# Patient Record
Sex: MALE | Race: BLACK OR AFRICAN AMERICAN | Employment: OTHER | URBAN - METROPOLITAN AREA
[De-identification: names, ages, dates, MRNs, and addresses within clinical notes are randomized per-mention and may not be internally consistent; named-entity substitution may affect disease eponyms.]

---

## 2017-01-13 ENCOUNTER — OFFICE VISIT (OUTPATIENT)
Dept: PULMONOLOGY | Age: 52
End: 2017-01-13

## 2017-01-13 VITALS
BODY MASS INDEX: 34.96 KG/M2 | HEART RATE: 65 BPM | HEIGHT: 69 IN | WEIGHT: 236 LBS | OXYGEN SATURATION: 97 % | TEMPERATURE: 98.3 F | SYSTOLIC BLOOD PRESSURE: 132 MMHG | RESPIRATION RATE: 16 BRPM | DIASTOLIC BLOOD PRESSURE: 78 MMHG

## 2017-01-13 DIAGNOSIS — Z99.89 OSA ON CPAP: Primary | ICD-10-CM

## 2017-01-13 DIAGNOSIS — E66.9 OBESITY (BMI 30.0-34.9): ICD-10-CM

## 2017-01-13 DIAGNOSIS — G47.33 OSA ON CPAP: Primary | ICD-10-CM

## 2017-01-13 RX ORDER — BISMUTH SUBSALICYLATE 262 MG
1 TABLET,CHEWABLE ORAL DAILY
COMMUNITY

## 2017-01-13 RX ORDER — ASPIRIN 81 MG/1
81 TABLET ORAL DAILY
COMMUNITY
End: 2020-08-14

## 2017-01-13 NOTE — MR AVS SNAPSHOT
Visit Information Date & Time Provider Department Dept. Phone Encounter #  
 1/13/2017  8:30 AM John Smallwood MD [facility] [id number] Your Appointments 1/30/2017  8:45 AM  
Follow Up with Heena Pavon MD  
[facility] [place] [facility] ([address] [facility]) Appt Note: 6 month follow up  
 [address] [facility] [address]  
  
   
 [address] [address] [address] [address]  
  
    
 2/15/2017  9:15 AM  
ROUTINE CARE with Willis Velasco MD  
[facility] ([address]) Appt Note: 2 month f/u; missed f/u  
 [address] [facility] [address] [facility] [address]  
[phone]  
  
   
 14 [facility] [address] [facility]  
  
    
 5/8/2017 10:30 AM  
Office Visit with Emmanuel Chester MD  
[facility] [address] [facility]) Appt Note: PSA  
 [address] [facility] [address] [address]  
[phone] [address] [address] Upcoming Health Maintenance Date Due DTaP/Tdap/Td series (1 - Tdap) 11/14/2012 INFLUENZA AGE 9 TO ADULT 8/1/2016 COLONOSCOPY 9/30/2021 Allergies as of 1/13/2017  Review Complete On: 1/13/2017 By: John Smallwood MD  
 No Known Allergies Current Immunizations  Reviewed on 6/13/2016 Name Date Td 11/13/2012 Not reviewed this visit You Were Diagnosed With   
  
 Codes Comments DB on CPAP    -  Primary ICD-10-CM: G47.33 
ICD-9-CM: 327.23 Obesity (BMI 30.0-34.9)     ICD-10-CM: M65.6 ICD-9-CM: 278.00 Vitals BP Pulse Temp Resp Height(growth percentile) Weight(growth percentile) 132/78 (BP 1 Location: Left arm, BP Patient Position: Sitting) 65 98.3 °F (36.8 °C) (Oral) 16 5' 9\" (1.753 m) 236 lb (107 kg) SpO2 BMI Smoking Status 97% 34.85 kg/m2 Never Smoker BMI and BSA Data Body Mass Index Body Surface Area  34.85 kg/m 2 2.28 m 2  
 Preferred Pharmacy Pharmacy Name Phone [id number], [facility],4Th Floor [facility] [phone] Your Updated Medication List  
  
   
This list is accurate as of: 1/13/17  9:31 AM.  Always use your most recent med list.  
  
  
  
  
 aspirin delayed-release 81 mg tablet Take  by mouth daily. cpap machine kit  
by Does Not Apply route. CPAP at 11 cm with humidifier. Mask: Wisp nasal, large. Length of need 99 months. Replace mask and accessories as needed times 12 months. Download data at 30 days and fax at [phone]. Dx- DB on CPAP, RLS, Obesity CRESTOR 10 mg tablet Generic drug:  rosuvastatin Take 1 Tab by mouth daily. Indications: HYPERLIPIDEMIA  
  
 esomeprazole 40 mg capsule Commonly known as:  Montine Likes Take 1 Cap by mouth daily. Indications: GASTROESOPHAGEAL REFLUX  
  
 isosorbide mononitrate ER 60 mg CR tablet Commonly known as:  IMDUR  
30 mg daily. LUMIGAN 0.01 % ophthalmic drops Generic drug:  bimatoprost  
Administer 1 Drop to both eyes. multivitamin tablet Commonly known as:  ONE A DAY Take 1 Tab by mouth daily. nitroglycerin 400 mcg/spray spray Commonly known as:  NITROLINGUAL  
1 [place] by SubLINGual route every five (5) minutes as needed for Chest Pain. sertraline 50 mg tablet Commonly known as:  ZOLOFT Take 1 Tab by mouth daily. Introducing [facility] & HEALTH SERVICES! Dear Miguelito Mendoza: Thank you for requesting a [employer] account. Our records indicate that you already have an active [employer] account. You can access your account anytime at [email or web address] Did you know that you can access your hospital and ER discharge instructions at any time in Impraise? You can also review all of your test results from your hospital stay or ER visit. Additional Information If you have questions, please visit the Frequently Asked Questions section of the [employer] website at [email or web address] Remember, [employer] is NOT to be used for urgent needs. For medical emergencies, dial 911. Now available from your iPhone and Android! Please provide this summary of care documentation to your next provider. Your primary care clinician is listed as Cierra Gregory. If you have any questions after today's visit, please call [phone].

## 2017-01-13 NOTE — PROGRESS NOTES
Mr. Lucia Aguilar has a reminder for a \"due or due soon\" health maintenance. I have asked that he contact his primary care provider for follow-up on this health maintenance.

## 2017-01-13 NOTE — PROGRESS NOTES
[facility]   Pulmonary, Critical Care, and Sleep Medicine     Office Progress Note    Primary Care Physician: Miri Dean MD     Reason for Visit:  Follow up for DB on CPAP    Assessment/Plan:  Mr. Foreign Darby is a 46 y.o. male who has  1. DB on CPAP    2. Obesity (BMI 30.0-34. 9)    He has DB and improvedon current new CPAP settings and hence needs CPAP  He is adherent and clinically benefiting from CPAP  His leg movement symptoms have almost resolved on CPAP new settings in past 10 days  · Discussed diagnosis, its evaluation, treatment and usual course. · All questions answered. · Discussed PSG with C PAP titration study results again with the patient  · Continue CPAP at present settings  · CPAP maintenance and humidification discussed and encouraged  · Pathophysiology, severity, risk factors, association to cardiovascular morbidities and excessive daytime sleepiness, consequences of untreated sleep apnea were discussed with the patient  · Adjunctive treatment options including weight reduction measures, positional therapy, surgeries etc were discussed  · Reducing weight with a more healthy, balanced diet and starting an exercise program encouraged   · Emphasized sleep hygiene. · Safe driving and operating machineries practices were advised  · Follow-up in 3 months, sooner should new symptoms or problems arise      Sleep Wake History: Mr. Foreign Darby is a 46 y.o. male patient who presents today for evaluation for DB on CPAP. The history was provided by the patient, spouse. DB on CPAP  This is a chronic problem- had sleep study 2001 with AHI 8.6 and had been using the CPAP for past many yrs, had another study 7 yrs ago and was titrated up on CPAP- on a 7 cm CPAP machine.  He underwent new CPAP titration study 10/16 and now has new CPAP for 1.5 week, with course - better overall, exacerbating factors - weight changes, alleviating factors - CPAP, associated factors - snoring, periods of not breathing and felt tired or unrefreshed after waking up, obesity. He has been using the new CPAP for 1.5 week. Mr. Brenna West is on a 11 cm CPAP machine 7 nights a week for >8 hours per night. He is using a humidifier. He feels that sleep quality, daytime sleepiness are better after sleeping with CPAP and snoring is absent while sleeping with CPAP. He is currently experiencing the following symptoms: none. Tullos's Score 5/24. He denies feel sleepy during the day and denies naps during daytime. He denied falling asleep while driving or motor vehicle accident because of it. Devise: CPAP  Measured Pressure: 11 cm  Mask: Wisp, large  CPAP download performed: Yes - last 10 days, use 100%, avg use 9:05 hrs, AHI 3.9, leaks - 95th percentile 0 Lpm  DME: [facility]    Leg movements  This is a chronic but significantly improved problem. Symptoms began many years ago, severity very mild, intermittent, with course -significantly better in past 10 days, exacerbating factors - DB, inactivity, alleviating factors - CPAP, move legs around, associated factors - as above. Sleep-Wake History: His usual sleeping hours workday and non-workdays are from 10:00 pm to 5:30 am and 11:00 pm to 6:00 am. It usually takes up to < 30 minutes to fall asleep after going to bed. He has less trouble staying asleep, 1 awakening in the middle of the night because of urination need. It takes < 5 minutes to fall asleep again. He had reported toss and turn a lot during sleep, vivid dreams, hypnopompic hallucinations, but denies GERD, bruxism, sleep talking/ walking. Usually ordinarily awaken spontaneously. He denied any narcolepsy symptoms -partial or complete cataplexy, or sleep paralysis or hypnagogic hallucinations. He reports no depression and anxiety.     Past Medical History:  Past Medical History   Diagnosis Date    Back pain     CAD (coronary artery disease)     Chest pain     Coronary artery spasm (HCC)  GERD (gastroesophageal reflux disease)     Headache      migraines    Hypertension     Kidney disease     Kidney stone     Nausea     Sleep apnea     Sleep apnea      uses cpap    Sleep disorder     Vomiting        Past Surgical History:  Past Surgical History   Procedure Laterality Date    Hx heent       sinus sx    Hx endoscopy      Hx urological       Lithotripsy    Hx colonoscopy      Hx back surgery       x3    Hx orthopaedic       left shoulder, right knee, left thumb, left great toe sx    Pr cardiac surg procedure unlist       cardiac catheterization    Pr colsc flx w/rmvl of tumor polyp lesion snare tq N/A 09/30/2016     Dr. Adalberto Syed Colonoscopy N/A 9/30/2016     COLONOSCOPY with biopsy performed by Glynis Homans, MD at [facility] ENDOSCOPY       Family History:  Family History   Problem Relation Age of Onset    Hypertension Mother     Diabetes Mother     Hypertension Father     Cancer Father      Prostate    Heart Disease Father      Heart Attack - 2013    Heart Attack Father     Stroke Brother     Migraines Brother     Cancer Sister      Breast    Migraines Sister     Stroke Brother        Social History:  Social History   Substance Use Topics    Smoking status: Never Smoker    Smokeless tobacco: Never Used    Alcohol use Yes      Comment: rare use        Medications:  Current Outpatient Prescriptions on File Prior to Visit   Medication Sig Dispense Refill    sertraline (ZOLOFT) 50 mg tablet Take 1 Tab by mouth daily. 30 Tab 2    cpap machine kit by Does Not Apply route. CPAP at 11 cm with humidifier. Mask: Wisp nasal, large. Length of need 99 months. Replace mask and accessories as needed times 12 months. Download data at 30 days and fax at [phone]. Dx- DB on CPAP, RLS, Obesity 1 Kit 0    CRESTOR 10 mg tablet Take 1 Tab by mouth daily. Indications: HYPERLIPIDEMIA 90 Tab 3    esomeprazole (NEXIUM) 40 mg capsule Take 1 Cap by mouth daily.  Indications: GASTROESOPHAGEAL REFLUX 90 Cap 3    isosorbide mononitrate ER (IMDUR) 60 mg CR tablet 30 mg daily.  nitroglycerin (NITROLINGUAL) 400 mcg/spray spray 1 Spray by SubLINGual route every five (5) minutes as needed for Chest Pain.  LUMIGAN 0.01 % ophthalmic drops Administer 1 Drop to both eyes. No current facility-administered medications on file prior to visit. Allergy:  No Known Allergies    Review of Systems  General ROS: positive for  - weight gain - 20 lbs, negative for - chills, fever, malaise or night sweats  Respiratory ROS: no cough, shortness of breath, or wheezing  Cardiovascular ROS: negative for - chest pain, edema, murmur, orthopnea or palpitations  Musculoskeletal ROS: positive for - joint pain and joint stiffness  Otherwise per above. Physical Exam:  Blood pressure 132/78, pulse 65, temperature 98.3 °F (36.8 °C), temperature source Oral, resp. rate 16, height 5' 9\" (1.753 m), weight 107 kg (236 lb), SpO2 97 %. on RA, Body mass index is 34.85 kg/(m^2). General: in no respiratory distress and acyanotic, appears stated age, alert, cooperative, no distress, appears stated age, moderately obese   HEENT: PERRLA, EOMI, fundi benign, throat normal without erythema or exudate, throat normal without erythema or exudate, dental indention on tongue, Mallampati's score 3-4+  Neck: No abnormally enlarged lymph nodes or thyroid, supple, neck size 19\"  Chest: normal  Lungs: moderate air entry, clear to auscultation bilaterally, normal percussion bilaterally, no tenderness/ rash  Heart: Regular rate and rhythm, S1S2 present, without murmur or extra heart sounds  Abdomen: obese, bowel sounds normoactive, tympanic, abdomen is soft without significant tenderness, masses, organomegaly or guarding  Extremity: negative for edema, cyanosis, clubbing  Skin: Skin color, texture, turgor normal. No rashes or lesions    Data Reviewed:  CPAP PSG 10/25/16  CONCLUSION:     1.  Successful CPAP titration with elimination of obstructive sleep apnea and associated hypoxemia. CPAP was applied at 5 cm and titrated up to 11 cm. At the setting of 11 cm, sleep efficiency was 97%, AHI was 1.3 with 2 obstructive apnea and 1 hypopnea, REM was seen, REM supine was seen and the lowest oxygen saturation was 91%. Mask leak was tolerable, and arousal and snoring were low. 2. Lowest oxygen saturation 81% with 0.1 minutes of total sleep time below 88% of oxygen saturation.    3. No positional or REM association to respiratory events. REM supine was seen at the CPAP titration pressure setting. 4. No significant Periodic Limb Movements of Sleep (PLMS) arousals. 5. Patient remained in sinus rhythm with PVCs and the heart rate ranging from 52 to 87 bpm.       RECOMMENDATIONS:    1. Overnight CPAP at 11 cm with humidifier.    2. Mask: Wisp nasal, large  3. Positional therapy. Weight reduction measures. 4. Please correlate findings clinically, follow-up symptoms and repeat study as needed.         (outside records)  PSG 12/05/01  AHI 8.6  RDI 22.7    CPAP PSG 11/19/04  CPAP - 8 cm    Labs  CBC:   Lab Results   Component Value Date/Time    WBC 8.4 04/28/2016 05:25 PM    HGB 13.8 04/28/2016 05:25 PM    HCT 40.9 04/28/2016 05:25 PM    PLATELET 572 62/59/5104 05:25 PM    MCV 86.8 04/28/2016 05:25 PM       BMP:   Lab Results   Component Value Date/Time    Sodium 140 06/13/2016 12:35 PM    Potassium 4.2 06/13/2016 12:35 PM    Chloride 107 06/13/2016 12:35 PM    CO2 25 06/13/2016 12:35 PM    Anion gap 8 06/13/2016 12:35 PM    Glucose 83 06/13/2016 12:35 PM    BUN 10 06/13/2016 12:35 PM    Creatinine 0.80 06/13/2016 12:35 PM    BUN/Creatinine ratio 13 06/13/2016 12:35 PM    GFR est AA >60 06/13/2016 12:35 PM    GFR est non-AA >60 06/13/2016 12:35 PM    Calcium 9.0 06/13/2016 12:35 PM        Imaging:  [x]I have personally reviewed the patients radiographs section  9/4/13   XR CHEST PA LAT  No radiographic change of acute cardiopulmonary disease. Armen Henry

## 2017-01-13 NOTE — Clinical Note
Re: Tyra Lopez  1965   Dear Susan Escobedo MD   I recently had the pleasure of seeing Tyra Lopez for follow up. Enclosed is my note from this visit. Please do not hesitate to contact me if you have any questions regarding the care plan.   Thank you very much,  Robb Marquez MD

## 2017-01-16 DIAGNOSIS — K21.9 GASTROESOPHAGEAL REFLUX DISEASE WITHOUT ESOPHAGITIS: ICD-10-CM

## 2017-01-16 RX ORDER — ESOMEPRAZOLE MAGNESIUM 40 MG/1
40 CAPSULE, DELAYED RELEASE ORAL DAILY
Qty: 90 CAP | Refills: 3 | Status: CANCELLED | OUTPATIENT
Start: 2017-01-16

## 2017-01-16 NOTE — TELEPHONE ENCOUNTER
Patient's last office visit on 09-  Medication(s) last filled on 08-15-16  Next Appointment: 02-15-17  Rx Class Normal

## 2017-01-16 NOTE — TELEPHONE ENCOUNTER
From: Rohan Escobar  To: Hector Pierce MD  Sent: 1/16/2017 10:32 AM EST  Subject: Medication Renewal Request    Original authorizing provider:  MD Rohan Pinto would like a refill of the following medications:  esomeprazole (NEXIUM) 40 mg capsule Hector Pierce MD]    Preferred pharmacy: [facility] #[id number] [address]    Comment:

## 2017-01-30 ENCOUNTER — OFFICE VISIT (OUTPATIENT)
Dept: CARDIOLOGY CLINIC | Age: 52
End: 2017-01-30

## 2017-01-30 VITALS
HEIGHT: 69 IN | WEIGHT: 237 LBS | HEART RATE: 67 BPM | SYSTOLIC BLOOD PRESSURE: 139 MMHG | BODY MASS INDEX: 35.1 KG/M2 | DIASTOLIC BLOOD PRESSURE: 85 MMHG

## 2017-01-30 DIAGNOSIS — E78.5 HYPERLIPIDEMIA, UNSPECIFIED HYPERLIPIDEMIA TYPE: ICD-10-CM

## 2017-01-30 DIAGNOSIS — I25.111 CORONARY ARTERY DISEASE INVOLVING NATIVE CORONARY ARTERY OF NATIVE HEART WITH ANGINA PECTORIS WITH DOCUMENTED SPASM (HCC): Primary | ICD-10-CM

## 2017-01-30 DIAGNOSIS — I10 ESSENTIAL HYPERTENSION WITH GOAL BLOOD PRESSURE LESS THAN 140/90: ICD-10-CM

## 2017-01-30 DIAGNOSIS — Z99.89 OSA ON CPAP: ICD-10-CM

## 2017-01-30 DIAGNOSIS — G47.33 OSA ON CPAP: ICD-10-CM

## 2017-01-30 RX ORDER — AMLODIPINE BESYLATE 2.5 MG/1
2.5 TABLET ORAL DAILY
Qty: 30 TAB | Refills: 6 | Status: SHIPPED | OUTPATIENT
Start: 2017-01-30 | End: 2017-04-10 | Stop reason: SDUPTHER

## 2017-01-30 RX ORDER — NITROGLYCERIN 400 UG/1
1 SPRAY ORAL
Qty: 1 BOTTLE | Refills: 1 | Status: SHIPPED | OUTPATIENT
Start: 2017-01-30 | End: 2017-04-10 | Stop reason: SDUPTHER

## 2017-01-30 NOTE — MR AVS SNAPSHOT
Visit Information Date & Time Provider Department Dept. Phone Encounter #  
 1/30/2017  8:45 AM Evelyn Reich MD [facility] [id number] Follow-up Instructions Return in about 6 months (around 7/30/2017). Your Appointments 2/15/2017  9:15 AM  
ROUTINE CARE with Magalys Cruz MD  
[facility] ([facility]) Appt Note: 2 month f/u; missed f/u  
 14 [facility] [address] [facility] [address]  
[phone]  
  
   
 [address]  
  
    
 5/8/2017 10:30 AM  
Office Visit with Bebe Garcia MD  
 [address]) Appt Note: [address] [address]  
[phone] [address] [address]  
  
    
 7/7/2017  9:00 AM  
Follow Up with Evelyn Reich MD  
[facility] ([facility]) Appt Note: 6 month follow up  
 [address] [facility] [address]  
  
   
 [address] [address] [address] [address] Upcoming Health Maintenance Date Due DTaP/Tdap/Td series (1 - Tdap) 11/14/2012 INFLUENZA AGE 9 TO ADULT 8/1/2016 COLONOSCOPY 9/30/2021 Allergies as of 1/30/2017  Review Complete On: 1/30/2017 By: Evelyn Reich MD  
 No Known Allergies Current Immunizations  Reviewed on 6/13/2016 Name Date Td 11/13/2012 Not reviewed this visit You Were Diagnosed With   
  
 Codes Comments Coronary artery disease involving native coronary artery of native heart with angina pectoris with documented spasm ([facility] [address]    -  Primary ICD-10-CM: I25.111 ICD-9-CM: 414.01, 413.9 occasional angina 
prn ntg 
bp stable 
add low dose norvasc Essential hypertension with goal blood pressure less than 140/90     ICD-10-CM: I10 
ICD-9-CM: 401.9 controlled DB on CPAP     ICD-10-CM: G47.33 
ICD-9-CM: 327.23 stable Hyperlipidemia, unspecified hyperlipidemia type     ICD-10-CM: E78.5 ICD-9-CM: 272.4 ldl 73 
6/2016 Vitals BP Pulse Height(growth percentile) Weight(growth percentile) BMI Smoking Status 139/85 67 5' 9\" (1.753 m) 237 lb (107.5 kg) 35 kg/m2 Never Smoker BMI and BSA Data Body Mass Index Body Surface Area 35 kg/m 2 2.29 m 2 Preferred Pharmacy Pharmacy Name Phone [facility] #[id number] Jeanette Mclaughlin, [facility]0 [facility],4Th Floor [facility] [phone] Your Updated Medication List  
  
   
This list is accurate as of: 1/30/17  9:18 AM.  Always use your most recent med list. amLODIPine 2.5 mg tablet Commonly known as:  Juli Colon Take 1 Tab by mouth daily. aspirin delayed-release 81 mg tablet Take  by mouth daily. cpap machine kit  
by Does Not Apply route. CPAP at 11 cm with humidifier. Mask: Wisp nasal, large. Length of need 99 months. Replace mask and accessories as needed times 12 months. Download data at 30 days and fax at [phone]. Dx- DB on CPAP, RLS, Obesity CRESTOR 10 mg tablet Generic drug:  rosuvastatin Take 1 Tab by mouth daily. Indications: HYPERLIPIDEMIA  
  
 esomeprazole 40 mg capsule Commonly known as:  Rexie Chon Take 1 Cap by mouth daily. Indications: GASTROESOPHAGEAL REFLUX  
  
 isosorbide mononitrate ER 60 mg CR tablet Commonly known as:  IMDUR  
30 mg daily. LUMIGAN 0.01 % ophthalmic drops Generic drug:  bimatoprost  
Administer 1 Drop to both eyes. multivitamin tablet Commonly known as:  ONE A DAY Take 1 Tab by mouth daily. nitroglycerin 400 mcg/spray spray Commonly known as:  NITROLINGUAL  
1 [employer] by SubLINGual route every five (5) minutes as needed for Chest Pain. sertraline 50 mg tablet Commonly known as:  ZOLOFT Take 1 Tab by mouth daily. Prescriptions Sent to Pharmacy  Refills  
 nitroglycerin (NITROLINGUAL) 400 mcg/spray spray 1  
 Si Spray by SubLINGual route every five (5) minutes as needed for Chest Pain. Class: Normal  
 Pharmacy: [facility]/pharmacy [address] [place] [address], [address] [facility],4[address] [facility] [address] Ph #: [phone] Route: SubLINGual  
 amLODIPine (NORVASC) 2.5 mg tablet 6 Sig: Take 1 Tab by mouth daily. Class: Normal  
 Pharmacy: [facility]/pharmacy [address] [place] [address], [facility],4Th Floor [facility] [address] Ph #: [phone] Route: Oral  
  
Follow-up Instructions Return in about 6 months (around 2017). Introducing [facility] & [facility] SERVICES! Dear Gely Gonzalez: Thank you for requesting a [employer] account. Our records indicate that you already have an active [employer] account. You can access your account anytime at [email or web address] Did you know that you can access your hospital and ER discharge instructions at any time in [employer]? You can also review all of your test results from your hospital stay or ER visit. Additional Information If you have questions, please visit the Frequently Asked Questions section of the [employer] website at [email or web address] Remember, [employer] is NOT to be used for urgent needs. For medical emergencies, dial 911. Now available from your iPhone and Android! Please provide this summary of care documentation to your next provider. Your primary care clinician is listed as Avtar Swan. If you have any questions after today's visit, please call [phone].

## 2017-01-30 NOTE — PROGRESS NOTES
1. Have you been to the ER, urgent care clinic since your last visit? Hospitalized since your last visit? No    2. Have you seen or consulted any other health care providers outside of the [facility] since your last visit? Include any pap smears or colon screening. Yes Where: pcp     3. Since your last visit, have you had any of the following symptoms? chest pains. 4.  Have you had any blood work, X-rays or cardiac testing?       No

## 2017-01-30 NOTE — PROGRESS NOTES
HISTORY OF PRESENT ILLNESS  Rita Jimenez is a 46 y.o. male. HPI Comments: Patient with cad,coronary spasm,htn  On follow up patient denies any sob, palpitation or other significant symptoms. Hypertension   The history is provided by the patient. This is a chronic problem. The problem occurs constantly. The problem has not changed since onset. Associated symptoms include chest pain. Chest Pain (Angina)    The history is provided by the patient. This is a recurrent problem. The problem has not changed since onset. The problem occurs every several days. The pain is associated with exertion and rest. The pain is present in the substernal region. The pain is mild. The quality of the pain is described as pressure-like. The pain does not radiate. Pertinent negatives include no claudication, no cough, no dizziness, no fever, no hemoptysis, no nausea, no orthopnea, no palpitations, no PND, no sputum production, no vomiting and no weakness. He has tried nitroglycerin for the symptoms. Review of Systems   Constitutional: Negative for chills and fever. HENT: Negative for nosebleeds. Eyes: Negative for blurred vision and double vision. Respiratory: Negative for cough, hemoptysis, sputum production and wheezing. Cardiovascular: Positive for chest pain. Negative for palpitations, orthopnea, claudication, leg swelling and PND. Gastrointestinal: Negative for heartburn, nausea and vomiting. Musculoskeletal: Negative for myalgias. Skin: Negative for rash. Neurological: Negative for dizziness and weakness. Endo/Heme/Allergies: Does not bruise/bleed easily.      Family History   Problem Relation Age of Onset    Hypertension Mother     Diabetes Mother     Hypertension Father     Cancer Father      Prostate    Heart Disease Father      Heart Attack - 2013    Heart Attack Father     Stroke Brother     Migraines Brother     Cancer Sister      Breast    Migraines Sister     Stroke Brother Past Medical History   Diagnosis Date    Back pain     CAD (coronary artery disease)     Chest pain     Coronary artery spasm (HCC)     GERD (gastroesophageal reflux disease)     Headache      migraines    Hypertension     Kidney disease     Kidney stone     Nausea     Sleep apnea     Sleep apnea      uses cpap    Sleep disorder     Vomiting        Past Surgical History   Procedure Laterality Date    Hx heent       sinus sx    Hx endoscopy      Hx urological       Lithotripsy    Hx colonoscopy      Hx back surgery       x3    Hx orthopaedic       left shoulder, right knee, left thumb, left great toe sx    Pr cardiac surg procedure unlist       cardiac catheterization    Pr colsc flx w/rmvl of tumor polyp lesion snare tq N/A 09/30/2016     Dr. Ayan Cabezas Colonoscopy N/A 9/30/2016     COLONOSCOPY with biopsy performed by Giovanna Arechiga MD at [facility] ENDOSCOPY       Social History   Substance Use Topics    Smoking status: Never Smoker    Smokeless tobacco: Never Used    Alcohol use Yes      Comment: rare use       No Known Allergies    Outpatient Prescriptions Marked as Taking for the 1/30/17 encounter (Office Visit) with Svetlana Contreras MD   Medication Sig Dispense Refill    nitroglycerin (NITROLINGUAL) 400 mcg/spray spray 1 Spray by SubLINGual route every five (5) minutes as needed for Chest Pain. 1 Bottle 1    amLODIPine (NORVASC) 2.5 mg tablet Take 1 Tab by mouth daily. 30 Tab 6    aspirin delayed-release 81 mg tablet Take  by mouth daily.  multivitamin (ONE A DAY) tablet Take 1 Tab by mouth daily.  sertraline (ZOLOFT) 50 mg tablet Take 1 Tab by mouth daily. 30 Tab 2    cpap machine kit by Does Not Apply route. CPAP at 11 cm with humidifier. Mask: Wisp nasal, large. Length of need 99 months. Replace mask and accessories as needed times 12 months. Download data at 30 days and fax at [phone].  Dx- DB on CPAP, RLS, Obesity 1 Kit 0    CRESTOR 10 mg tablet Take 1 Tab by mouth daily. Indications: HYPERLIPIDEMIA 90 Tab 3    esomeprazole (NEXIUM) 40 mg capsule Take 1 Cap by mouth daily. Indications: GASTROESOPHAGEAL REFLUX 90 Cap 3    isosorbide mononitrate ER (IMDUR) 60 mg CR tablet 30 mg daily.  LUMIGAN 0.01 % ophthalmic drops Administer 1 Drop to both eyes. Visit Vitals    /85    Pulse 67    Ht 5' 9\" (1.753 m)    Wt 107.5 kg (237 lb)    BMI 35 kg/m2         Physical Exam   Constitutional: He is oriented to person, place, and time. He appears well-developed and well-nourished. HENT:   Head: Normocephalic and atraumatic. Eyes: Conjunctivae are normal.   Neck: Neck supple. No JVD present. No tracheal deviation present. No thyromegaly present. Cardiovascular: Normal rate, regular rhythm and normal heart sounds. Exam reveals no gallop and no friction rub. No murmur heard. Pulmonary/Chest: Breath sounds normal. No respiratory distress. He has no wheezes. He has no rales. He exhibits no tenderness. Abdominal: Soft. There is no tenderness. Musculoskeletal: He exhibits no edema. Neurological: He is alert and oriented to person, place, and time. Skin: Skin is warm and dry. Psychiatric: He has a normal mood and affect. Mr. Camila Pinedo has a reminder for a \"due or due soon\" health maintenance. I have asked that he contact his primary care provider for follow-up on this health maintenance. Cath-2004  rca spasm-  lcx 30-40%  Stress test-2015  Fixed ant defect  Echo-2015  Normal lvef  ekg-7/2016-  wnl    Assessment         ICD-10-CM ICD-9-CM    1. Coronary artery disease involving native coronary artery of native heart with angina pectoris with documented spasm ([facility]) I25.111 414.01      413.9     occasional angina  prn ntg  bp stable  add low dose norvasc   2. Essential hypertension with goal blood pressure less than 140/90 I10 401.9     controlled   3. DB on CPAP G47.33 327.23     stable   4.  Hyperlipidemia, unspecified hyperlipidemia type E78.5 272.4     ldl 73  2016   Had tried cardizem in past -had drop in bp  No acei/normal lvef    Medications Discontinued During This Encounter   Medication Reason    nitroglycerin (NITROLINGUAL) 400 mcg/spray spray Reorder       Orders Placed This Encounter    nitroglycerin (NITROLINGUAL) 400 mcg/spray spray     Si Spray by SubLINGual route every five (5) minutes as needed for Chest Pain. Dispense:  1 Bottle     Refill:  1    amLODIPine (NORVASC) 2.5 mg tablet     Sig: Take 1 Tab by mouth daily. Dispense:  30 Tab     Refill:  6       Follow-up Disposition:  Return in about 6 months (around 2017).

## 2017-01-30 NOTE — LETTER
Antwon Cain 1965 1/30/2017 Dear MD Jaswant Galindo MD 
 
I had the pleasure of evaluating  Mr. Gissel Lezama in office today. Below are the relevant portions of my assessment and plan of care. ICD-10-CM ICD-9-CM 1. Coronary artery disease involving native coronary artery of native heart with angina pectoris with documented spasm ([facility]) I25.111 414.01   
  413.9   
 occasional angina 
prn ntg 
bp stable 
add low dose norvasc 2. Essential hypertension with goal blood pressure less than 140/90 I10 401.9   
 controlled 3. DB on CPAP G47.33 327.23   
 stable 4. Hyperlipidemia, unspecified hyperlipidemia type E78.5 272.4   
 ldl 73 
6/2016 Current Outpatient Prescriptions Medication Sig Dispense Refill  nitroglycerin (NITROLINGUAL) 400 mcg/spray spray 1 Spray by SubLINGual route every five (5) minutes as needed for Chest Pain. 1 Bottle 1  
 amLODIPine (NORVASC) 2.5 mg tablet Take 1 Tab by mouth daily. 30 Tab 6  
 aspirin delayed-release 81 mg tablet Take  by mouth daily.  multivitamin (ONE A DAY) tablet Take 1 Tab by mouth daily.  sertraline (ZOLOFT) 50 mg tablet Take 1 Tab by mouth daily. 30 Tab 2  
 cpap machine kit by Does Not Apply route. CPAP at 11 cm with humidifier. Mask: Wisp nasal, large. Length of need 99 months. Replace mask and accessories as needed times 12 months. Download data at 30 days and fax at [phone]. Dx- DB on CPAP, RLS, Obesity 1 Kit 0  
 CRESTOR 10 mg tablet Take 1 Tab by mouth daily. Indications: HYPERLIPIDEMIA 90 Tab 3  
 esomeprazole (NEXIUM) 40 mg capsule Take 1 Cap by mouth daily. Indications: GASTROESOPHAGEAL REFLUX 90 Cap 3  
 isosorbide mononitrate ER (IMDUR) 60 mg CR tablet 30 mg daily.  LUMIGAN 0.01 % ophthalmic drops Administer 1 Drop to both eyes. Orders Placed This Encounter  nitroglycerin (NITROLINGUAL) 400 mcg/spray spray Si Spray by SubLINGual route every five (5) minutes as needed for Chest Pain. Dispense:  1 Bottle Refill:  1  
 amLODIPine (NORVASC) 2.5 mg tablet Sig: Take 1 Tab by mouth daily. Dispense:  30 Tab Refill:  6 If you have questions, please do not hesitate to call me. I look forward to following Mr. Ingris Sutton along with you. Sincerely, Norris Terry MD

## 2017-03-07 ENCOUNTER — HOSPITAL ENCOUNTER (OUTPATIENT)
Dept: LAB | Age: 52
Discharge: HOME | End: 2017-03-07

## 2017-03-07 ENCOUNTER — OFFICE VISIT (OUTPATIENT)
Dept: FAMILY MEDICINE CLINIC | Facility: CLINIC | Age: 52
End: 2017-03-07

## 2017-03-07 VITALS
SYSTOLIC BLOOD PRESSURE: 128 MMHG | RESPIRATION RATE: 12 BRPM | OXYGEN SATURATION: 96 % | TEMPERATURE: 98 F | DIASTOLIC BLOOD PRESSURE: 82 MMHG | HEART RATE: 73 BPM | WEIGHT: 238 LBS | BODY MASS INDEX: 35.25 KG/M2 | HEIGHT: 69 IN

## 2017-03-07 DIAGNOSIS — F32.9 MAJOR DEPRESSIVE DISORDER WITH SINGLE EPISODE, REMISSION STATUS UNSPECIFIED: ICD-10-CM

## 2017-03-07 DIAGNOSIS — R73.03 PREDIABETES: ICD-10-CM

## 2017-03-07 DIAGNOSIS — R35.89 POLYURIA: ICD-10-CM

## 2017-03-07 DIAGNOSIS — I25.111 CORONARY ARTERY DISEASE INVOLVING NATIVE CORONARY ARTERY OF NATIVE HEART WITH ANGINA PECTORIS WITH DOCUMENTED SPASM (HCC): ICD-10-CM

## 2017-03-07 DIAGNOSIS — R51.9 ACUTE NONINTRACTABLE HEADACHE, UNSPECIFIED HEADACHE TYPE: ICD-10-CM

## 2017-03-07 DIAGNOSIS — K21.9 GASTROESOPHAGEAL REFLUX DISEASE WITHOUT ESOPHAGITIS: ICD-10-CM

## 2017-03-07 DIAGNOSIS — I10 ESSENTIAL HYPERTENSION WITH GOAL BLOOD PRESSURE LESS THAN 140/90: Primary | ICD-10-CM

## 2017-03-07 PROCEDURE — 99001 SPECIMEN HANDLING PT-LAB: CPT | Performed by: FAMILY MEDICINE

## 2017-03-07 NOTE — PATIENT INSTRUCTIONS

## 2017-03-07 NOTE — PROGRESS NOTES
HISTORY OF PRESENT ILLNESS  Tyra Lopez is a 46 y.o. male. HPI Comments: Seen in follow-up for HTN, coronary artery spasm, autonomic neuropathy, GERD, sleep apnea (on CPAP), depression/anxiety, kidney stones (followed by Urology here), glaucoma. He is concerned about frequent urination, not associated with dysuria/polydipsia/polyphagia/weight loss, and wonders if he now has diabetes. He has nocturia as well. He also complains of dull, generalized intermittent headache over the past few days - not associated with N/V, NC, lacrimation. These last up to 3 hours, are 3/10 in intensity, and resolve without medication. He has had what sound like migraines in the past, with negative work-ups, but these are not similar to those headaches or to his previous sinus headaches. Physical   Associated symptoms include headaches. Pertinent negatives include no chest pain and no shortness of breath. Headache   Associated symptoms include headaches. Pertinent negatives include no chest pain and no shortness of breath.        Past Medical History:   Diagnosis Date    Back pain     CAD (coronary artery disease)     Chest pain     Coronary artery spasm (HCC)     GERD (gastroesophageal reflux disease)     Headache     migraines    Hypertension     Kidney disease     Kidney stone     Nausea     Sleep apnea     Sleep apnea     uses cpap    Sleep disorder     Vomiting        Past Surgical History:   Procedure Laterality Date    CARDIAC SURG PROCEDURE UNLIST      cardiac catheterization    COLONOSCOPY N/A 9/30/2016    COLONOSCOPY with biopsy performed by Yamilka Laguna MD at [facility] ENDOSCOPY    HX BACK SURGERY      x3    HX COLONOSCOPY      HX ENDOSCOPY      HX HEENT      sinus sx    HX ORTHOPAEDIC      left shoulder, right knee, left thumb, left great toe sx    HX UROLOGICAL      Lithotripsy    WI COLSC FLX W/RMVL OF TUMOR POLYP LESION SNARE TQ N/A 09/30/2016    Dr. Jyotsna Quezada       History   Smoking Status  Never Smoker   Smokeless Tobacco    Never Used     Current Outpatient Prescriptions   Medication Sig    nitroglycerin (NITROLINGUAL) 400 mcg/spray spray 1 Spray by SubLINGual route every five (5) minutes as needed for Chest Pain.  amLODIPine (NORVASC) 2.5 mg tablet Take 1 Tab by mouth daily.  aspirin delayed-release 81 mg tablet Take  by mouth daily.  multivitamin (ONE A DAY) tablet Take 1 Tab by mouth daily.  sertraline (ZOLOFT) 50 mg tablet Take 1 Tab by mouth daily.  cpap machine kit by Does Not Apply route. CPAP at 11 cm with humidifier. Mask: Wisp nasal, large. Length of need 99 months. Replace mask and accessories as needed times 12 months. Download data at 30 days and fax at [phone]. Dx- DB on CPAP, RLS, Obesity    CRESTOR 10 mg tablet Take 1 Tab by mouth daily. Indications: HYPERLIPIDEMIA    esomeprazole (NEXIUM) 40 mg capsule Take 1 Cap by mouth daily. Indications: GASTROESOPHAGEAL REFLUX    isosorbide mononitrate ER (IMDUR) 60 mg CR tablet 30 mg daily.  LUMIGAN 0.01 % ophthalmic drops Administer 1 Drop to both eyes. No current facility-administered medications for this visit. Review of Systems   Constitutional: Negative for chills and fever. HENT: Negative for congestion, ear pain, sore throat and tinnitus. Eyes: Negative for blurred vision and double vision. Respiratory: Negative for shortness of breath. Cardiovascular: Negative for chest pain, palpitations and leg swelling. Gastrointestinal: Negative for nausea and vomiting. Genitourinary: Positive for frequency and urgency. Negative for dysuria. Neurological: Positive for dizziness (single episode last week) and headaches. Negative for tingling and focal weakness.        Visit Vitals    /82 (BP 1 Location: Right arm, BP Patient Position: Sitting)    Pulse 73    Temp 98 °F (36.7 °C) (Oral)    Resp 12    Ht 5' 9\" (1.753 m)    Wt 238 lb (108 kg)    SpO2 96%    BMI 35.15 kg/m2 Physical Exam   Constitutional: He is oriented to person, place, and time. He appears well-developed and well-nourished. No distress. HENT:   Right Ear: Tympanic membrane, external ear and ear canal normal.   Left Ear: Tympanic membrane, external ear and ear canal normal.   Nose: Nose normal.   Mouth/Throat: Oropharynx is clear and moist.   Eyes: Conjunctivae and EOM are normal. Pupils are equal, round, and reactive to light. Neck: Neck supple. Carotid bruit is not present. No thyromegaly present. Cardiovascular: Normal rate and regular rhythm. Exam reveals no gallop and no friction rub. No murmur heard. Pulmonary/Chest: Effort normal and breath sounds normal. No respiratory distress. Musculoskeletal: He exhibits no edema. Lymphadenopathy:     He has no cervical adenopathy. Neurological: He is alert and oriented to person, place, and time. Skin: Skin is warm and dry. Psychiatric: He has a normal mood and affect. His behavior is normal. Judgment and thought content normal.       ASSESSMENT and PLAN    ICD-10-CM ICD-9-CM    1. Essential hypertension with goal blood pressure less than 140/90 I10 401.9    2. Prediabetes R73.03 790.29    3. Polyuria P06.0 468.21 METABOLIC PANEL, COMPREHENSIVE      HEMOGLOBIN A1C WITH EAG      URINALYSIS W/ RFLX MICROSCOPIC   4. Coronary artery disease involving native coronary artery of native heart with angina pectoris with documented spasm ([facility] [address] I25.111 414.01      413.9    5. Gastroesophageal reflux disease without esophagitis K21.9 530.81    6. Major depressive disorder with single episode, remission status unspecified F32.9 296.20    7. Acute nonintractable headache, unspecified headache type R51 784.0      Follow-up Disposition:  Return in about 6 months (around 9/7/2017).   current treatment plan is effective, no change in therapy  lab results and schedule of future lab studies reviewed with patient - frequency  could reflect UTI, or diabetes  reviewed diet, exercise and weight control  reviewed medications and side effects in detail  His headaches do not seem to be related to medications, or sinus infection, or migraine, or BP - can take Tylenol prn for them  Plan of care reviewed - patient verbalize(s) understanding and agreement.

## 2017-03-08 LAB
ALBUMIN SERPL-MCNC: 4.4 G/DL (ref 3.5–5.5)
ALBUMIN/GLOB SERPL: 1.8 {RATIO} (ref 1.1–2.5)
ALP SERPL-CCNC: 75 IU/L (ref 39–117)
ALT SERPL-CCNC: 31 IU/L (ref 0–44)
AST SERPL-CCNC: 28 IU/L (ref 0–40)
BILIRUB SERPL-MCNC: <0.2 MG/DL (ref 0–1.2)
BUN SERPL-MCNC: 11 MG/DL (ref 6–24)
BUN/CREAT SERPL: 13 (ref 9–20)
CALCIUM SERPL-MCNC: 9.3 MG/DL (ref 8.7–10.2)
CHLORIDE SERPL-SCNC: 105 MMOL/L (ref 96–106)
CO2 SERPL-SCNC: 20 MMOL/L (ref 18–29)
CREAT SERPL-MCNC: 0.82 MG/DL (ref 0.76–1.27)
EST. AVERAGE GLUCOSE BLD GHB EST-MCNC: 126 MG/DL
GLOBULIN SER CALC-MCNC: 2.5 G/DL (ref 1.5–4.5)
GLUCOSE SERPL-MCNC: 89 MG/DL (ref 65–99)
HBA1C MFR BLD: 6 % (ref 4.8–5.6)
POTASSIUM SERPL-SCNC: 4.4 MMOL/L (ref 3.5–5.2)
PROT SERPL-MCNC: 6.9 G/DL (ref 6–8.5)
SODIUM SERPL-SCNC: 143 MMOL/L (ref 134–144)

## 2017-03-13 NOTE — PROGRESS NOTES
Patient made aware of normal lab results. Patient stated he gave urine sample in the office the day of his appointment  17 . Verified name and . Patient verbalized an understanding of results and did not voice any concerns at this time.

## 2017-04-10 ENCOUNTER — HOSPITAL ENCOUNTER (EMERGENCY)
Age: 52
Discharge: HOME | End: 2017-04-10
Attending: EMERGENCY MEDICINE | Admitting: EMERGENCY MEDICINE
Payer: COMMERCIAL

## 2017-04-10 VITALS
TEMPERATURE: 100.5 F | SYSTOLIC BLOOD PRESSURE: 133 MMHG | HEIGHT: 69 IN | WEIGHT: 238 LBS | DIASTOLIC BLOOD PRESSURE: 79 MMHG | BODY MASS INDEX: 35.25 KG/M2 | OXYGEN SATURATION: 95 % | HEART RATE: 118 BPM | RESPIRATION RATE: 18 BRPM

## 2017-04-10 DIAGNOSIS — K52.9 GASTROENTERITIS, ACUTE: Primary | ICD-10-CM

## 2017-04-10 DIAGNOSIS — E78.5 HYPERLIPIDEMIA, UNSPECIFIED HYPERLIPIDEMIA TYPE: ICD-10-CM

## 2017-04-10 DIAGNOSIS — F32.4 MAJOR DEPRESSIVE DISORDER WITH SINGLE EPISODE, IN PARTIAL REMISSION (HCC): ICD-10-CM

## 2017-04-10 DIAGNOSIS — K21.9 GASTROESOPHAGEAL REFLUX DISEASE WITHOUT ESOPHAGITIS: ICD-10-CM

## 2017-04-10 LAB
ANION GAP BLD CALC-SCNC: 11 MMOL/L (ref 3–18)
BASOPHILS # BLD AUTO: 0 K/UL (ref 0–0.06)
BASOPHILS # BLD: 0 % (ref 0–2)
BUN SERPL-MCNC: 15 MG/DL (ref 7–18)
BUN/CREAT SERPL: 14 (ref 12–20)
CALCIUM SERPL-MCNC: 9.2 MG/DL (ref 8.5–10.1)
CHLORIDE SERPL-SCNC: 104 MMOL/L (ref 100–108)
CO2 SERPL-SCNC: 28 MMOL/L (ref 21–32)
CREAT SERPL-MCNC: 1.08 MG/DL (ref 0.6–1.3)
DIFFERENTIAL METHOD BLD: ABNORMAL
EOSINOPHIL # BLD: 0 K/UL (ref 0–0.4)
EOSINOPHIL NFR BLD: 0 % (ref 0–5)
ERYTHROCYTE [DISTWIDTH] IN BLOOD BY AUTOMATED COUNT: 14.3 % (ref 11.6–14.5)
GLUCOSE SERPL-MCNC: 120 MG/DL (ref 74–99)
HCT VFR BLD AUTO: 45.8 % (ref 36–48)
HGB BLD-MCNC: 15.5 G/DL (ref 13–16)
LYMPHOCYTES # BLD AUTO: 6 % (ref 21–52)
LYMPHOCYTES # BLD: 0.5 K/UL (ref 0.9–3.6)
MCH RBC QN AUTO: 29.2 PG (ref 24–34)
MCHC RBC AUTO-ENTMCNC: 33.8 G/DL (ref 31–37)
MCV RBC AUTO: 86.4 FL (ref 74–97)
MONOCYTES # BLD: 0.5 K/UL (ref 0.05–1.2)
MONOCYTES NFR BLD AUTO: 6 % (ref 3–10)
NEUTS SEG # BLD: 7.6 K/UL (ref 1.8–8)
NEUTS SEG NFR BLD AUTO: 88 % (ref 40–73)
PLATELET # BLD AUTO: 252 K/UL (ref 135–420)
PMV BLD AUTO: 10.4 FL (ref 9.2–11.8)
POTASSIUM SERPL-SCNC: 4.1 MMOL/L (ref 3.5–5.5)
RBC # BLD AUTO: 5.3 M/UL (ref 4.7–5.5)
SODIUM SERPL-SCNC: 143 MMOL/L (ref 136–145)
WBC # BLD AUTO: 8.6 K/UL (ref 4.6–13.2)

## 2017-04-10 PROCEDURE — 99282 EMERGENCY DEPT VISIT SF MDM: CPT

## 2017-04-10 PROCEDURE — 74011250636 HC RX REV CODE- 250/636: Performed by: EMERGENCY MEDICINE

## 2017-04-10 PROCEDURE — 96361 HYDRATE IV INFUSION ADD-ON: CPT

## 2017-04-10 PROCEDURE — 96374 THER/PROPH/DIAG INJ IV PUSH: CPT

## 2017-04-10 PROCEDURE — 85025 COMPLETE CBC W/AUTO DIFF WBC: CPT | Performed by: EMERGENCY MEDICINE

## 2017-04-10 PROCEDURE — 80048 BASIC METABOLIC PNL TOTAL CA: CPT | Performed by: EMERGENCY MEDICINE

## 2017-04-10 RX ORDER — AMLODIPINE BESYLATE 2.5 MG/1
2.5 TABLET ORAL DAILY
Qty: 90 TAB | Refills: 3 | Status: SHIPPED | COMMUNITY
Start: 2017-04-10 | End: 2018-03-24 | Stop reason: SDUPTHER

## 2017-04-10 RX ORDER — ONDANSETRON 8 MG/1
8 TABLET, ORALLY DISINTEGRATING ORAL
Qty: 10 TAB | Refills: 0 | Status: SHIPPED | OUTPATIENT
Start: 2017-04-10 | End: 2017-04-17

## 2017-04-10 RX ORDER — ROSUVASTATIN CALCIUM 10 MG/1
10 TABLET, FILM COATED ORAL DAILY
Qty: 90 TAB | Refills: 4 | Status: SHIPPED | OUTPATIENT
Start: 2017-04-10 | End: 2017-06-30 | Stop reason: SDUPTHER

## 2017-04-10 RX ORDER — ESOMEPRAZOLE MAGNESIUM 40 MG/1
40 CAPSULE, DELAYED RELEASE ORAL DAILY
Qty: 90 CAP | Refills: 4 | Status: SHIPPED | OUTPATIENT
Start: 2017-04-10 | End: 2018-04-27 | Stop reason: SDUPTHER

## 2017-04-10 RX ORDER — SERTRALINE HYDROCHLORIDE 50 MG/1
50 TABLET, FILM COATED ORAL DAILY
Qty: 90 TAB | Refills: 4 | Status: SHIPPED | OUTPATIENT
Start: 2017-04-10 | End: 2018-04-27 | Stop reason: SDUPTHER

## 2017-04-10 RX ORDER — TRAMADOL HYDROCHLORIDE 50 MG/1
50 TABLET ORAL
Qty: 20 TAB | Refills: 0 | Status: SHIPPED | OUTPATIENT
Start: 2017-04-10 | End: 2017-04-10

## 2017-04-10 RX ORDER — ONDANSETRON 2 MG/ML
8 INJECTION INTRAMUSCULAR; INTRAVENOUS
Status: COMPLETED | OUTPATIENT
Start: 2017-04-10 | End: 2017-04-10

## 2017-04-10 RX ORDER — NITROGLYCERIN 400 UG/1
1 SPRAY ORAL
Qty: 1 BOTTLE | Refills: 1 | Status: SHIPPED | COMMUNITY
Start: 2017-04-10 | End: 2018-07-24 | Stop reason: SDUPTHER

## 2017-04-10 RX ADMIN — ONDANSETRON 8 MG: 2 INJECTION INTRAMUSCULAR; INTRAVENOUS at 08:52

## 2017-04-10 RX ADMIN — SODIUM CHLORIDE 1000 ML: 900 INJECTION, SOLUTION INTRAVENOUS at 08:52

## 2017-04-10 NOTE — ED TRIAGE NOTES
Patient c/o eating at [facility] yesterday and think he has food poisonings, vomited x 4 or 5 times this morning and diarrhea x 6 this morning

## 2017-04-10 NOTE — ED NOTES
I have reviewed discharge instructions with the patient. The patient verbalized understanding. Current Discharge Medication List      START taking these medications    Details   ondansetron (ZOFRAN ODT) 8 mg disintegrating tablet Take 1 Tab by mouth every eight (8) hours as needed for Nausea.   Qty: 10 Tab, Refills: 0         Patient armband removed and shredded

## 2017-04-10 NOTE — PROGRESS NOTES
Received request for mail order pharmacy for Sertraline, Esomeprazole, and Crestor. Will do this for him.

## 2017-04-10 NOTE — DISCHARGE INSTRUCTIONS
Food Poisoning: Care Instructions  Your Care Instructions  Food poisoning occurs when you eat foods that contain harmful germs. Food can be contaminated while it is growing, during processing, or when it is prepared. Fresh fruits and vegetables also can be contaminated if they are washed in contaminated water. You may have become ill after eating undercooked meat or eggs or other unsafe foods. Cooking foods thoroughly and storing them properly can help prevent food poisoning. There are many types of food poisoning. Your symptoms depend on the type of food poisoning you have. You will probably begin to feel better in 1 or 2 days. In the meantime, get plenty of rest and make sure that you do not become dehydrated. Follow-up care is a key part of your treatment and safety. Be sure to make and go to all appointments, and call your doctor if you are having problems. Its also a good idea to know your test results and keep a list of the medicines you take. How can you care for yourself at home? · To prevent dehydration, drink plenty of fluids, enough so that your urine is light yellow or clear like water. Choose water and other caffeine-free clear liquids until you feel better. If you have kidney, heart, or liver disease and have to limit fluids, talk with your doctor before you increase the amount of fluids you drink. · Begin eating small amounts of mild, low-fat foods, depending on how you feel. Try foods like rice, dry crackers, bananas, and applesauce. ¨ Avoid spicy foods, alcohol, and coffee until 48 hours after all symptoms have disappeared. ¨ Avoid chewing gum that contains sorbitol. ¨ Avoid dairy products for 3 days after symptoms disappear. · Take your medicines as prescribed. Call your doctor if you think you are having a problem with your medicine. You will get more details on the specific medicines your doctor prescribes. To prevent food poisoning  · Keep hot foods hot and cold foods cold.   · Do not eat meats, dressings, salads, or other foods that have been kept at room temperature for more than 2 hours. · Use a thermometer to check your refrigerator. It should be between 34°F and 40°F.  · Defrost meats in the refrigerator or microwave, not on the kitchen counter. · Keep your hands and your kitchen clean. Wash your hands, cutting boards, and countertops with hot, soapy water. If you use the same cutting board for chopping vegetables and preparing raw meat, be sure to wash the cutting board with hot, soapy water between each use. · Cook meat until it is well done. · Do not eat raw eggs or uncooked dough or sauces made with raw eggs. · Do not take chances. If you think food looks or tastes spoiled, throw it out. When should you call for help? Call 911 anytime you think you may need emergency care. For example, call if:  · You have sudden, severe belly pain. · You passed out (lost consciousness). · You vomit blood or what looks like coffee grounds. · You pass maroon or very bloody stools. Call your doctor now or seek immediate medical care if:  · You have signs of needing more fluids. You have sunken eyes and a dry mouth, and you pass only a little dark urine. · Weakness in your legs makes it hard to stand or walk. · You have swelling in your hands, face, or feet. · You have trouble breathing. · You are dizzy or lightheaded, or you feel like you may faint. · Your stools are black and tarlike or have streaks of blood. · You have numbness and tingling in your hands or feet. · You have new nausea or vomiting. · You have new or increased belly pain. · Your joints ache. Watch closely for changes in your health, and be sure to contact your doctor if:  · Your vomiting is not getting better after 1 to 2 days. · Your diarrhea is not getting better after 3 days. Where can you learn more? Go to http://sandra-ramana.info/.   Enter T116 in the search box to learn more about \"Food Poisoning: Care Instructions. \"  Current as of: May 24, 2016  Content Version: 11.2  © 9888-8434 [employer]. Care instructions adapted under license by [employer] (which disclaims liability or warranty for this information). If you have questions about a medical condition or this instruction, always ask your healthcare professional. [place] [address] any warranty or liability for your use of this information.

## 2017-04-10 NOTE — ED PROVIDER NOTES
HPI Comments: 8:00 AM Homero Arce is a 46 y.o. male with a history of CAD, HTN, Kidney Disease and GERD who presents to the emergency department c/o N/V/D onset 10:00 PM last night with associated diffuse abdominal pain. The patient explains that he ate at [facility] yesterday afternoon and feels that this may be the source of his symptoms secondary to feeling well and normal earlier in the day. Pt also c/o headache. No other complaints or concerns at this time. PCP: Veena Arzola MD      The history is provided by the patient.         Past Medical History:   Diagnosis Date    Back pain     CAD (coronary artery disease)     Chest pain     Coronary artery spasm (HCC)     GERD (gastroesophageal reflux disease)     Headache     migraines    Hypertension     Kidney disease     Kidney stone     Nausea     Sleep apnea     Sleep apnea     uses cpap    Sleep disorder     Vomiting        Past Surgical History:   Procedure Laterality Date    CARDIAC SURG PROCEDURE UNLIST      cardiac catheterization    COLONOSCOPY N/A 9/30/2016    COLONOSCOPY with biopsy performed by Michael Mayo MD at [facility] ENDOSCOPY    HX BACK SURGERY      x3    HX COLONOSCOPY      HX ENDOSCOPY      HX HEENT      sinus sx    HX ORTHOPAEDIC      left shoulder, right knee, left thumb, left great toe sx    HX UROLOGICAL      Lithotripsy    RI COLSC FLX W/RMVL OF TUMOR POLYP LESION SNARE TQ N/A 09/30/2016    Dr. Flaivo Suh         Family History:   Problem Relation Age of Onset    Hypertension Mother     Diabetes Mother     Hypertension Father     Cancer Father      Prostate    Heart Disease Father      Heart Attack - 2013    Heart Attack Father     Stroke Brother     Migraines Brother     Cancer Sister      Breast    Migraines Sister     Stroke Brother        Social History     Social History    Marital status:      Spouse name: N/A    Number of children: N/A    Years of education: N/A     Occupational History    Not on file. Social History Main Topics    Smoking status: Never Smoker    Smokeless tobacco: Never Used    Alcohol use Yes      Comment: rare use    Drug use: No    Sexual activity: Yes     Partners: Female     Other Topics Concern    Not on file     Social History Narrative    ** Merged History Encounter **              ALLERGIES: Review of patient's allergies indicates no known allergies. Review of Systems   Constitutional: Negative for chills. HENT: Negative for congestion and rhinorrhea. Respiratory: Negative for cough and shortness of breath. Cardiovascular: Negative for chest pain and leg swelling. Gastrointestinal: Positive for abdominal pain (diffuse), diarrhea, nausea and vomiting. Genitourinary: Negative for dysuria and hematuria. Musculoskeletal: Negative for arthralgias and myalgias. Skin: Negative for rash and wound. Neurological: Positive for headaches. Negative for light-headedness. Psychiatric/Behavioral: Negative for confusion and hallucinations. All other systems reviewed and are negative. Vitals:    04/10/17 0756   BP: 133/79   Pulse: (!) 118   Resp: 18   Temp: (!) 100.5 °F (38.1 °C)   SpO2: 95%   Weight: 108 kg (238 lb)   Height: 5' 9\" (1.753 m)            Physical Exam   Constitutional: He is oriented to person, place, and time. He appears well-developed and well-nourished. HENT:   Head: Normocephalic and atraumatic. Eyes: Pupils are equal, round, and reactive to light. Neck: Neck supple. Cardiovascular: Normal rate. No murmur heard. Pulmonary/Chest: Effort normal. He has no wheezes. Abdominal: Soft. There is no tenderness. Musculoskeletal: He exhibits no tenderness. Neurological: He is alert and oriented to person, place, and time. Skin: No pallor. Nursing note and vitals reviewed.        MDM  Number of Diagnoses or Management Options  Gastroenteritis, acute:   Diagnosis management comments: Feels better after meds and IVF, wants to go home, agrees with dispo and F/U plan. Smiley Bean MD  9:37 AM         Amount and/or Complexity of Data Reviewed  Clinical lab tests: ordered and reviewed      ED Course       Procedures  Vitals:  Patient Vitals for the past 12 hrs:   Temp Pulse Resp BP SpO2   04/10/17 0756 (!) 100.5 °F (38.1 °C) (!) 118 18 133/79 95 %   95 %. Percentage is within normal limits. Medications ordered:   Medications - No data to display      Lab findings:  No results found for this or any previous visit (from the past 12 hour(s)). EKG interpretation by ED Physician:        X-Ray, CT or other radiology findings or impressions:  No orders to display         Progress notes, Consult notes or additional Procedure notes:        Disposition:  Diagnosis: No diagnosis found. Disposition:       Scribe Attestation:     Toby Azevedo for and in the presence of Smiley Bean MD April 10, 2017 at 8:11 AM     Physician Attestation:   I personally performed the services described in this documentation, reviewed and edited the documentation which was dictated to the scribe in my presence, and it accurately records my words and actions.  Smiley Bean MD  April 10, 2017 at 8:11 AM    Signed by: Felipe Spencer April 10, 2017, 8:11 AM

## 2017-04-17 ENCOUNTER — HOSPITAL ENCOUNTER (OUTPATIENT)
Dept: LAB | Age: 52
Discharge: HOME | End: 2017-04-17

## 2017-04-17 ENCOUNTER — OFFICE VISIT (OUTPATIENT)
Dept: FAMILY MEDICINE CLINIC | Facility: CLINIC | Age: 52
End: 2017-04-17

## 2017-04-17 VITALS
HEART RATE: 68 BPM | BODY MASS INDEX: 34.96 KG/M2 | OXYGEN SATURATION: 97 % | SYSTOLIC BLOOD PRESSURE: 126 MMHG | WEIGHT: 236 LBS | TEMPERATURE: 98.6 F | RESPIRATION RATE: 16 BRPM | DIASTOLIC BLOOD PRESSURE: 78 MMHG | HEIGHT: 69 IN

## 2017-04-17 DIAGNOSIS — K52.9 GASTROENTERITIS: Primary | ICD-10-CM

## 2017-04-17 DIAGNOSIS — R42 DIZZINESS: ICD-10-CM

## 2017-04-17 PROCEDURE — 99001 SPECIMEN HANDLING PT-LAB: CPT | Performed by: FAMILY MEDICINE

## 2017-04-17 NOTE — PROGRESS NOTES
HISTORY OF PRESENT ILLNESS  Araceli Hung is a 46 y.o. male. HPI Comments: Seen in follow-up after his ER visit a week ago for food poisoning. He is no longer having diarrhea, and hasn't needed Zofran for 6 days, though he is nauseated today (no vomiting). He is still having some lightheadedness (not orthostatic). His fluid intake is good, though his appetite is decreased. No ill contacts. Follow-up   Associated symptoms include headaches. Pertinent negatives include no chest pain and no shortness of breath. Past Medical History:   Diagnosis Date    Back pain     CAD (coronary artery disease)     Chest pain     Coronary artery spasm (HCC)     GERD (gastroesophageal reflux disease)     Headache     migraines    Hypertension     Kidney disease     Kidney stone     Nausea     Sleep apnea     Sleep apnea     uses cpap    Sleep disorder     Vomiting        Past Surgical History:   Procedure Laterality Date    CARDIAC SURG PROCEDURE UNLIST      cardiac catheterization    COLONOSCOPY N/A 9/30/2016    COLONOSCOPY with biopsy performed by Jm Minaya MD at [facility] ENDOSCOPY    HX BACK SURGERY      x3    HX COLONOSCOPY      HX ENDOSCOPY      HX HEENT      sinus sx    HX ORTHOPAEDIC      left shoulder, right knee, left thumb, left great toe sx    HX UROLOGICAL      Lithotripsy    ND COLSC FLX W/RMVL OF TUMOR POLYP LESION SNARE TQ N/A 09/30/2016    Dr. Cisco Macedo       History   Smoking Status    Never Smoker   Smokeless Tobacco    Never Used     Current Outpatient Prescriptions   Medication Sig    nitroglycerin (NITROLINGUAL) 400 mcg/spray spray 1 Spray by SubLINGual route every five (5) minutes as needed for Chest Pain for up to 3 doses.  amLODIPine (NORVASC) 2.5 mg tablet Take 1 Tab by mouth daily.  sertraline (ZOLOFT) 50 mg tablet Take 1 Tab by mouth daily.  CRESTOR 10 mg tablet Take 1 Tab by mouth daily.  Indications: hyperlipidemia    esomeprazole (NEXIUM) 40 mg capsule Take 1 Cap by mouth daily. Indications: gastroesophageal reflux disease    multivitamin (ONE A DAY) tablet Take 1 Tab by mouth daily.  cpap machine kit by Does Not Apply route. CPAP at 11 cm with humidifier. Mask: Wisp nasal, large. Length of need 99 months. Replace mask and accessories as needed times 12 months. Download data at 30 days and fax at [phone]. Dx- DB on CPAP, RLS, Obesity    isosorbide mononitrate ER (IMDUR) 60 mg CR tablet 30 mg daily.  LUMIGAN 0.01 % ophthalmic drops Administer 1 Drop to both eyes.  aspirin delayed-release 81 mg tablet Take  by mouth daily. No current facility-administered medications for this visit. Review of Systems   Constitutional: Positive for malaise/fatigue. Negative for chills and fever. HENT: Negative for congestion and sore throat. Respiratory: Negative for shortness of breath. Cardiovascular: Negative for chest pain, palpitations and leg swelling. Gastrointestinal: Positive for nausea. Negative for diarrhea and vomiting. Musculoskeletal: Negative for myalgias. Neurological: Positive for dizziness and headaches. Visit Vitals    /78    Pulse 68    Temp 98.6 °F (37 °C)    Resp 16    Ht 5' 9\" (1.753 m)    Wt 236 lb (107 kg)    SpO2 97%    BMI 34.85 kg/m2       Physical Exam   Constitutional: He is oriented to person, place, and time. He appears well-developed and well-nourished. No distress. HENT:   Right Ear: Tympanic membrane, external ear and ear canal normal.   Left Ear: Tympanic membrane, external ear and ear canal normal.   Nose: Nose normal.   Mouth/Throat: Oropharynx is clear and moist.   Neck: Neck supple. No thyromegaly present. Cardiovascular: Normal rate and regular rhythm. Exam reveals no gallop and no friction rub. No murmur heard. Pulmonary/Chest: Breath sounds normal. No respiratory distress. Abdominal: Soft. He exhibits no mass. There is no tenderness. Musculoskeletal: He exhibits no edema. Lymphadenopathy:     He has no cervical adenopathy. Neurological: He is alert and oriented to person, place, and time. Skin: Skin is warm and dry. Psychiatric: He has a normal mood and affect. His behavior is normal. Judgment and thought content normal.       ASSESSMENT and PLAN    ICD-10-CM ICD-9-CM    1. Gastroenteritis K52.9 558.9    2. Dizziness R42 780.4 CBC W/O DIFF      METABOLIC PANEL, COMPREHENSIVE     Follow-up Disposition:  Return if symptoms worsen or fail to improve. current treatment plan is effective, no change in therapy  lab results and schedule of future lab studies reviewed with patient  Plan of care reviewed - patient verbalize(s) understanding and agreement.

## 2017-04-18 LAB
ALBUMIN SERPL-MCNC: 4.2 G/DL (ref 3.5–5.5)
ALBUMIN/GLOB SERPL: 1.4 {RATIO} (ref 1.2–2.2)
ALP SERPL-CCNC: 63 IU/L (ref 39–117)
ALT SERPL-CCNC: 43 IU/L (ref 0–44)
AST SERPL-CCNC: 27 IU/L (ref 0–40)
BILIRUB SERPL-MCNC: 0.2 MG/DL (ref 0–1.2)
BUN SERPL-MCNC: 9 MG/DL (ref 6–24)
BUN/CREAT SERPL: 11 (ref 9–20)
CALCIUM SERPL-MCNC: 9.1 MG/DL (ref 8.7–10.2)
CHLORIDE SERPL-SCNC: 102 MMOL/L (ref 96–106)
CO2 SERPL-SCNC: 25 MMOL/L (ref 18–29)
CREAT SERPL-MCNC: 0.82 MG/DL (ref 0.76–1.27)
ERYTHROCYTE [DISTWIDTH] IN BLOOD BY AUTOMATED COUNT: 15 % (ref 12.3–15.4)
GLOBULIN SER CALC-MCNC: 2.9 G/DL (ref 1.5–4.5)
GLUCOSE SERPL-MCNC: 99 MG/DL (ref 65–99)
HCT VFR BLD AUTO: 41.3 % (ref 37.5–51)
HGB BLD-MCNC: 13.7 G/DL (ref 12.6–17.7)
MCH RBC QN AUTO: 28.8 PG (ref 26.6–33)
MCHC RBC AUTO-ENTMCNC: 33.2 G/DL (ref 31.5–35.7)
MCV RBC AUTO: 87 FL (ref 79–97)
PLATELET # BLD AUTO: 280 X10E3/UL (ref 150–379)
POTASSIUM SERPL-SCNC: 4.2 MMOL/L (ref 3.5–5.2)
PROT SERPL-MCNC: 7.1 G/DL (ref 6–8.5)
RBC # BLD AUTO: 4.76 X10E6/UL (ref 4.14–5.8)
SODIUM SERPL-SCNC: 143 MMOL/L (ref 134–144)
WBC # BLD AUTO: 6.9 X10E3/UL (ref 3.4–10.8)

## 2017-05-12 ENCOUNTER — PATIENT MESSAGE (OUTPATIENT)
Dept: UROLOGY | Age: 52
End: 2017-05-12

## 2017-06-30 DIAGNOSIS — E78.5 HYPERLIPIDEMIA, UNSPECIFIED HYPERLIPIDEMIA TYPE: ICD-10-CM

## 2017-06-30 RX ORDER — ROSUVASTATIN CALCIUM 10 MG/1
10 TABLET, FILM COATED ORAL DAILY
Qty: 90 TAB | Refills: 4 | Status: SHIPPED | OUTPATIENT
Start: 2017-06-30 | End: 2017-07-26

## 2017-07-07 ENCOUNTER — OFFICE VISIT (OUTPATIENT)
Dept: CARDIOLOGY CLINIC | Age: 52
End: 2017-07-07

## 2017-07-07 VITALS
HEART RATE: 70 BPM | HEIGHT: 69 IN | DIASTOLIC BLOOD PRESSURE: 73 MMHG | WEIGHT: 241 LBS | SYSTOLIC BLOOD PRESSURE: 109 MMHG | BODY MASS INDEX: 35.7 KG/M2

## 2017-07-07 DIAGNOSIS — G47.33 OSA ON CPAP: ICD-10-CM

## 2017-07-07 DIAGNOSIS — E78.5 HYPERLIPIDEMIA, UNSPECIFIED HYPERLIPIDEMIA TYPE: ICD-10-CM

## 2017-07-07 DIAGNOSIS — Z99.89 OSA ON CPAP: ICD-10-CM

## 2017-07-07 DIAGNOSIS — I10 ESSENTIAL HYPERTENSION WITH GOAL BLOOD PRESSURE LESS THAN 140/90: ICD-10-CM

## 2017-07-07 DIAGNOSIS — I25.111 CORONARY ARTERY DISEASE INVOLVING NATIVE CORONARY ARTERY OF NATIVE HEART WITH ANGINA PECTORIS WITH DOCUMENTED SPASM (HCC): Primary | ICD-10-CM

## 2017-07-07 NOTE — MR AVS SNAPSHOT
Visit Information Date & Time Provider Department Dept. Phone Encounter #  
 7/7/2017 11:30 AM Jesus Glover MD [facility] [phone] [id number] Follow-up Instructions Return in about 6 months (around 1/7/2018). Your Appointments 9/8/2017  9:30 AM  
ROUTINE CARE with Ryan Cortez MD  
[facility] ([address]) Appt Note: 6 mo f/u  
 [address] [place] [address] [facility] [address]  
[phone]  
  
   
 [address] Upcoming Health Maintenance Date Due DTaP/Tdap/Td series (1 - Tdap) 11/14/2012 INFLUENZA AGE 9 TO ADULT 8/1/2017 COLONOSCOPY 9/30/2021 Allergies as of 7/7/2017  Review Complete On: 7/7/2017 By: Jesus Glover MD  
 No Known Allergies Current Immunizations  Reviewed on 6/13/2016 Name Date Td 11/13/2012 Not reviewed this visit You Were Diagnosed With   
  
 Codes Comments Coronary artery disease involving native coronary artery of native heart with angina pectoris with documented spasm ([facility] [address]    -  Primary ICD-10-CM: I25.111 ICD-9-CM: 414.01, 413.9 stable 
angina 
occasional ntg use Essential hypertension with goal blood pressure less than 140/90     ICD-10-CM: I10 
ICD-9-CM: 401.9 controlled Hyperlipidemia, unspecified hyperlipidemia type     ICD-10-CM: E78.5 ICD-9-CM: 272.4 stable 
on statin DB on CPAP     ICD-10-CM: G47.33, Z99.89 ICD-9-CM: 327.23, V46.8 stable Vitals BP Pulse Height(growth percentile) Weight(growth percentile) BMI Smoking Status 109/73 70 5' 9\" (1.753 m) 241 lb (109.3 kg) 35.59 kg/m2 Never Smoker Vitals History BMI and BSA Data Body Mass Index Body Surface Area 35.59 kg/m 2 2.31 m 2 Preferred Pharmacy Pharmacy Name Phone [address] [facility] [phone] Your Updated Medication List  
  
   
 This list is accurate as of: 7/7/17 12:02 PM.  Always use your most recent med list. amLODIPine 2.5 mg tablet Commonly known as:  Mone Kern Take 1 Tab by mouth daily. aspirin delayed-release 81 mg tablet Take  by mouth daily. cpap machine kit  
by Does Not Apply route. CPAP at 11 cm with humidifier. Mask: Wisp nasal, large. Length of need 99 months. Replace mask and accessories as needed times 12 months. Download data at 30 days and fax at [phone]. Dx- DB on CPAP, RLS, Obesity CRESTOR 10 mg tablet Generic drug:  rosuvastatin Take 1 Tab by mouth daily. Indications: hyperlipidemia  
  
 esomeprazole 40 mg capsule Commonly known as:  Alphonza Fare Take 1 Cap by mouth daily. Indications: gastroesophageal reflux disease  
  
 isosorbide mononitrate ER 60 mg CR tablet Commonly known as:  IMDUR  
30 mg daily. LUMIGAN 0.01 % ophthalmic drops Generic drug:  bimatoprost  
Administer 1 Drop to both eyes. multivitamin tablet Commonly known as:  ONE A DAY Take 1 Tab by mouth daily. nitroglycerin 400 mcg/spray spray Commonly known as:  NITROLINGUAL  
1 [place] by SubLINGual route every five (5) minutes as needed for Chest Pain for up to 3 doses. sertraline 50 mg tablet Commonly known as:  ZOLOFT Take 1 Tab by mouth daily. Follow-up Instructions Return in about 6 months (around 1/7/2018). Introducing [facility]! Dear Mechelle Vu: Thank you for requesting a Linea account. Our records indicate that you already have an active Linea account. You can access your account anytime at [email or web address] Did you know that you can access your hospital and ER discharge instructions at any time in Linea? You can also review all of your test results from your hospital stay or ER visit. Additional Information If you have questions, please visit the Frequently Asked Questions section of the [email or web address] website at [email or web address] Remember, In[employer] Connect is NOT to be used for urgent needs. For medical emergencies, dial 911. Now available from your iPhone and Android! Please provide this summary of care documentation to your next provider. Your primary care clinician is listed as Ronal Atkins. If you have any questions after today's visit, please call [phone].

## 2017-07-07 NOTE — PROGRESS NOTES
1. Have you been to the ER, urgent care clinic since your last visit? Hospitalized since your last visit? Yes Where: [facility]/Stomach Pain    2. Have you seen or consulted any other health care providers outside of the [address] [place] [address] since your last visit? Include any pap smears or colon screening. Yes Where: PCP     3. Since your last visit, have you had any of the following symptoms? .           4. Have you had any blood work, X-rays or cardiac testing? Yes Where: [facility]             5.  Where do you normally have your labs drawn? [facility]    6. Do you need any refills today?    No

## 2017-07-07 NOTE — LETTER
Avery Laureanos 1965 7/7/2017 Dear MD Anne Srinivasan MD 
 
I had the pleasure of evaluating  Mr. Ashly Young in office today. Below are the relevant portions of my assessment and plan of care. ICD-10-CM ICD-9-CM 1. Coronary artery disease involving native coronary artery of native heart with angina pectoris with documented spasm ([facility]) I25.111 414.01   
  413.9   
 stable 
angina 
occasional ntg use 2. Essential hypertension with goal blood pressure less than 140/90 I10 401.9   
 controlled 3. Hyperlipidemia, unspecified hyperlipidemia type E78.5 272.4   
 stable 
on statin 4. DB on CPAP G47.33 327.23   
 Z99.89 V46.8   
 stable Current Outpatient Prescriptions Medication Sig Dispense Refill  CRESTOR 10 mg tablet Take 1 Tab by mouth daily. Indications: hyperlipidemia 90 Tab 4  
 nitroglycerin (NITROLINGUAL) 400 mcg/spray spray 1 Spray by SubLINGual route every five (5) minutes as needed for Chest Pain for up to 3 doses. 1 Bottle 1  
 amLODIPine (NORVASC) 2.5 mg tablet Take 1 Tab by mouth daily. 90 Tab 3  
 sertraline (ZOLOFT) 50 mg tablet Take 1 Tab by mouth daily. 90 Tab 4  
 esomeprazole (NEXIUM) 40 mg capsule Take 1 Cap by mouth daily. Indications: gastroesophageal reflux disease 90 Cap 4  
 aspirin delayed-release 81 mg tablet Take  by mouth daily.  multivitamin (ONE A DAY) tablet Take 1 Tab by mouth daily.  cpap machine kit by Does Not Apply route. CPAP at 11 cm with humidifier. Mask: Wisp nasal, large. Length of need 99 months. Replace mask and accessories as needed times 12 months. Download data at 30 days and fax at [phone]. Dx- DB on CPAP, RLS, Obesity 1 Kit 0  
 isosorbide mononitrate ER (IMDUR) 60 mg CR tablet 30 mg daily.  LUMIGAN 0.01 % ophthalmic drops Administer 1 Drop to both eyes. No orders of the defined types were placed in this encounter. If you have questions, please do not hesitate to call me. I look forward to following Mr. Diego Devlin along with you. Sincerely, Jb Tucker MD

## 2017-07-07 NOTE — PROGRESS NOTES
HISTORY OF PRESENT ILLNESS  Ashish Yap is a 46 y.o. male. HPI Comments: Patient with cad,coronary spasm,htn  On follow up patient denies any sob, palpitation or other significant symptoms. Cholesterol Problem   The history is provided by the patient. This is a chronic problem. The problem occurs constantly. The problem has not changed since onset. Associated symptoms include chest pain. Hypertension   The history is provided by the patient. This is a chronic problem. The problem occurs constantly. The problem has not changed since onset. Associated symptoms include chest pain. Chest Pain (Angina)    The history is provided by the patient. This is a recurrent problem. The problem has not changed since onset. The problem occurs every several days. The pain is associated with exertion and rest. The pain is present in the substernal region. The pain is mild. The quality of the pain is described as pressure-like. The pain does not radiate. Pertinent negatives include no claudication, no cough, no dizziness, no fever, no hemoptysis, no nausea, no orthopnea, no palpitations, no PND, no sputum production, no vomiting and no weakness. He has tried nitroglycerin for the symptoms. Review of Systems   Constitutional: Negative for chills and fever. HENT: Negative for nosebleeds. Eyes: Negative for blurred vision and double vision. Respiratory: Negative for cough, hemoptysis, sputum production and wheezing. Cardiovascular: Positive for chest pain. Negative for palpitations, orthopnea, claudication, leg swelling and PND. Gastrointestinal: Negative for heartburn, nausea and vomiting. Musculoskeletal: Negative for myalgias. Skin: Negative for rash. Neurological: Negative for dizziness and weakness. Endo/Heme/Allergies: Does not bruise/bleed easily.      Family History   Problem Relation Age of Onset    Hypertension Mother     Diabetes Mother     Hypertension Father     Cancer Father Prostate    Heart Disease Father      Heart Attack - 2013    Heart Attack Father     Stroke Brother     Migraines Brother     Cancer Sister      Breast    Migraines Sister     Stroke Brother        Past Medical History:   Diagnosis Date    Back pain     CAD (coronary artery disease)     Chest pain     Coronary artery spasm (HCC)     GERD (gastroesophageal reflux disease)     Headache     migraines    Hypertension     Kidney disease     Kidney stone     Nausea     Sleep apnea     Sleep apnea     uses cpap    Sleep disorder     Vomiting        Past Surgical History:   Procedure Laterality Date    CARDIAC SURG PROCEDURE UNLIST      cardiac catheterization    COLONOSCOPY N/A 9/30/2016    COLONOSCOPY with biopsy performed by Norma Toth MD at [facility] ENDOSCOPY    HX BACK SURGERY      x3    HX COLONOSCOPY      HX ENDOSCOPY      HX HEENT      sinus sx    HX ORTHOPAEDIC      left shoulder, right knee, left thumb, left great toe sx    HX UROLOGICAL      Lithotripsy    TX COLSC FLX W/RMVL OF TUMOR POLYP LESION SNARE TQ N/A 09/30/2016    Dr. Paola Almazan       Social History   Substance Use Topics    Smoking status: Never Smoker    Smokeless tobacco: Never Used    Alcohol use Yes      Comment: rare use       No Known Allergies    Outpatient Prescriptions Marked as Taking for the 7/7/17 encounter (Office Visit) with Roberta Roberto MD   Medication Sig Dispense Refill    CRESTOR 10 mg tablet Take 1 Tab by mouth daily. Indications: hyperlipidemia 90 Tab 4    nitroglycerin (NITROLINGUAL) 400 mcg/spray spray 1 Spray by SubLINGual route every five (5) minutes as needed for Chest Pain for up to 3 doses. 1 Bottle 1    amLODIPine (NORVASC) 2.5 mg tablet Take 1 Tab by mouth daily. 90 Tab 3    sertraline (ZOLOFT) 50 mg tablet Take 1 Tab by mouth daily. 90 Tab 4    esomeprazole (NEXIUM) 40 mg capsule Take 1 Cap by mouth daily.  Indications: gastroesophageal reflux disease 90 Cap 4    aspirin delayed-release 81 mg tablet Take  by mouth daily.  multivitamin (ONE A DAY) tablet Take 1 Tab by mouth daily.  cpap machine kit by Does Not Apply route. CPAP at 11 cm with humidifier. Mask: Wisp nasal, large. Length of need 99 months. Replace mask and accessories as needed times 12 months. Download data at 30 days and fax at [phone]. Dx- DB on CPAP, RLS, Obesity 1 Kit 0    isosorbide mononitrate ER (IMDUR) 60 mg CR tablet 30 mg daily.  LUMIGAN 0.01 % ophthalmic drops Administer 1 Drop to both eyes. Visit Vitals    /73    Pulse 70    Ht 5' 9\" (1.753 m)    Wt 109.3 kg (241 lb)    BMI 35.59 kg/m2         Physical Exam   Constitutional: He is oriented to person, place, and time. He appears well-developed and well-nourished. HENT:   Head: Normocephalic and atraumatic. Eyes: Conjunctivae are normal.   Neck: Neck supple. No JVD present. No tracheal deviation present. No thyromegaly present. Cardiovascular: Normal rate, regular rhythm and normal heart sounds. Exam reveals no gallop and no friction rub. No murmur heard. Pulmonary/Chest: Breath sounds normal. No respiratory distress. He has no wheezes. He has no rales. He exhibits no tenderness. Abdominal: Soft. There is no tenderness. Musculoskeletal: He exhibits no edema. Neurological: He is alert and oriented to person, place, and time. Skin: Skin is warm and dry. Psychiatric: He has a normal mood and affect. Mr. Venkat Thibodeaux has a reminder for a \"due or due soon\" health maintenance. I have asked that he contact his primary care provider for follow-up on this health maintenance. Cath-2004  rca spasm-  lcx 30-40%  Stress test-2015  Fixed ant defect  Echo-2015  Normal lvef  ekg-7/2016-  wnl    Assessment         ICD-10-CM ICD-9-CM    1.  Coronary artery disease involving native coronary artery of native heart with angina pectoris with documented spasm ([facility]) I25.111 414.01      413.9 stable  angina  occasional ntg use   2. Essential hypertension with goal blood pressure less than 140/90 I10 401.9     controlled   3. Hyperlipidemia, unspecified hyperlipidemia type E78.5 272.4     stable  on statin   4. DB on CPAP G47.33 327.23     Z99.89 V46.8     stable   Had tried cardizem in past -had drop in bp  No acei/normal lvef    There are no discontinued medications. No orders of the defined types were placed in this encounter. Follow-up Disposition:  Return in about 6 months (around 1/7/2018).

## 2017-07-26 RX ORDER — ROSUVASTATIN CALCIUM 10 MG/1
10 TABLET, COATED ORAL
Qty: 90 TAB | Refills: 4 | Status: SHIPPED | OUTPATIENT
Start: 2017-07-26 | End: 2018-09-17 | Stop reason: SDUPTHER

## 2017-07-26 NOTE — TELEPHONE ENCOUNTER
Received faxed request for generic for Crestor. Although brand product was NEVER specified, I will do this for him.

## 2017-10-20 ENCOUNTER — OFFICE VISIT (OUTPATIENT)
Dept: FAMILY MEDICINE CLINIC | Facility: CLINIC | Age: 52
End: 2017-10-20

## 2017-10-20 VITALS
RESPIRATION RATE: 20 BRPM | HEIGHT: 69 IN | DIASTOLIC BLOOD PRESSURE: 86 MMHG | WEIGHT: 242 LBS | SYSTOLIC BLOOD PRESSURE: 137 MMHG | HEART RATE: 69 BPM | BODY MASS INDEX: 35.84 KG/M2 | TEMPERATURE: 97.3 F | OXYGEN SATURATION: 97 %

## 2017-10-20 DIAGNOSIS — I25.111 CORONARY ARTERY DISEASE INVOLVING NATIVE CORONARY ARTERY OF NATIVE HEART WITH ANGINA PECTORIS WITH DOCUMENTED SPASM (HCC): Primary | ICD-10-CM

## 2017-10-20 DIAGNOSIS — K21.9 GASTROESOPHAGEAL REFLUX DISEASE WITHOUT ESOPHAGITIS: ICD-10-CM

## 2017-10-20 DIAGNOSIS — I10 ESSENTIAL HYPERTENSION WITH GOAL BLOOD PRESSURE LESS THAN 140/90: ICD-10-CM

## 2017-10-20 DIAGNOSIS — R51.9 CHRONIC DAILY HEADACHE: ICD-10-CM

## 2017-10-20 DIAGNOSIS — E78.5 HYPERLIPIDEMIA, UNSPECIFIED HYPERLIPIDEMIA TYPE: ICD-10-CM

## 2017-10-20 DIAGNOSIS — F32.9 MAJOR DEPRESSIVE DISORDER WITH SINGLE EPISODE, REMISSION STATUS UNSPECIFIED: ICD-10-CM

## 2017-10-20 DIAGNOSIS — G90.9 AUTONOMIC NEUROPATHY: ICD-10-CM

## 2017-10-20 RX ORDER — ISOSORBIDE MONONITRATE 60 MG/1
30 TABLET, EXTENDED RELEASE ORAL DAILY
Qty: 30 TAB | Refills: 0 | Status: SHIPPED | OUTPATIENT
Start: 2017-10-20 | End: 2017-10-20 | Stop reason: SDUPTHER

## 2017-10-20 RX ORDER — ISOSORBIDE MONONITRATE 30 MG/1
30 TABLET, EXTENDED RELEASE ORAL DAILY
Qty: 90 TAB | Refills: 3 | Status: SHIPPED | OUTPATIENT
Start: 2017-10-20 | End: 2018-10-28 | Stop reason: SDUPTHER

## 2017-10-20 RX ORDER — AMITRIPTYLINE HYDROCHLORIDE 25 MG/1
25 TABLET, FILM COATED ORAL
Qty: 30 TAB | Refills: 2 | Status: SHIPPED | OUTPATIENT
Start: 2017-10-20 | End: 2018-01-09 | Stop reason: SDUPTHER

## 2017-10-20 NOTE — PROGRESS NOTES
Chief Complaint   Patient presents with    Medication Refill     need script for local pharmacy and mail order.  Follow-up       1. Have you been to the ER, urgent care clinic since your last visit? Hospitalized since your last visit? No    2. Have you seen or consulted any other health care providers outside of the [facility] [place] since your last visit? Include any pap smears or colon screening.  No

## 2017-10-20 NOTE — PROGRESS NOTES
HISTORY OF PRESENT ILLNESS  Chevis Gottron is a 45 y.o. male. HPI Comments: Seen in follow-up for HTN, coronary artery spasm, autonomic neuropathy, GERD, sleep apnea (on CPAP), depression/anxiety, kidney stones (followed by Urology here), glaucoma. No problems with medications. GERD, depression well-controlled. Still having daily headaches (see previous notes). These date back many years, and he was diagnosed with migraines and treated with Amitriptyline (probably helpful). He needs a refill of Isosorbide. Medication Refill   Associated symptoms include headaches. Pertinent negatives include no chest pain and no shortness of breath. Follow-up   Associated symptoms include headaches. Pertinent negatives include no chest pain and no shortness of breath. Past Medical History:   Diagnosis Date    Back pain     CAD (coronary artery disease)     Chest pain     Coronary artery spasm (HCC)     GERD (gastroesophageal reflux disease)     Headache     migraines    Hypertension     Kidney disease     Kidney stone     Nausea     Sleep apnea     Sleep apnea     uses cpap    Sleep disorder     Vomiting        Past Surgical History:   Procedure Laterality Date    CARDIAC SURG PROCEDURE UNLIST      cardiac catheterization    COLONOSCOPY N/A 9/30/2016    COLONOSCOPY with biopsy performed by Aidan Ortiz MD at [facility] ENDOSCOPY    HX BACK SURGERY      x3    HX COLONOSCOPY      HX ENDOSCOPY      HX HEENT      sinus sx    HX ORTHOPAEDIC      left shoulder, right knee, left thumb, left great toe sx    HX UROLOGICAL      Lithotripsy    [place] COLSC FLX W/RMVL OF TUMOR POLYP LESION SNARE TQ N/A 09/30/2016    Dr. Yamilex Hannah       History   Smoking Status    Never Smoker   Smokeless Tobacco    Never Used     Current Outpatient Prescriptions   Medication Sig    rosuvastatin (CRESTOR) 10 mg tablet Take 1 Tab by mouth nightly.     nitroglycerin (NITROLINGUAL) 400 mcg/spray spray 1 Spray by SubLINGual route every five (5) minutes as needed for Chest Pain for up to 3 doses.  amLODIPine (NORVASC) 2.5 mg tablet Take 1 Tab by mouth daily.  sertraline (ZOLOFT) 50 mg tablet Take 1 Tab by mouth daily.  esomeprazole (NEXIUM) 40 mg capsule Take 1 Cap by mouth daily. Indications: gastroesophageal reflux disease    aspirin delayed-release 81 mg tablet Take  by mouth daily.  multivitamin (ONE A DAY) tablet Take 1 Tab by mouth daily.  cpap machine kit by Does Not Apply route. CPAP at 11 cm with humidifier. Mask: Wisp nasal, large. Length of need 99 months. Replace mask and accessories as needed times 12 months. Download data at 30 days and fax at [phone]. Dx- DB on CPAP, RLS, Obesity    isosorbide mononitrate ER (IMDUR) 60 mg CR tablet 30 mg daily.  LUMIGAN 0.01 % ophthalmic drops Administer 1 Drop to both eyes. No current facility-administered medications for this visit. Review of Systems   Constitutional: Negative for chills and fever. Respiratory: Negative for shortness of breath. Cardiovascular: Negative for chest pain, palpitations and leg swelling. Gastrointestinal: Negative for nausea and vomiting. Neurological: Positive for headaches. Negative for tingling and focal weakness. Psychiatric/Behavioral: The patient does not have insomnia. Visit Vitals    /86 (BP 1 Location: Right arm, BP Patient Position: Sitting)  Comment: xl cuff automated    Pulse 69    Temp 97.3 °F (36.3 °C) (Oral)    Resp 20    Ht 5' 9\" (1.753 m)    Wt 242 lb (109.8 kg)    SpO2 97%    BMI 35.74 kg/m2       Physical Exam   Constitutional: He is oriented to person, place, and time. He appears well-developed and well-nourished. No distress. Neck: Neck supple. No thyromegaly present. Carotid bruit absent   Cardiovascular: Normal rate, regular rhythm and intact distal pulses. Exam reveals no gallop and no friction rub. No murmur heard.   Pulmonary/Chest: Effort normal and breath sounds normal. No respiratory distress. Musculoskeletal: He exhibits no edema. Lymphadenopathy:     He has no cervical adenopathy. Neurological: He is alert and oriented to person, place, and time. Skin: Skin is warm and dry. Psychiatric: He has a normal mood and affect. His behavior is normal. Judgment and thought content normal.   Nursing note and vitals reviewed. ASSESSMENT and PLAN    ICD-10-CM ICD-9-CM    1. Coronary artery disease involving native coronary artery of native heart with angina pectoris with documented spasm ([facility]) I25.111 414.01 isosorbide mononitrate ER (IMDUR) 30 mg tablet     413.9 DISCONTINUED: isosorbide mononitrate ER (IMDUR) 60 mg CR tablet   2. Essential hypertension with goal blood pressure less than 140/90 I10 401.9    3. Gastroesophageal reflux disease without esophagitis K21.9 530.81    4. Major depressive disorder with single episode, remission status unspecified F32.9 296.20    5. Autonomic neuropathy G90.9 337.9    6. Hyperlipidemia, unspecified hyperlipidemia type E78.5 272.4    7. Chronic daily headache R51 784.0 amitriptyline (ELAVIL) 25 mg tablet     Follow-up Disposition:  Return in about 6 months (around 4/20/2018). the following changes in treatment are made: Add Amitriptyline for headache prophylaxis. He can call for mail-order prescription if this dosage works. Refills as noted. lab results and schedule of future lab studies reviewed with patient  reviewed medications and side effects in detail  Plan of care reviewed - patient verbalize(s) understanding and agreement.

## 2018-01-05 ENCOUNTER — OFFICE VISIT (OUTPATIENT)
Dept: CARDIOLOGY CLINIC | Age: 53
End: 2018-01-05

## 2018-01-05 VITALS
WEIGHT: 248 LBS | DIASTOLIC BLOOD PRESSURE: 92 MMHG | HEART RATE: 108 BPM | SYSTOLIC BLOOD PRESSURE: 147 MMHG | HEIGHT: 69 IN | BODY MASS INDEX: 36.73 KG/M2

## 2018-01-05 DIAGNOSIS — I10 ESSENTIAL HYPERTENSION WITH GOAL BLOOD PRESSURE LESS THAN 140/90: ICD-10-CM

## 2018-01-05 DIAGNOSIS — G47.33 OSA ON CPAP: ICD-10-CM

## 2018-01-05 DIAGNOSIS — Z99.89 OSA ON CPAP: ICD-10-CM

## 2018-01-05 DIAGNOSIS — I25.111 CORONARY ARTERY DISEASE INVOLVING NATIVE CORONARY ARTERY OF NATIVE HEART WITH ANGINA PECTORIS WITH DOCUMENTED SPASM (HCC): Primary | ICD-10-CM

## 2018-01-05 DIAGNOSIS — E78.5 HYPERLIPIDEMIA, UNSPECIFIED HYPERLIPIDEMIA TYPE: ICD-10-CM

## 2018-01-05 PROBLEM — F33.9 RECURRENT DEPRESSION (HCC): Status: ACTIVE | Noted: 2018-01-05

## 2018-01-05 NOTE — MR AVS SNAPSHOT
Visit Information Date & Time Provider Department Dept. Phone Encounter #  
 1/5/2018 12:00 PM Carvin Ganser, MD [facility] [phone] Follow-up Instructions Return in about 6 months (around 7/5/2018). Upcoming Health Maintenance Date Due DTaP/Tdap/Td series (1 - Tdap) 11/14/2012 COLONOSCOPY 9/30/2021 Allergies as of 1/5/2018  Review Complete On: 1/5/2018 By: Carvin Ganser, MD  
 No Known Allergies Current Immunizations  Reviewed on 6/13/2016 Name Date Influenza Vaccine (Quad) PF 9/1/2017 12:00 AM  
 Td 11/13/2012 Not reviewed this visit You Were Diagnosed With   
  
 Codes Comments Coronary artery disease involving native coronary artery of native heart with angina pectoris with documented spasm ([facility] [address]    -  Primary ICD-10-CM: I25.111 ICD-9-CM: 414.01, 413.9 stable 
occ ntg use Essential hypertension with goal blood pressure less than 140/90     ICD-10-CM: I10 
ICD-9-CM: 401.9 mildly elvated 
monitor Hyperlipidemia, unspecified hyperlipidemia type     ICD-10-CM: E78.5 ICD-9-CM: 272.4 stable 
on statin 
check lab DB on CPAP     ICD-10-CM: G47.33, Z99.89 ICD-9-CM: 327.23, V46.8 stable Vitals BP Pulse Height(growth percentile) Weight(growth percentile) BMI Smoking Status (!) 147/92 (!) 108 5' 9\" (1.753 m) 248 lb (112.5 kg) 36.62 kg/m2 Never Smoker Vitals History BMI and BSA Data Body Mass Index Body Surface Area  
 36.62 kg/m 2 2.34 m 2 Preferred Pharmacy Pharmacy Name Phone [id number] [place], [address] [facility] [phone] Your Updated Medication List  
  
   
This list is accurate as of: 1/5/18 12:42 PM.  Always use your most recent med list.  
  
  
  
  
 amitriptyline 25 mg tablet Commonly known as:  ELAVIL Take 1 Tab by mouth nightly. Indications: MIGRAINE PREVENTION  
  
 amLODIPine 2.5 mg tablet Commonly known as:  Susana Dey Take 1 Tab by mouth daily. aspirin delayed-release 81 mg tablet Take  by mouth daily. cpap machine kit  
by Does Not Apply route. CPAP at 11 cm with humidifier. Mask: Wisp nasal, large. Length of need 99 months. Replace mask and accessories as needed times 12 months. Download data at 30 days and fax at [phone]. Dx- DB on CPAP, RLS, Obesity  
  
 esomeprazole 40 mg capsule Commonly known as:  Klaudia Holter Take 1 Cap by mouth daily. Indications: gastroesophageal reflux disease  
  
 isosorbide mononitrate ER 30 mg tablet Commonly known as:  IMDUR Take 1 Tab by mouth daily. Indications: Chronic Stable Angina Pectoris LUMIGAN 0.01 % ophthalmic drops Generic drug:  bimatoprost  
Administer 1 Drop to both eyes. multivitamin tablet Commonly known as:  ONE A DAY Take 1 Tab by mouth daily. nitroglycerin 400 mcg/spray spray Commonly known as:  NITROLINGUAL  
1 Harmans by SubLINGual route every five (5) minutes as needed for Chest Pain for up to 3 doses. rosuvastatin 10 mg tablet Commonly known as:  CRESTOR Take 1 Tab by mouth nightly. sertraline 50 mg tablet Commonly known as:  ZOLOFT Take 1 Tab by mouth daily. Follow-up Instructions Return in about 6 months (around 7/5/2018). To-Do List   
 01/05/2018 Lab:  HEPATIC FUNCTION PANEL   
  
 01/05/2018 Lab:  LIPID PANEL Introducing [facility]! Dear Juan M Grewal: Thank you for requesting a SoFi account. Our records indicate that you already have an active SoFi account. You can access your account anytime at [email or web address] Did you know that you can access your hospital and ER discharge instructions at any time in SoFi? You can also review all of your test results from your hospital stay or ER visit. Additional Information If you have questions, please visit the Frequently Asked Questions section of the [email or web address] website at [email or web address] Remember, [facility] is NOT to be used for urgent needs. For medical emergencies, dial 911. Now available from your iPhone and Android! Please provide this summary of care documentation to your next provider. Your primary care clinician is listed as Ilya Basurto. If you have any questions after today's visit, please call [phone].

## 2018-01-05 NOTE — PROGRESS NOTES
HISTORY OF PRESENT ILLNESS  Gil Rodriguez is a 45 y.o. male. HPI Comments: Patient with cad,coronary spasm,htn  On follow up patient denies any sob, palpitation or other significant symptoms. Hypertension   The history is provided by the patient. This is a chronic problem. The problem occurs constantly. The problem has not changed since onset. Associated symptoms include chest pain. Cholesterol Problem   The history is provided by the patient. This is a chronic problem. The problem occurs constantly. The problem has not changed since onset. Associated symptoms include chest pain. Chest Pain (Angina)    The history is provided by the patient. This is a recurrent problem. The problem has not changed since onset. The problem occurs every several days. The pain is associated with exertion and rest. The pain is present in the substernal region. The pain is mild. The quality of the pain is described as pressure-like. The pain does not radiate. Pertinent negatives include no claudication, no cough, no dizziness, no fever, no hemoptysis, no nausea, no orthopnea, no palpitations, no PND, no sputum production, no vomiting and no weakness. He has tried nitroglycerin for the symptoms. Review of Systems   Constitutional: Negative for chills and fever. HENT: Negative for nosebleeds. Eyes: Negative for blurred vision and double vision. Respiratory: Negative for cough, hemoptysis, sputum production and wheezing. Cardiovascular: Positive for chest pain. Negative for palpitations, orthopnea, claudication, leg swelling and PND. Gastrointestinal: Negative for heartburn, nausea and vomiting. Musculoskeletal: Negative for myalgias. Skin: Negative for rash. Neurological: Negative for dizziness and weakness. Endo/Heme/Allergies: Does not bruise/bleed easily.      Family History   Problem Relation Age of Onset    Hypertension Mother     Diabetes Mother     Hypertension Father     Cancer Father Prostate    Heart Disease Father      Heart Attack - 2013    Heart Attack Father     Stroke Brother     Migraines Brother     Cancer Sister      Breast    Migraines Sister     Stroke Brother        Past Medical History:   Diagnosis Date    Back pain     CAD (coronary artery disease)     Chest pain     Coronary artery spasm (HCC)     GERD (gastroesophageal reflux disease)     Headache     migraines    Hypertension     Kidney disease     Kidney stone     Nausea     Sleep apnea     Sleep apnea     uses cpap    Sleep disorder     Vomiting        Past Surgical History:   Procedure Laterality Date    CARDIAC SURG PROCEDURE UNLIST      cardiac catheterization    COLONOSCOPY N/A 9/30/2016    COLONOSCOPY with biopsy performed by Buddy Schwartz MD at [facility]OSCOPY    HX BACK SURGERY      x3    HX COLONOSCOPY      HX ENDOSCOPY      HX HEENT      sinus sx    HX ORTHOPAEDIC      left shoulder, right knee, left thumb, left great toe sx    HX UROLOGICAL      Lithotripsy    [place] COLSC FLX W/RMVL OF TUMOR POLYP LESION SNARE TQ N/A 09/30/2016    Dr. Rigoberto Mcdonald       Social History   Substance Use Topics    Smoking status: Never Smoker    Smokeless tobacco: Never Used    Alcohol use Yes      Comment: rare use       No Known Allergies    Outpatient Prescriptions Marked as Taking for the 1/5/18 encounter (Office Visit) with Jaxon Esquivel MD   Medication Sig Dispense Refill    isosorbide mononitrate ER (IMDUR) 30 mg tablet Take 1 Tab by mouth daily. Indications: Chronic Stable Angina Pectoris 90 Tab 3    amitriptyline (ELAVIL) 25 mg tablet Take 1 Tab by mouth nightly. Indications: MIGRAINE PREVENTION 30 Tab 2    rosuvastatin (CRESTOR) 10 mg tablet Take 1 Tab by mouth nightly. 90 Tab 4    nitroglycerin (NITROLINGUAL) 400 mcg/spray spray 1 Spray by SubLINGual route every five (5) minutes as needed for Chest Pain for up to 3 doses.  1 Bottle 1    amLODIPine (NORVASC) 2.5 mg tablet Take 1 Tab by mouth daily. 90 Tab 3    sertraline (ZOLOFT) 50 mg tablet Take 1 Tab by mouth daily. 90 Tab 4    esomeprazole (NEXIUM) 40 mg capsule Take 1 Cap by mouth daily. Indications: gastroesophageal reflux disease 90 Cap 4    aspirin delayed-release 81 mg tablet Take  by mouth daily.  multivitamin (ONE A DAY) tablet Take 1 Tab by mouth daily.  cpap machine kit by Does Not Apply route. CPAP at 11 cm with humidifier. Mask: Wisp nasal, large. Length of need 99 months. Replace mask and accessories as needed times 12 months. Download data at 30 days and fax at [phone]. Dx- DB on CPAP, RLS, Obesity 1 Kit 0    LUMIGAN 0.01 % ophthalmic drops Administer 1 Drop to both eyes. Visit Vitals    BP (!) 147/92    Pulse (!) 108    Ht 5' 9\" (1.753 m)    Wt 112.5 kg (248 lb)    BMI 36.62 kg/m2         Physical Exam   Constitutional: He is oriented to person, place, and time. He appears well-developed and well-nourished. HENT:   Head: Normocephalic and atraumatic. Eyes: Conjunctivae are normal.   Neck: Neck supple. No JVD present. No tracheal deviation present. No thyromegaly present. Cardiovascular: Normal rate, regular rhythm and normal heart sounds. Exam reveals no gallop and no friction rub. No murmur heard. Pulmonary/Chest: Breath sounds normal. No respiratory distress. He has no wheezes. He has no rales. He exhibits no tenderness. Abdominal: Soft. There is no tenderness. Musculoskeletal: He exhibits no edema. Neurological: He is alert and oriented to person, place, and time. Skin: Skin is warm and dry. Psychiatric: He has a normal mood and affect. Mr. José Luis Gonzalez has a reminder for a \"due or due soon\" health maintenance. I have asked that he contact his primary care provider for follow-up on this health maintenance. Cath-2004  rca spasm-  lcx 30-40%  Stress test-2015  Fixed ant defect  Echo-2015  Normal lvef  ekg-7/2016-  wnl    Assessment         ICD-10-CM ICD-9-CM    1.  Coronary artery disease involving native coronary artery of native heart with angina pectoris with documented spasm (HCC) I25.111 414.01      413.9     stable  occ ntg use   2. Essential hypertension with goal blood pressure less than 140/90 I10 401.9     mildly elvated  monitor   3. Hyperlipidemia, unspecified hyperlipidemia type E78.5 272.4 HEPATIC FUNCTION PANEL      LIPID PANEL    stable  on statin  check lab   4. DB on CPAP G47.33 327.23     Z99.89 V46.8     stable   Had tried cardizem in past -had drop in bp  No acei/normal lvef    There are no discontinued medications. Orders Placed This Encounter    HEPATIC FUNCTION PANEL     Standing Status:   Future     Standing Expiration Date:   7/6/2018    LIPID PANEL     Standing Status:   Future     Standing Expiration Date:   7/6/2018       Follow-up Disposition:  Return in about 6 months (around 7/5/2018).

## 2018-01-05 NOTE — PROGRESS NOTES
1. Have you been to the ER, urgent care clinic since your last visit? Hospitalized since your last visit?     no    2. Have you seen or consulted any other health care providers outside of the [address] [place] [address] since your last visit? Include any pap smears or colon screening. Yes, pcp    3. Since your last visit, have you had any of the following symptoms? Chest pressure        4. Have you had any blood work, X-rays or cardiac testing?    no            5.  Where do you normally have your labs drawn? [facility]    6. Do you need any refills today?    no

## 2018-03-26 RX ORDER — AMLODIPINE BESYLATE 2.5 MG/1
TABLET ORAL
Qty: 90 TAB | Refills: 3 | Status: SHIPPED | OUTPATIENT
Start: 2018-03-26 | End: 2019-01-31

## 2018-04-27 ENCOUNTER — OFFICE VISIT (OUTPATIENT)
Dept: FAMILY MEDICINE CLINIC | Facility: CLINIC | Age: 53
End: 2018-04-27

## 2018-04-27 VITALS
BODY MASS INDEX: 37.03 KG/M2 | DIASTOLIC BLOOD PRESSURE: 79 MMHG | RESPIRATION RATE: 16 BRPM | HEIGHT: 69 IN | WEIGHT: 250 LBS | HEART RATE: 89 BPM | SYSTOLIC BLOOD PRESSURE: 125 MMHG | OXYGEN SATURATION: 97 % | TEMPERATURE: 97.7 F

## 2018-04-27 DIAGNOSIS — Z99.89 OSA ON CPAP: ICD-10-CM

## 2018-04-27 DIAGNOSIS — I10 ESSENTIAL HYPERTENSION WITH GOAL BLOOD PRESSURE LESS THAN 140/90: Primary | ICD-10-CM

## 2018-04-27 DIAGNOSIS — E78.5 HYPERLIPIDEMIA, UNSPECIFIED HYPERLIPIDEMIA TYPE: ICD-10-CM

## 2018-04-27 DIAGNOSIS — F33.9 RECURRENT DEPRESSION (HCC): ICD-10-CM

## 2018-04-27 DIAGNOSIS — I25.111 CORONARY ARTERY DISEASE INVOLVING NATIVE CORONARY ARTERY OF NATIVE HEART WITH ANGINA PECTORIS WITH DOCUMENTED SPASM (HCC): ICD-10-CM

## 2018-04-27 DIAGNOSIS — G90.9 AUTONOMIC NEUROPATHY: ICD-10-CM

## 2018-04-27 DIAGNOSIS — G47.33 OSA ON CPAP: ICD-10-CM

## 2018-04-27 DIAGNOSIS — F32.4 MAJOR DEPRESSIVE DISORDER WITH SINGLE EPISODE, IN PARTIAL REMISSION (HCC): ICD-10-CM

## 2018-04-27 DIAGNOSIS — R51.9 CHRONIC DAILY HEADACHE: ICD-10-CM

## 2018-04-27 DIAGNOSIS — K21.9 GASTROESOPHAGEAL REFLUX DISEASE WITHOUT ESOPHAGITIS: ICD-10-CM

## 2018-04-27 PROBLEM — E66.01 SEVERE OBESITY (BMI 35.0-39.9) WITH COMORBIDITY (HCC): Status: ACTIVE | Noted: 2018-04-27

## 2018-04-27 RX ORDER — SERTRALINE HYDROCHLORIDE 50 MG/1
50 TABLET, FILM COATED ORAL DAILY
Qty: 90 TAB | Refills: 4 | Status: SHIPPED | OUTPATIENT
Start: 2018-04-27 | End: 2019-06-13 | Stop reason: SDUPTHER

## 2018-04-27 RX ORDER — NORTRIPTYLINE HYDROCHLORIDE 25 MG/1
25 CAPSULE ORAL
Qty: 90 CAP | Refills: 3 | Status: SHIPPED | OUTPATIENT
Start: 2018-04-27 | End: 2019-06-13 | Stop reason: SDUPTHER

## 2018-04-27 RX ORDER — ESOMEPRAZOLE MAGNESIUM 40 MG/1
40 CAPSULE, DELAYED RELEASE ORAL DAILY
Qty: 90 CAP | Refills: 4 | Status: SHIPPED | OUTPATIENT
Start: 2018-04-27 | End: 2019-05-23 | Stop reason: SDUPTHER

## 2018-04-27 NOTE — PROGRESS NOTES
HISTORY OF PRESENT ILLNESS  Christelle Farley is a 45 y.o. male. HPI Comments:  Seen in follow-up for HTN, coronary artery spasm, autonomic neuropathy, GERD, sleep apnea (on CPAP), depression/anxiety, kidney stones (followed by Urology here), glaucoma. No problems with medication, except that he is drowsy with Amitriptyline (even though taken at bedtime). He is having about 50% as many headaches on this medication. No problems with reflux or depression. He has labs ordered by Cardiology. Cholesterol Problem   Associated symptoms include headaches. Pertinent negatives include no chest pain and no shortness of breath. Hypertension    Associated symptoms include headaches. Pertinent negatives include no chest pain, no palpitations, no shortness of breath, no nausea and no vomiting.        Past Medical History:   Diagnosis Date    Back pain     CAD (coronary artery disease)     Chest pain     Coronary artery spasm (HCC)     GERD (gastroesophageal reflux disease)     Headache     migraines    Hypertension     Kidney disease     Kidney stone     Nausea     Sleep apnea     Sleep apnea     uses cpap    Sleep disorder     Vomiting        Past Surgical History:   Procedure Laterality Date    CARDIAC SURG PROCEDURE UNLIST      cardiac catheterization    COLONOSCOPY N/A 9/30/2016    COLONOSCOPY with biopsy performed by Ronnie Singleton MD at [facility] ENDOSCOPY    HX BACK SURGERY      x3    HX COLONOSCOPY      HX ENDOSCOPY      HX HEENT      sinus sx    HX ORTHOPAEDIC      left shoulder, right knee, left thumb, left great toe sx    HX UROLOGICAL      Lithotripsy    [place] COLSC FLX W/RMVL OF TUMOR POLYP LESION SNARE TQ N/A 09/30/2016    Dr. Estevan Chiang       History   Smoking Status    Never Smoker   Smokeless Tobacco    Never Used     Current Outpatient Prescriptions   Medication Sig    amLODIPine (NORVASC) 2.5 mg tablet TAKE 1 TABLET DAILY    amitriptyline (ELAVIL) 25 mg tablet TAKE 1 TAB BY MOUTH NIGHTLY FOR MIGRAINE PREVENTION    isosorbide mononitrate ER (IMDUR) 30 mg tablet Take 1 Tab by mouth daily. Indications: Chronic Stable Angina Pectoris    rosuvastatin (CRESTOR) 10 mg tablet Take 1 Tab by mouth nightly.  nitroglycerin (NITROLINGUAL) 400 mcg/spray spray 1 Spray by SubLINGual route every five (5) minutes as needed for Chest Pain for up to 3 doses.  sertraline (ZOLOFT) 50 mg tablet Take 1 Tab by mouth daily.  esomeprazole (NEXIUM) 40 mg capsule Take 1 Cap by mouth daily. Indications: gastroesophageal reflux disease    aspirin delayed-release 81 mg tablet Take  by mouth daily.  multivitamin (ONE A DAY) tablet Take 1 Tab by mouth daily.  cpap machine kit by Does Not Apply route. CPAP at 11 cm with humidifier. Mask: Wisp nasal, large. Length of need 99 months. Replace mask and accessories as needed times 12 months. Download data at 30 days and fax at [phone]. Dx- DB on CPAP, RLS, Obesity    LUMIGAN 0.01 % ophthalmic drops Administer 1 Drop to both eyes. No current facility-administered medications for this visit. Review of Systems   Constitutional: Negative for chills and fever. Respiratory: Negative for shortness of breath. Cardiovascular: Negative for chest pain, palpitations and leg swelling. Gastrointestinal: Negative for nausea and vomiting. Neurological: Positive for headaches. Negative for tingling and focal weakness. Psychiatric/Behavioral: Negative for depression. The patient does not have insomnia. Visit Vitals    /79    Pulse 89    Temp 97.7 °F (36.5 °C)    Resp 16    Ht 5' 9\" (1.753 m)    Wt 250 lb (113.4 kg)    SpO2 97%    BMI 36.92 kg/m2       Physical Exam   Constitutional: He is oriented to person, place, and time. He appears well-developed and well-nourished. No distress. Neck: Neck supple. No thyromegaly present. Carotid bruit absent   Cardiovascular: Normal rate, regular rhythm and intact distal pulses.   Exam reveals no gallop and no friction rub. No murmur heard. Pulmonary/Chest: Effort normal and breath sounds normal. No respiratory distress. Musculoskeletal: He exhibits no edema. Lymphadenopathy:     He has no cervical adenopathy. Neurological: He is alert and oriented to person, place, and time. Skin: Skin is warm and dry. Psychiatric: He has a normal mood and affect. His behavior is normal. Judgment and thought content normal.   Nursing note and vitals reviewed. ASSESSMENT and PLAN    ICD-10-CM ICD-9-CM    1. Essential hypertension with goal blood pressure less than 140/90 I10 401.9    2. Chronic daily headache R51 784.0 nortriptyline (PAMELOR) 25 mg capsule   3. Recurrent depression (HCC) F33.9 296.30    4. Coronary artery disease involving native coronary artery of native heart with angina pectoris with documented spasm ([facility] [address] I25.111 414.01      413.9    5. Hyperlipidemia, unspecified hyperlipidemia type E78.5 272.4    6. Gastroesophageal reflux disease without esophagitis K21.9 530.81 esomeprazole (NEXIUM) 40 mg capsule   7. Autonomic neuropathy G90.9 337.9    8. DB on CPAP G47.33 327.23     Z99.89 V46.8    9. Major depressive disorder with single episode, [facility] F32.4 296.25 sertraline (ZOLOFT) 50 mg tablet     Follow-up Disposition:  Return in about 6 months (around 10/27/2018). the following changes in treatment are made: Change from Amitriptyline to Pamelor (can call to increase dose if needed and tolerated). lab results and schedule of future lab studies reviewed with patient - ordered by Cardiology  reviewed diet, exercise and weight control  reviewed medications and side effects in detail    Discussed the patient's BMI with him. The BMI follow up plan is as follows:     dietary management education, guidance, and counseling  encourage exercise  monitor weight  prescribed dietary intake    Plan of care reviewed - patient verbalize(s) understanding and agreement.

## 2018-04-27 NOTE — PROGRESS NOTES
1. Have you been to the ER, urgent care clinic since your last visit? Hospitalized since your last visit? Yes When: 04/10/18 HBV vomiting    2. Have you seen or consulted any other health care providers outside of the [address] [place] [address] since your last visit? Include any pap smears or colon screening.  No

## 2018-04-27 NOTE — PATIENT INSTRUCTIONS
Body Mass Index: Care Instructions  Your Care Instructions    Body mass index (BMI) can help you see if your weight is raising your risk for health problems. It uses a formula to compare how much you weigh with how tall you are. · A BMI lower than 18.5 is considered underweight. · A BMI between 18.5 and 24.9 is considered healthy. · A BMI between 25 and 29.9 is considered overweight. A BMI of 30 or higher is considered obese. If your BMI is in the normal range, it means that you have a lower risk for weight-related health problems. If your BMI is in the overweight or obese range, you may be at increased risk for weight-related health problems, such as high blood pressure, heart disease, stroke, arthritis or joint pain, and diabetes. If your BMI is in the underweight range, you may be at increased risk for health problems such as fatigue, lower protection (immunity) against illness, muscle loss, bone loss, hair loss, and hormone problems. BMI is just one measure of your risk for weight-related health problems. You may be at higher risk for health problems if you are not active, you eat an unhealthy diet, or you drink too much alcohol or use tobacco products. Follow-up care is a key part of your treatment and safety. Be sure to make and go to all appointments, and call your doctor if you are having problems. It's also a good idea to know your test results and keep a list of the medicines you take. How can you care for yourself at home? · Practice healthy eating habits. This includes eating plenty of fruits, vegetables, whole grains, lean protein, and low-fat dairy. · If your doctor recommends it, get more exercise. Walking is a good choice. Bit by bit, increase the amount you walk every day. Try for at least 30 minutes on most days of the week. · Do not smoke. Smoking can increase your risk for health problems. If you need help quitting, talk to your doctor about stop-smoking programs and medicines. These can increase your chances of quitting for good. · Limit alcohol to 2 drinks a day for men and 1 drink a day for women. Too much alcohol can cause health problems. If you have a BMI higher than 25  · Your doctor may do other tests to check your risk for weight-related health problems. This may include measuring the distance around your waist. A waist measurement of more than 40 inches in men or 35 inches in women can increase the risk of weight-related health problems. · Talk with your doctor about steps you can take to stay healthy or improve your health. You may need to make lifestyle changes to lose weight and stay healthy, such as changing your diet and getting regular exercise. If you have a BMI lower than 18.5  · Your doctor may do other tests to check your risk for health problems. · Talk with your doctor about steps you can take to stay healthy or improve your health. You may need to make lifestyle changes to gain or maintain weight and stay healthy, such as getting more healthy foods in your diet and doing exercises to build muscle. Where can you learn more? Go to http://sandra-ramana.info/. Enter S176 in the search box to learn more about \"Body Mass Index: Care Instructions. \"  Current as of: October 13, 2016  Content Version: 11.4  © 1983-6680 [employer]wise, [employer]. Care instructions adapted under license by [employer] (which disclaims liability or warranty for this information). If you have questions about a medical condition or this instruction, always ask your healthcare professional. [address] any warranty or liability for your use of this information.

## 2018-06-08 ENCOUNTER — HOSPITAL ENCOUNTER (OUTPATIENT)
Dept: LAB | Age: 53
Discharge: HOME | End: 2018-06-08

## 2018-06-08 PROCEDURE — 99001 SPECIMEN HANDLING PT-LAB: CPT | Performed by: INTERNAL MEDICINE

## 2018-06-13 RX ORDER — AMLODIPINE BESYLATE 2.5 MG/1
TABLET ORAL
Qty: 30 TAB | Refills: 2 | Status: SHIPPED | OUTPATIENT
Start: 2018-06-13 | End: 2018-07-20 | Stop reason: SDUPTHER

## 2018-07-20 ENCOUNTER — OFFICE VISIT (OUTPATIENT)
Dept: CARDIOLOGY CLINIC | Age: 53
End: 2018-07-20

## 2018-07-20 VITALS
HEART RATE: 93 BPM | HEIGHT: 69 IN | SYSTOLIC BLOOD PRESSURE: 143 MMHG | WEIGHT: 250 LBS | BODY MASS INDEX: 37.03 KG/M2 | DIASTOLIC BLOOD PRESSURE: 88 MMHG

## 2018-07-20 DIAGNOSIS — I25.111 CORONARY ARTERY DISEASE INVOLVING NATIVE CORONARY ARTERY OF NATIVE HEART WITH ANGINA PECTORIS WITH DOCUMENTED SPASM (HCC): Primary | ICD-10-CM

## 2018-07-20 DIAGNOSIS — G47.33 OSA ON CPAP: ICD-10-CM

## 2018-07-20 DIAGNOSIS — Z99.89 OSA ON CPAP: ICD-10-CM

## 2018-07-20 DIAGNOSIS — I10 ESSENTIAL HYPERTENSION WITH GOAL BLOOD PRESSURE LESS THAN 140/90: ICD-10-CM

## 2018-07-20 DIAGNOSIS — E78.5 HYPERLIPIDEMIA, UNSPECIFIED HYPERLIPIDEMIA TYPE: ICD-10-CM

## 2018-07-20 NOTE — PROGRESS NOTES
HISTORY OF PRESENT ILLNESS  Carlos Luciano is a 52 y.o. male. HPI Comments: Patient with cad,coronary spasm,htn  On follow up patient denies any sob, palpitation or other significant symptoms. Hypertension   The history is provided by the patient. This is a chronic problem. The problem occurs constantly. The problem has not changed since onset. Associated symptoms include chest pain. Cholesterol Problem   The history is provided by the patient. This is a chronic problem. The problem occurs constantly. The problem has not changed since onset. Associated symptoms include chest pain. Chest Pain (Angina)    The history is provided by the patient. This is a recurrent problem. The problem has not changed since onset. The problem occurs every several days. The pain is associated with exertion and rest. The pain is present in the substernal region. The pain is mild. The quality of the pain is described as pressure-like. The pain does not radiate. Pertinent negatives include no claudication, no cough, no dizziness, no fever, no hemoptysis, no nausea, no orthopnea, no palpitations, no PND, no sputum production, no vomiting and no weakness. He has tried nitroglycerin for the symptoms. Review of Systems   Constitutional: Negative for chills and fever. HENT: Negative for nosebleeds. Eyes: Negative for blurred vision and double vision. Respiratory: Negative for cough, hemoptysis, sputum production and wheezing. Cardiovascular: Positive for chest pain. Negative for palpitations, orthopnea, claudication, leg swelling and PND. Gastrointestinal: Negative for heartburn, nausea and vomiting. Musculoskeletal: Negative for myalgias. Skin: Negative for rash. Neurological: Negative for dizziness and weakness. Endo/Heme/Allergies: Does not bruise/bleed easily.      Family History   Problem Relation Age of Onset    Hypertension Mother     Diabetes Mother     Hypertension Father     Cancer Father Prostate    Heart Disease Father      Heart Attack - 2013    Heart Attack Father     Stroke Brother     Migraines Brother     Cancer Sister      Breast    Migraines Sister     Stroke Brother        Past Medical History:   Diagnosis Date    Back pain     CAD (coronary artery disease)     Chest pain     Coronary artery spasm (HCC)     GERD (gastroesophageal reflux disease)     Headache     migraines    Hypertension     Kidney disease     Kidney stone     Nausea     Sleep apnea     Sleep apnea     uses cpap    Sleep disorder     Vomiting        Past Surgical History:   Procedure Laterality Date    CARDIAC SURG PROCEDURE UNLIST      cardiac catheterization    COLONOSCOPY N/A 9/30/2016    COL[facility] with [facility]OSCOPY    HX BACK SURGERY      x3    HX COLONOSCOPY      HX ENDOSCOPY      HX HEENT      sinus sx    HX ORTHOPAEDIC      left shoulder, right knee, left thumb, left great toe sx    HX UROLOGICAL      Lithotripsy    DC COLSC FLX W/RMVL OF TUMOR POLYP LESION SNARE TQ N/A 09/30/2016    Dr. Wil Ferrer       Social History   Substance Use Topics    Smoking status: Never Smoker    Smokeless tobacco: Never Used    Alcohol use Yes      Comment: rare use       No Known Allergies    Outpatient Prescriptions Marked as Taking for the 7/20/18 encounter (Office Visit) with Svetlana Contreras MD   Medication Sig Dispense Refill    nortriptyline (PAMELOR) 25 mg capsule Take 1 Cap by mouth nightly. Indications: headaches 90 Cap 3    esomeprazole (NEXIUM) 40 mg capsule Take 1 Cap by mouth daily. Indications: gastroesophageal reflux disease 90 Cap 4    sertraline (ZOLOFT) 50 mg tablet Take 1 Tab by mouth daily. 90 Tab 4    amLODIPine (NORVASC) 2.5 mg tablet TAKE 1 TABLET DAILY 90 Tab 3    isosorbide mononitrate ER (IMDUR) 30 mg tablet Take 1 Tab by mouth daily.  Indications: Chronic Stable Angina Pectoris 90 Tab 3    rosuvastatin (CRESTOR) 10 mg tablet Take 1 Tab by mouth nightly. 90 Tab 4    nitroglycerin (NITROLINGUAL) 400 mcg/spray spray 1 Spray by SubLINGual route every five (5) minutes as needed for Chest Pain for up to 3 doses. 1 Bottle 1    aspirin delayed-release 81 mg tablet Take  by mouth daily.  multivitamin (ONE A DAY) tablet Take 1 Tab by mouth daily.  cpap machine kit by Does Not Apply route. CPAP at 11 cm with humidifier. Mask: Wisp nasal, large. Length of need 99 months. Replace mask and accessories as needed times 12 months. Download data at 30 days and fax at [phone]. Dx- DB on CPAP, RLS, Obesity 1 Kit 0    LUMIGAN 0.01 % ophthalmic drops Administer 1 Drop to both eyes. Visit Vitals    /88    Pulse 93    Ht 5' 9\" (1.753 m)    Wt 113.4 kg (250 lb)    BMI 36.92 kg/m2         Physical Exam   Constitutional: He is oriented to person, place, and time. He appears well-developed and well-nourished. HENT:   Head: Normocephalic and atraumatic. Eyes: Conjunctivae are normal.   Neck: Neck supple. No JVD present. No tracheal deviation present. No thyromegaly present. Cardiovascular: Normal rate, regular rhythm and normal heart sounds. Exam reveals no gallop and no friction rub. No murmur heard. Pulmonary/Chest: Breath sounds normal. No respiratory distress. He has no wheezes. He has no rales. He exhibits no tenderness. Abdominal: Soft. There is no tenderness. Musculoskeletal: He exhibits no edema. Neurological: He is alert and oriented to person, place, and time. Skin: Skin is warm and dry. Psychiatric: He has a normal mood and affect. Mr. Ramesh Cook has a reminder for a \"due or due soon\" health maintenance. I have asked that he contact his primary care provider for follow-up on this health maintenance. Cath-2004  rca spasm-  lcx 30-40%  Stress test-2015  Fixed ant defect  Echo-2015  Normal lvef  ekg-7/2016-  wnl    Assessment         ICD-10-CM ICD-9-CM    1.  Coronary artery disease involving native coronary artery of native heart with angina pectoris with documented spasm (HCC) Y99.702 427.64 METABOLIC PANEL, BASIC     413.9 CBC WITH AUTOMATED DIFF      HEPATIC FUNCTION PANEL      LIPID PANEL    Clinically stable. Occasional episode of chest pain. Stable pattern continue medical management   2. Essential hypertension with goal blood pressure less than 140/90 I10 401.9     Stable. Monitor   3. Hyperlipidemia, unspecified hyperlipidemia type T21.9 147.5 METABOLIC PANEL, BASIC      CBC WITH AUTOMATED DIFF      HEPATIC FUNCTION PANEL      LIPID PANEL    On statin. Check lab   4. DB on CPAP G47.33 327.23     Z99.89 V46.8     Stable   Had tried cardizem in past -had drop in bp  No acei/normal lvef    Medications Discontinued During This Encounter   Medication Reason    amLODIPine (NORVASC) 2.5 mg tablet Duplicate Order       Orders Placed This Encounter    METABOLIC PANEL, BASIC     Standing Status:   Future     Standing Expiration Date:   8/19/2018    CBC WITH AUTOMATED DIFF     Standing Status:   Future     Standing Expiration Date:   8/19/2018    HEPATIC FUNCTION PANEL     Standing Status:   Future     Standing Expiration Date:   1/18/2019    LIPID PANEL     Standing Status:   Future     Standing Expiration Date:   1/18/2019       Follow-up Disposition:  Return in about 6 months (around 1/20/2019).

## 2018-07-20 NOTE — PROGRESS NOTES
1. Have you been to the ER, urgent care clinic since your last visit? Hospitalized since your last visit?     no    2. Have you seen or consulted any other health care providers outside of the [facility] since your last visit? Include any pap smears or colon screening. No     3. Since your last visit, have you had any of the following symptoms?      palpitations.

## 2018-07-20 NOTE — LETTER
Be Michael 1965 7/20/2018 Dear MD Monty Power MD 
 
I had the pleasure of evaluating  Mr. Ya Chandler in office today. Below are the relevant portions of my assessment and plan of care. ICD-10-CM ICD-9-CM 1. Coronary artery disease involving native coronary artery of native heart with angina pectoris with documented spasm (HCC) Q65.984 953.27 METABOLIC PANEL, BASIC  
  413.9 CBC WITH AUTOMATED DIFF  
   HEPATIC FUNCTION PANEL  
   LIPID PANEL Clinically stable. Occasional episode of chest pain. Stable pattern continue medical management 2. Essential hypertension with goal blood pressure less than 140/90 I10 401.9 Stable. Monitor 3. Hyperlipidemia, unspecified hyperlipidemia type V73.7 446.8 METABOLIC PANEL, BASIC  
   CBC WITH AUTOMATED DIFF  
   HEPATIC FUNCTION PANEL  
   LIPID PANEL On statin. Check lab 4. DB on CPAP G47.33 327.23   
 Z99.89 V46.8 Stable Current Outpatient Prescriptions Medication Sig Dispense Refill  nortriptyline (PAMELOR) 25 mg capsule Take 1 Cap by mouth nightly. Indications: headaches 90 Cap 3  
 esomeprazole (NEXIUM) 40 mg capsule Take 1 Cap by mouth daily. Indications: gastroesophageal reflux disease 90 Cap 4  
 sertraline (ZOLOFT) 50 mg tablet Take 1 Tab by mouth daily. 90 Tab 4  
 amLODIPine (NORVASC) 2.5 mg tablet TAKE 1 TABLET DAILY 90 Tab 3  
 isosorbide mononitrate ER (IMDUR) 30 mg tablet Take 1 Tab by mouth daily. Indications: Chronic Stable Angina Pectoris 90 Tab 3  
 rosuvastatin (CRESTOR) 10 mg tablet Take 1 Tab by mouth nightly. 90 Tab 4  
 nitroglycerin (NITROLINGUAL) 400 mcg/spray spray 1 Spray by SubLINGual route every five (5) minutes as needed for Chest Pain for up to 3 doses. 1 Bottle 1  
 aspirin delayed-release 81 mg tablet Take  by mouth daily.  multivitamin (ONE A DAY) tablet Take 1 Tab by mouth daily.  cpap machine kit by Does Not Apply route. CPAP at 11 cm with humidifier. Mask: Wisp nasal, large. Length of need 99 months. Replace mask and accessories as needed times 12 months. Download data at 30 days and fax at [phone]. Dx- DB on CPAP, RLS, Obesity 1 Kit 0  
 LUMIGAN 0.01 % ophthalmic drops Administer 1 Drop to both eyes. Orders Placed This Encounter  METABOLIC PANEL, BASIC Standing Status:   Future Standing Expiration Date:   8/19/2018  CBC WITH AUTOMATED DIFF Standing Status:   Future Standing Expiration Date:   8/19/2018  
 HEPATIC FUNCTION PANEL Standing Status:   Future Standing Expiration Date:   1/18/2019  LIPID PANEL Standing Status:   Future Standing Expiration Date:   1/18/2019 If you have questions, please do not hesitate to call me. I look forward to following Mr. Stephania Marquez along with you. Sincerely, Stoney Hamman, MD

## 2018-07-20 NOTE — MR AVS SNAPSHOT
[address] [address] [address] [facility] 
[phone] Patient: Antwon Cain MRN: [id number] :[id number] Visit Information Date & Time Provider Department Dept. Phone Encounter #  
 2018 11:30 AM Azul Phillips MD [facility] [id number] [phone] Follow-up Instructions Return in about 6 months (around 2019). Upcoming Health Maintenance Date Due DTaP/Tdap/Td series (1 - Tdap) 2012 Influenza Age 5 to Adult 2018 COLONOSCOPY 2021 Allergies as of 2018  Review Complete On: 2018 By: Azul Phillips MD  
 No Known Allergies Current Immunizations  Reviewed on 2016 Name Date Influenza Vaccine (Quad) PF 2017 12:00 AM  
 Td 2012 Not reviewed this visit You Were Diagnosed With   
  
 Codes Comments Coronary artery disease involving native coronary artery of native heart with angina pectoris with documented spasm ([facility] [address]    -  Primary ICD-10-CM: I25.111 ICD-9-CM: 414.01, 413.9 Clinically stable. Occasional episode of chest pain. Stable pattern continue medical management Essential hypertension with goal blood pressure less than 140/90     ICD-10-CM: I10 
ICD-9-CM: 401.9 Stable. Monitor Hyperlipidemia, unspecified hyperlipidemia type     ICD-10-CM: E78.5 ICD-9-CM: 272.4 On statin. Check lab DB on CPAP     ICD-10-CM: G47.33, Z99.89 ICD-9-CM: 327.23, V46.8 Stable Vitals BP Pulse Height(growth percentile) Weight(growth percentile) BMI Smoking Status 143/88 93 5' 9\" (1.753 m) 250 lb (113.4 kg) 36.92 kg/m2 Never Smoker Vitals History BMI and BSA Data Body Mass Index Body Surface Area  
 36.92 kg/m 2 2.35 m 2 Preferred Pharmacy Pharmacy Name Phone [id number] Yeager, [address] [facility] [facility] [phone] Your Updated Medication List  
  
   
 This list is accurate as of 7/20/18 11:55 AM.  Always use your most recent med list. amLODIPine 2.5 mg tablet Commonly known as:  Rigoberto Wanda TAKE 1 TABLET DAILY  
  
 aspirin delayed-release 81 mg tablet Take  by mouth daily. cpap machine kit  
by Does Not Apply route. CPAP at 11 cm with humidifier. Mask: Wisp nasal, large. Length of need 99 months. Replace mask and accessories as needed times 12 months. Download data at 30 days and fax at [phone]. Dx- DB on CPAP, RLS, Obesity  
  
 esomeprazole 40 mg capsule Commonly known as:  Loletta William Take 1 Cap by mouth daily. Indications: gastroesophageal reflux disease  
  
 isosorbide mononitrate ER 30 mg tablet Commonly known as:  IMDUR Take 1 Tab by mouth daily. Indications: Chronic Stable Angina Pectoris LUMIGAN 0.01 % ophthalmic drops Generic drug:  bimatoprost  
Administer 1 Drop to both eyes. multivitamin tablet Commonly known as:  ONE A DAY Take 1 Tab by mouth daily. nitroglycerin 400 mcg/spray spray Commonly known as:  NITROLINGUAL  
1 [place] by SubLINGual route every five (5) minutes as needed for Chest Pain for up to 3 doses. nortriptyline 25 mg capsule Commonly known as:  PAMELOR Take 1 Cap by mouth nightly. Indications: headaches  
  
 rosuvastatin 10 mg tablet Commonly known as:  CRESTOR Take 1 Tab by mouth nightly. sertraline 50 mg tablet Commonly known as:  ZOLOFT Take 1 Tab by mouth daily. Follow-up Instructions Return in about 6 months (around 1/20/2019). To-Do List   
 07/20/2018 Lab:  HEPATIC FUNCTION PANEL   
  
 07/20/2018 Lab:  LIPID PANEL   
  
 07/27/2018 Lab:  CBC WITH AUTOMATED DIFF   
  
 07/27/2018 Lab:  METABOLIC PANEL, BASIC Introducing [facility]!    
 Dear Chasity Moffett: 
 Thank you for requesting a Snippets account. Our records indicate that you already have an active Snippets account. You can access your account anytime at [email or web address] Did you know that you can access your hospital and ER discharge instructions at any time in Snippets? You can also review all of your test results from your hospital stay or ER visit. Additional Information If you have questions, please visit the Frequently Asked Questions section of the Snippets website at [email or web address] Remember, Snippets is NOT to be used for urgent needs. For medical emergencies, dial 911. Now available from your iPhone and Android! Please provide this summary of care documentation to your next provider. Your primary care clinician is listed as Saul Correa. If you have any questions after today's visit, please call [phone].

## 2018-07-24 RX ORDER — NITROGLYCERIN 400 UG/1
1 SPRAY ORAL
Qty: 1 BOTTLE | Refills: 1 | Status: SHIPPED | OUTPATIENT
Start: 2018-07-24 | End: 2020-10-21 | Stop reason: SDUPTHER

## 2018-07-27 ENCOUNTER — HOSPITAL ENCOUNTER (OUTPATIENT)
Dept: LAB | Age: 53
Discharge: HOME | End: 2018-07-27

## 2018-07-27 PROCEDURE — 99001 SPECIMEN HANDLING PT-LAB: CPT | Performed by: INTERNAL MEDICINE

## 2018-07-28 LAB
ALBUMIN SERPL-MCNC: 4.5 G/DL (ref 3.5–5.5)
ALP SERPL-CCNC: 90 IU/L (ref 39–117)
ALT SERPL-CCNC: 36 IU/L (ref 0–44)
AST SERPL-CCNC: 27 IU/L (ref 0–40)
BASOPHILS # BLD AUTO: 0 X10E3/UL (ref 0–0.2)
BASOPHILS NFR BLD AUTO: 0 %
BILIRUB DIRECT SERPL-MCNC: 0.04 MG/DL (ref 0–0.4)
BILIRUB SERPL-MCNC: <0.2 MG/DL (ref 0–1.2)
BUN SERPL-MCNC: 11 MG/DL (ref 6–24)
BUN/CREAT SERPL: 11 (ref 9–20)
CALCIUM SERPL-MCNC: 9.3 MG/DL (ref 8.7–10.2)
CHLORIDE SERPL-SCNC: 105 MMOL/L (ref 96–106)
CHOLEST SERPL-MCNC: 145 MG/DL (ref 100–199)
CO2 SERPL-SCNC: 24 MMOL/L (ref 20–29)
CREAT SERPL-MCNC: 1 MG/DL (ref 0.76–1.27)
EOSINOPHIL # BLD AUTO: 0.2 X10E3/UL (ref 0–0.4)
EOSINOPHIL NFR BLD AUTO: 3 %
ERYTHROCYTE [DISTWIDTH] IN BLOOD BY AUTOMATED COUNT: 15.5 % (ref 12.3–15.4)
GLUCOSE SERPL-MCNC: 89 MG/DL (ref 65–99)
HCT VFR BLD AUTO: 42.6 % (ref 37.5–51)
HDLC SERPL-MCNC: 41 MG/DL
HGB BLD-MCNC: 14.5 G/DL (ref 13–17.7)
IMM GRANULOCYTES # BLD: 0 X10E3/UL (ref 0–0.1)
IMM GRANULOCYTES NFR BLD: 0 %
LDLC SERPL CALC-MCNC: 87 MG/DL (ref 0–99)
LYMPHOCYTES # BLD AUTO: 2.9 X10E3/UL (ref 0.7–3.1)
LYMPHOCYTES NFR BLD AUTO: 36 %
MCH RBC QN AUTO: 29.1 PG (ref 26.6–33)
MCHC RBC AUTO-ENTMCNC: 34 G/DL (ref 31.5–35.7)
MCV RBC AUTO: 86 FL (ref 79–97)
MONOCYTES # BLD AUTO: 0.5 X10E3/UL (ref 0.1–0.9)
MONOCYTES NFR BLD AUTO: 6 %
NEUTROPHILS # BLD AUTO: 4.3 X10E3/UL (ref 1.4–7)
NEUTROPHILS NFR BLD AUTO: 55 %
PLATELET # BLD AUTO: 234 X10E3/UL (ref 150–379)
POTASSIUM SERPL-SCNC: 4.9 MMOL/L (ref 3.5–5.2)
PROT SERPL-MCNC: 7.2 G/DL (ref 6–8.5)
RBC # BLD AUTO: 4.98 X10E6/UL (ref 4.14–5.8)
SODIUM SERPL-SCNC: 143 MMOL/L (ref 134–144)
TRIGL SERPL-MCNC: 84 MG/DL (ref 0–149)
VLDLC SERPL CALC-MCNC: 17 MG/DL (ref 5–40)
WBC # BLD AUTO: 7.9 X10E3/UL (ref 3.4–10.8)

## 2018-08-28 ENCOUNTER — OFFICE VISIT (OUTPATIENT)
Dept: FAMILY MEDICINE CLINIC | Facility: CLINIC | Age: 53
End: 2018-08-28

## 2018-08-28 VITALS
OXYGEN SATURATION: 94 % | HEIGHT: 69 IN | SYSTOLIC BLOOD PRESSURE: 155 MMHG | TEMPERATURE: 98.4 F | WEIGHT: 245 LBS | DIASTOLIC BLOOD PRESSURE: 87 MMHG | HEART RATE: 101 BPM | RESPIRATION RATE: 18 BRPM | BODY MASS INDEX: 36.29 KG/M2

## 2018-08-28 DIAGNOSIS — J06.9 VIRAL URI WITH COUGH: Primary | ICD-10-CM

## 2018-08-28 RX ORDER — BENZONATATE 100 MG/1
100 CAPSULE ORAL
Qty: 40 CAP | Refills: 2 | Status: SHIPPED | OUTPATIENT
Start: 2018-08-28 | End: 2018-09-11 | Stop reason: SDUPTHER

## 2018-08-28 RX ORDER — CODEINE PHOSPHATE AND GUAIFENESIN 10; 100 MG/5ML; MG/5ML
5 SOLUTION ORAL
Qty: 118 ML | Refills: 0 | Status: SHIPPED | OUTPATIENT
Start: 2018-08-28 | End: 2018-09-11 | Stop reason: SDUPTHER

## 2018-08-28 NOTE — PROGRESS NOTES
HISTORY OF PRESENT ILLNESS  Tyra Lopez is a 52 y.o. male. HPI Comments: Presents with one week of sore throat, headaches, cough, sweats (no fever noted), malaise, some lightheadedness. No N/V, nasal congestion, myalgias. He was seen at [facility] 4 days ago (negative strep), and was advised to use Coricidin, fluids, Tylenol and rest. He is still having significant cough and hoarseness, with only occasional sore throat. No ill contacts. Hospital Follow Up   Pertinent negatives include no chest pain and no shortness of breath. Past Medical History:   Diagnosis Date    Back pain     CAD (coronary artery disease)     Chest pain     Coronary artery spasm (HCC)     GERD (gastroesophageal reflux disease)     Headache     migraines    Hypertension     Kidney disease     Kidney stone     Nausea     Sleep apnea     Sleep apnea     uses cpap    Sleep disorder     Vomiting        Past Surgical History:   Procedure Laterality Date    CARDIAC SURG PROCEDURE UNLIST      cardiac catheterization    COLONOSCOPY N/A 9/30/2016    COLONOSCOPY with biopsy performed by Yamilka [facility]Lguna MD at [facility] ENDOSCOPY    HX BACK SURGERY      x3    HX COLONOSCOPY      HX ENDOSCOPY      HX HEENT      sinus sx    HX ORTHOPAEDIC      left shoulder, right knee, left thumb, left great toe sx    HX UROLOGICAL      Lithotripsy    MA COLSC FLX W/RMVL OF TUMOR POLYP LESION SNARE TQ N/A 09/30/2016    Dr. Jyotsna Quezada       History   Smoking Status    Never Smoker   Smokeless Tobacco    Never Used     Current Outpatient Prescriptions   Medication Sig    nitroglycerin (NITROLINGUAL) 400 mcg/spray spray 1 Spray by SubLINGual route every five (5) minutes as needed for Chest Pain for up to 3 doses.  nortriptyline (PAMELOR) 25 mg capsule Take 1 Cap by mouth nightly. Indications: headaches    esomeprazole (NEXIUM) 40 mg capsule Take 1 Cap by mouth daily.  Indications: gastroesophageal reflux disease    sertraline (ZOLOFT) 50 mg tablet Take 1 Tab by mouth daily.  amLODIPine (NORVASC) 2.5 mg tablet TAKE 1 TABLET DAILY    isosorbide mononitrate ER (IMDUR) 30 mg tablet Take 1 Tab by mouth daily. Indications: Chronic Stable Angina Pectoris    rosuvastatin (CRESTOR) 10 mg tablet Take 1 Tab by mouth nightly.  aspirin delayed-release 81 mg tablet Take  by mouth daily.  multivitamin (ONE A DAY) tablet Take 1 Tab by mouth daily.  cpap machine kit by Does Not Apply route. CPAP at 11 cm with humidifier. Mask: Wisp nasal, large. Length of need 99 months. Replace mask and accessories as needed times 12 months. Download data at 30 days and fax at [phone]. Dx- DB on CPAP, RLS, Obesity    LUMIGAN 0.01 % ophthalmic drops Administer 1 Drop to both eyes. No current facility-administered medications for this visit. Review of Systems   Constitutional: Positive for diaphoresis. Negative for chills and fever. HENT: Positive for ear pain (congestion) and sore throat. Negative for congestion. Respiratory: Positive for cough. Negative for shortness of breath. Cardiovascular: Negative for chest pain. Gastrointestinal: Negative for nausea and vomiting. Musculoskeletal: Negative for myalgias. Neurological: Positive for dizziness. Visit Vitals    /87    Pulse (!) 101    Temp 98.4 °F (36.9 °C) (Oral)    Resp 18    Ht 5' 9\" (1.753 m)    Wt 245 lb (111.1 kg)    SpO2 94%    BMI 36.18 kg/m2       Physical Exam   Constitutional: He is oriented to person, place, and time. He appears well-developed and well-nourished. No distress. HENT:   Right Ear: Tympanic membrane, external ear and ear canal normal.   Left Ear: Tympanic membrane, external ear and ear canal normal.   Nose: Mucosal edema present. Mouth/Throat: Oropharynx is clear and moist.   Neck: Neck supple. No thyromegaly present. Cardiovascular: Normal rate and regular rhythm. Exam reveals no gallop and no friction rub.     No murmur heard.  Pulmonary/Chest: Effort normal and breath sounds normal. No respiratory distress. Lymphadenopathy:     He has no cervical adenopathy. Neurological: He is alert and oriented to person, place, and time. Skin: Skin is warm and dry. Psychiatric: He has a normal mood and affect. His behavior is normal. Judgment and thought content normal.       ASSESSMENT and PLAN    ICD-10-CM ICD-9-CM    1. Viral URI with cough J06.9 465.9 guaiFENesin-codeine (ROBITUSSIN AC) 100-10 mg/5 mL solution    B97.89  benzonatate (TESSALON) 100 mg capsule     Follow-up Disposition:  Return if symptoms worsen or fail to improve. the following changes in treatment are made: Robitussin AC, Tessalon perles for cough. Push fluids, Tylenol prn. May add Mucinex if needed. reviewed medications and side effects in detail  Plan of care reviewed - patient verbalize(s) understanding and agreement.

## 2018-08-28 NOTE — PROGRESS NOTES
Chief Complaint   Patient presents with   [facility] Follow Up     [facility]/2018 sore throat headache

## 2018-09-11 ENCOUNTER — OFFICE VISIT (OUTPATIENT)
Dept: FAMILY MEDICINE CLINIC | Facility: CLINIC | Age: 53
End: 2018-09-11

## 2018-09-11 VITALS
RESPIRATION RATE: 18 BRPM | TEMPERATURE: 97.4 F | SYSTOLIC BLOOD PRESSURE: 145 MMHG | HEIGHT: 69 IN | HEART RATE: 104 BPM | WEIGHT: 247 LBS | DIASTOLIC BLOOD PRESSURE: 88 MMHG | BODY MASS INDEX: 36.58 KG/M2 | OXYGEN SATURATION: 97 %

## 2018-09-11 DIAGNOSIS — J40 BRONCHITIS: Primary | ICD-10-CM

## 2018-09-11 RX ORDER — PREDNISONE 20 MG/1
40 TABLET ORAL
Qty: 14 TAB | Refills: 0 | Status: SHIPPED | OUTPATIENT
Start: 2018-09-11 | End: 2018-09-18

## 2018-09-11 RX ORDER — CODEINE PHOSPHATE AND GUAIFENESIN 10; 100 MG/5ML; MG/5ML
5 SOLUTION ORAL
Qty: 118 ML | Refills: 0 | Status: SHIPPED | OUTPATIENT
Start: 2018-09-11 | End: 2019-05-20

## 2018-09-11 RX ORDER — AZITHROMYCIN 250 MG/1
TABLET, FILM COATED ORAL
Qty: 6 TAB | Refills: 0 | Status: SHIPPED | OUTPATIENT
Start: 2018-09-11 | End: 2019-01-31

## 2018-09-11 RX ORDER — BENZONATATE 100 MG/1
100 CAPSULE ORAL
Qty: 40 CAP | Refills: 2 | Status: SHIPPED | OUTPATIENT
Start: 2018-09-11 | End: 2019-01-31

## 2018-09-11 NOTE — PROGRESS NOTES
HISTORY OF PRESENT ILLNESS  Nixon Ruff is a 52 y.o. male. HPI Comments: Seen in follow-up after his visit 2 weeks ago for URI with cough. He says that his cough is worse, with yellowish sputum and occasional blood. He still has ear pressure and nasal congestion, no fever. Cough   Pertinent negatives include no chest pain. Nasal Congestion    Associated symptoms include congestion, ear pain and cough. Pertinent negatives include no chills, no sore throat and no chest pain. Past Medical History:   Diagnosis Date    Back pain     CAD (coronary artery disease)     Chest pain     Coronary artery spasm (HCC)     GERD (gastroesophageal reflux disease)     Headache     migraines    Hypertension     Kidney disease     Kidney stone     Nausea     Sleep apnea     Sleep apnea     uses cpap    Sleep disorder     Vomiting        Past Surgical History:   Procedure Laterality Date    CARDIAC SURG PROCEDURE UNLIST      cardiac catheterization    COLONOSCOPY N/A 9/30/2016    COLONOSCOPY with biopsy performed by Lisa Linares MD at [facility] ENDOSCOPY    HX BACK SURGERY      x3    HX COLONOSCOPY      HX ENDOSCOPY      HX HEENT      sinus sx    HX ORTHOPAEDIC      left shoulder, right knee, left thumb, left great toe sx    HX UROLOGICAL      Lithotripsy    MA COLSC FLX W/RMVL OF TUMOR POLYP LESION SNARE TQ N/A 09/30/2016    Dr. Ivan Christiansen       History   Smoking Status    Never Smoker   Smokeless Tobacco    Never Used     Current Outpatient Prescriptions   Medication Sig    guaiFENesin-codeine (ROBITUSSIN AC) 100-10 mg/5 mL solution Take 5 mL by mouth four (4) times daily as needed for Cough. Max Daily Amount: 20 mL.  azithromycin (ZITHROMAX) 250 mg tablet Take 2 tablets today, then take 1 tablet daily    benzonatate (TESSALON) 100 mg capsule Take 1 Cap by mouth three (3) times daily as needed for Cough.  Indications: Cough    predniSONE (DELTASONE) 20 mg tablet Take 2 Tabs by mouth daily (with breakfast) for 7 days.  nitroglycerin (NITROLINGUAL) 400 mcg/spray spray 1 Spray by SubLINGual route every five (5) minutes as needed for Chest Pain for up to 3 doses.  nortriptyline (PAMELOR) 25 mg capsule Take 1 Cap by mouth nightly. Indications: headaches    esomeprazole (NEXIUM) 40 mg capsule Take 1 Cap by mouth daily. Indications: gastroesophageal reflux disease    sertraline (ZOLOFT) 50 mg tablet Take 1 Tab by mouth daily.  amLODIPine (NORVASC) 2.5 mg tablet TAKE 1 TABLET DAILY    isosorbide mononitrate ER (IMDUR) 30 mg tablet Take 1 Tab by mouth daily. Indications: Chronic Stable Angina Pectoris    rosuvastatin (CRESTOR) 10 mg tablet Take 1 Tab by mouth nightly.  aspirin delayed-release 81 mg tablet Take  by mouth daily.  multivitamin (ONE A DAY) tablet Take 1 Tab by mouth daily.  cpap machine kit by Does Not Apply route. CPAP at 11 cm with humidifier. Mask: Wisp nasal, large. Length of need 99 months. Replace mask and accessories as needed times 12 months. Download data at 30 days and fax at [phone]. Dx- DB on CPAP, RLS, Obesity    LUMIGAN 0.01 % ophthalmic drops Administer 1 Drop to both eyes. No current facility-administered medications for this visit. Review of Systems   Constitutional: Negative for chills and fever. HENT: Positive for congestion and ear pain. Negative for sore throat. Respiratory: Positive for cough and wheezing. Cardiovascular: Negative for chest pain. Gastrointestinal: Negative for nausea and vomiting. Musculoskeletal: Negative for myalgias. Neurological: Positive for dizziness (lightheadedness). Visit Vitals    /88    Pulse (!) 104    Temp 97.4 °F (36.3 °C) (Oral)    Resp 18    Ht 5' 9\" (1.753 m)    Wt 247 lb (112 kg)    SpO2 97%    BMI 36.48 kg/m2       Physical Exam   Constitutional: He is oriented to person, place, and time. He appears well-developed and well-nourished. No distress.    HENT:   Right Ear: Tympanic membrane, external ear and ear canal normal.   Left Ear: Tympanic membrane, external ear and ear canal normal.   Nose: Mucosal edema present. Mouth/Throat: Oropharynx is clear and moist.   Neck: Neck supple. No thyromegaly present. Cardiovascular: Normal rate and regular rhythm. Exam reveals no gallop and no friction rub. No murmur heard. Pulmonary/Chest: Effort normal and breath sounds normal. No respiratory distress. He has no wheezes. He has no rales. Lymphadenopathy:     He has no cervical adenopathy. Neurological: He is alert and oriented to person, place, and time. Skin: Skin is warm and dry. Psychiatric: He has a normal mood and affect. His behavior is normal. Judgment and thought content normal.       ASSESSMENT and PLAN    ICD-10-CM ICD-9-CM    1. Bronchitis J40 490 guaiFENesin-codeine (ROBITUSSIN AC) 100-10 mg/5 mL solution      azithromycin (ZITHROMAX) 250 mg tablet      benzonatate (TESSALON) 100 mg capsule      predniSONE (DELTASONE) 20 mg tablet     Follow-up Disposition:  Return if symptoms worsen or fail to improve. the following changes in treatment are made: Zithromax, steroid pulse, cough medication for bronchitis. reviewed medications and side effects in detail  Plan of care reviewed - patient verbalize(s) understanding and agreement.

## 2018-09-11 NOTE — MR AVS SNAPSHOT
[address] [place] [address] 
[phone] Patient: Namita Rosas MRN: [id number] :1965 Visit Information Date & Time Provider Department Dept. Phone Encounter #  
 2018  2:00 PM Laura Us MD [facility] [phone] Follow-up Instructions Return if symptoms worsen or fail to improve. Upcoming Health Maintenance Date Due DTaP/Tdap/Td series (1 - Tdap) 2012 Influenza Age 5 to Adult 2018 COLONOSCOPY 2021 Allergies as of 2018  Review Complete On: 2018 By: Laura Us MD  
 No Known Allergies Current Immunizations  Reviewed on 2016 Name Date Influenza Vaccine (Quad) PF 2017 12:00 AM  
 Td 2012 Not reviewed this visit You Were Diagnosed With   
  
 Codes Comments Bronchitis    -  Primary ICD-10-CM: R25 ICD-9-CM: 167 Vitals BP Pulse Temp Resp Height(growth percentile) Weight(growth percentile) 145/88 (!) 104 97.4 °F (36.3 °C) (Oral) 18 5' 9\" (1.753 m) 247 lb (112 kg) SpO2 BMI Smoking Status 97% 36.48 kg/m2 Never Smoker Vitals History BMI and BSA Data Body Mass Index Body Surface Area  
 36.48 kg/m 2 2.34 m 2 Preferred Pharmacy Pharmacy Name Phone Monica Mcnally, [facility] [phone] Your Updated Medication List  
  
   
This list is accurate as of 18  3:15 PM.  Always use your most recent med list. amLODIPine 2.5 mg tablet Commonly known as:  Tracy Cole TAKE 1 TABLET DAILY  
  
 aspirin delayed-release 81 mg tablet Take  by mouth daily. azithromycin 250 mg tablet Commonly known as:  Lyssa Vu Take 2 tablets today, then take 1 tablet daily  
  
 benzonatate 100 mg capsule Commonly known as:  TESSALON  
 Take 1 Cap by mouth three (3) times daily as needed for Cough. Indications: Cough  
  
 cpap machine kit  
by Does Not Apply route. CPAP at 11 cm with humidifier. Mask: Wisp nasal, large. Length of need 99 months. Replace mask and accessories as needed times 12 months. Download data at 30 days and fax at [phone]. Dx- DB on CPAP, RLS, Obesity  
  
 esomeprazole 40 mg capsule Commonly known as:  Kavita Legato Take 1 Cap by mouth daily. Indications: gastroesophageal reflux disease  
  
 guaiFENesin-codeine 100-10 mg/5 mL solution Commonly known as:  ROBITUSSIN AC Take 5 mL by mouth four (4) times daily as needed for Cough. Max Daily Amount: 20 mL. isosorbide mononitrate ER 30 mg tablet Commonly known as:  IMDUR Take 1 Tab by mouth daily. Indications: Chronic Stable Angina Pectoris LUMIGAN 0.01 % ophthalmic drops Generic drug:  bimatoprost  
Administer 1 Drop to both eyes. multivitamin tablet Commonly known as:  ONE A DAY Take 1 Tab by mouth daily. nitroglycerin 400 mcg/spray spray Commonly known as:  NITROLINGUAL  
1 [place] by SubLINGual route every five (5) minutes as needed for Chest Pain for up to 3 doses. nortriptyline 25 mg capsule Commonly known as:  PAMELOR Take 1 Cap by mouth nightly. Indications: headaches  
  
 predniSONE 20 mg tablet Commonly known as:  Merle Tyler Take 2 Tabs by mouth daily (with breakfast) for 7 days. rosuvastatin 10 mg tablet Commonly known as:  CRESTOR Take 1 Tab by mouth nightly. sertraline 50 mg tablet Commonly known as:  ZOLOFT Take 1 Tab by mouth daily. Prescriptions Printed Refills  
 guaiFENesin-codeine (ROBITUSSIN AC) 100-10 mg/5 mL solution 0 Sig: Take 5 mL by mouth four (4) times daily as needed for Cough. Max Daily Amount: 20 mL. Class: Print Route: Oral  
 azithromycin (ZITHROMAX) 250 mg tablet 0 Sig: Take 2 tablets today, then take 1 tablet daily Class: Print benzonatate (TESSALON) 100 mg capsule 2 Sig: Take 1 Cap by mouth three (3) times daily as needed for Cough. Indications: Cough Class: Print Route: Oral  
 predniSONE (DELTASONE) 20 mg tablet 0 Sig: Take 2 Tabs by mouth daily (with breakfast) for 7 days. Class: Print Route: Oral  
  
Follow-up Instructions Return if symptoms worsen or fail to improve. Introducing [facility]! Dear Sera Inman: Thank you for requesting a [employer] account. Our records indicate that you already have an active [employer] account. You can access your account anytime at [email or web address] Did you know that you can access your hospital and ER discharge instructions at any time in [employer]? You can also review all of your test results from your hospital stay or ER visit. Additional Information If you have questions, please visit the Frequently Asked Questions section of the [employer] website at [email or web address] Remember, [employer] is NOT to be used for urgent needs. For medical emergencies, dial 911. Now available from your iPhone and Android! Please provide this summary of care documentation to your next provider. Your primary care clinician is listed as Lynn Judd. If you have any questions after today's visit, please call [phone].

## 2018-09-17 RX ORDER — ROSUVASTATIN CALCIUM 10 MG/1
TABLET, COATED ORAL
Qty: 90 TAB | Refills: 4 | Status: SHIPPED | OUTPATIENT
Start: 2018-09-17 | End: 2019-11-01 | Stop reason: SDUPTHER

## 2018-10-28 DIAGNOSIS — I25.111 CORONARY ARTERY DISEASE INVOLVING NATIVE CORONARY ARTERY OF NATIVE HEART WITH ANGINA PECTORIS WITH DOCUMENTED SPASM (HCC): ICD-10-CM

## 2018-10-29 RX ORDER — ISOSORBIDE MONONITRATE 30 MG/1
TABLET, EXTENDED RELEASE ORAL
Qty: 90 TAB | Refills: 3 | Status: SHIPPED | OUTPATIENT
Start: 2018-10-29 | End: 2019-01-31

## 2019-01-31 ENCOUNTER — OFFICE VISIT (OUTPATIENT)
Dept: CARDIOLOGY CLINIC | Age: 54
End: 2019-01-31

## 2019-01-31 ENCOUNTER — TELEPHONE (OUTPATIENT)
Dept: FAMILY MEDICINE CLINIC | Facility: CLINIC | Age: 54
End: 2019-01-31

## 2019-01-31 VITALS
SYSTOLIC BLOOD PRESSURE: 142 MMHG | HEART RATE: 101 BPM | DIASTOLIC BLOOD PRESSURE: 88 MMHG | HEIGHT: 69 IN | WEIGHT: 251 LBS | BODY MASS INDEX: 37.18 KG/M2

## 2019-01-31 DIAGNOSIS — G47.33 OSA ON CPAP: ICD-10-CM

## 2019-01-31 DIAGNOSIS — I25.111 CORONARY ARTERY DISEASE INVOLVING NATIVE CORONARY ARTERY OF NATIVE HEART WITH ANGINA PECTORIS WITH DOCUMENTED SPASM (HCC): ICD-10-CM

## 2019-01-31 DIAGNOSIS — Z99.89 OSA ON CPAP: ICD-10-CM

## 2019-01-31 DIAGNOSIS — I25.111 CORONARY ARTERY DISEASE INVOLVING NATIVE CORONARY ARTERY OF NATIVE HEART WITH ANGINA PECTORIS WITH DOCUMENTED SPASM (HCC): Primary | ICD-10-CM

## 2019-01-31 DIAGNOSIS — I10 ESSENTIAL HYPERTENSION WITH GOAL BLOOD PRESSURE LESS THAN 140/90: ICD-10-CM

## 2019-01-31 DIAGNOSIS — E78.5 HYPERLIPIDEMIA, UNSPECIFIED HYPERLIPIDEMIA TYPE: ICD-10-CM

## 2019-01-31 RX ORDER — ISOSORBIDE MONONITRATE 60 MG/1
60 TABLET, EXTENDED RELEASE ORAL
Qty: 90 TAB | Refills: 1 | Status: SHIPPED | OUTPATIENT
Start: 2019-01-31 | End: 2019-08-16 | Stop reason: SDUPTHER

## 2019-01-31 RX ORDER — AMLODIPINE BESYLATE 5 MG/1
5 TABLET ORAL DAILY
Qty: 90 TAB | Refills: 1 | Status: SHIPPED | OUTPATIENT
Start: 2019-01-31 | End: 2019-04-05 | Stop reason: SDUPTHER

## 2019-01-31 NOTE — PATIENT INSTRUCTIONS
Learning About Coronary Artery Disease (CAD)  What is coronary artery disease? Coronary artery disease (CAD) occurs when plaque builds up in the arteries that bring oxygen-rich blood to your heart. Plaque is a fatty substance made of cholesterol, calcium, and other substances in the blood. This process is called hardening of the arteries, or atherosclerosis. What happens when you have coronary artery disease? · Plaque may narrow the coronary arteries. Narrowed arteries cause poor blood flow. This can lead to angina symptoms such as chest pain or discomfort. If blood flow is completely blocked, you could have a heart attack. · You can slow CAD and reduce the risk of future problems by making changes in your lifestyle. These include quitting smoking and eating heart-healthy foods. · Treatments for CAD, along with changes in your lifestyle, can help you live a longer and healthier life. How can you prevent coronary artery disease? · Do not smoke. It may be the best thing you can do to prevent heart disease. If you need help quitting, talk to your doctor about stop-smoking programs and medicines. These can increase your chances of quitting for good. · Be active. Get at least 30 minutes of exercise on most days of the week. Walking is a good choice. You also may want to do other activities, such as running, swimming, cycling, or playing tennis or team sports. · Eat heart-healthy foods. Eat more fruits and vegetables and less foods that contain saturated and trans fats. Limit alcohol, sodium, and sweets. · Stay at a healthy weight. Lose weight if you need to. · Manage other health problems such as diabetes, high blood pressure, and high cholesterol. · Manage stress. Stress can hurt your heart. To keep stress low, talk about your problems and feelings. Don't keep your feelings hidden. · If you have talked about it with your doctor, take a low-dose aspirin every day.  Aspirin can help certain people lower their risk of a heart attack or stroke. But taking aspirin isn't right for everyone, because it can cause serious bleeding. Do not start taking daily aspirin unless your doctor knows about it. How is coronary artery disease treated? · Your doctor will suggest that you make lifestyle changes. For example, your doctor may ask you to eat healthy foods, quit smoking, lose extra weight, and be more active. · You will have to take medicines. · Your doctor may suggest a procedure to open narrowed or blocked arteries. This is called angioplasty. Or your doctor may suggest using healthy blood vessels to create detours around narrowed or blocked arteries. This is called bypass surgery. Follow-up care is a key part of your treatment and safety. Be sure to make and go to all appointments, and call your doctor if you are having problems. It's also a good idea to know your test results and keep a list of the medicines you take. Where can you learn more? Go to http://sandra-ramana.info/. Enter [phone] in the search box to learn more about \"Learning About Coronary Artery Disease (CAD). \"  Current as of: July 22, 2018  Content Version: 11.9  © 3781-1708 [employer]. Care instructions adapted under license by [employer] (which disclaims liability or warranty for this information). If you have questions about a medical condition or this instruction, always ask your healthcare professional. [place] [address] any warranty or liability for your use of this information.

## 2019-01-31 NOTE — TELEPHONE ENCOUNTER
Faxed over order to [address] [phone] for patient CPAP supplies where patient received supplies from previously with confirmation. Patient made aware.

## 2019-01-31 NOTE — PROGRESS NOTES
1. Have you been to the ER, urgent care clinic since your last visit? Hospitalized since your last visit? No     2. Have you seen or consulted any other health care providers outside of the [address] [place] [address] since your last visit? Include any pap smears or colon screening. Yes Where: [facility]     3. Since your last visit, have you had any of the following symptoms? .         4. Have you had any blood work, X-rays or cardiac testing? 5. Where do you normally have your labs drawn? 6. Do you need any refills today?

## 2019-01-31 NOTE — LETTER
Cee Sergio 1965 1/31/2019 Dear MD Gideon Gaffney MD 
 
I had the pleasure of evaluating  Mr. Osiris Keys in office today. Below are the relevant portions of my assessment and plan of care. ICD-10-CM ICD-9-CM 1. Coronary artery disease involving native coronary artery of native heart with angina pectoris with documented spasm (HCC) I25.111 414.01 isosorbide mononitrate ER (IMDUR) 60 mg CR tablet 413.9   
 recent increase in pain at rest  
adjust meds 2. Essential hypertension with goal blood pressure less than 140/90 I10 401.9 Stable 3. Hyperlipidemia, unspecified hyperlipidemia type E78.5 272.4 Continue statinlabs reviewed 7/2018 LDL controlled 4. DB on CPAP G47.33 327.23   
 Z99.89 V46.8 Continue treatment 5. Coronary artery disease involving native coronary artery of native heart with angina pectoris with documented spasm (HCC) I25.111 414.01 isosorbide mononitrate ER (IMDUR) 60 mg CR tablet 413.9 Current Outpatient Medications Medication Sig Dispense Refill  isosorbide mononitrate ER (IMDUR) 60 mg CR tablet Take 1 Tab by mouth every morning. 90 Tab 1  
 amLODIPine (NORVASC) 5 mg tablet Take 1 Tab by mouth daily. 90 Tab 1  
 rosuvastatin (CRESTOR) 10 mg tablet TAKE 1 TABLET NIGHTLY 90 Tab 4  
 guaiFENesin-codeine (ROBITUSSIN AC) 100-10 mg/5 mL solution Take 5 mL by mouth four (4) times daily as needed for Cough. Max Daily Amount: 20 mL. 118 mL 0  
 nitroglycerin (NITROLINGUAL) 400 mcg/spray spray 1 Spray by SubLINGual route every five (5) minutes as needed for Chest Pain for up to 3 doses. 1 Bottle 1  
 nortriptyline (PAMELOR) 25 mg capsule Take 1 Cap by mouth nightly. Indications: headaches 90 Cap 3  
 esomeprazole (NEXIUM) 40 mg capsule Take 1 Cap by mouth daily. Indications: gastroesophageal reflux disease 90 Cap 4  
 sertraline (ZOLOFT) 50 mg tablet Take 1 Tab by mouth daily. 90 Tab 4  aspirin delayed-release 81 mg tablet Take  by mouth daily.  multivitamin (ONE A DAY) tablet Take 1 Tab by mouth daily.  cpap machine kit by Does Not Apply route. CPAP at 11 cm with humidifier. Mask: Wisp nasal, large. Length of need 99 months. Replace mask and accessories as needed times 12 months. Download data at 30 days and fax at [phone]. Dx- DB on CPAP, RLS, Obesity 1 Kit 0  
 LUMIGAN 0.01 % ophthalmic drops Administer 1 Drop to both eyes. Orders Placed This Encounter  isosorbide mononitrate ER (IMDUR) 60 mg CR tablet Sig: Take 1 Tab by mouth every morning. Dispense:  90 Tab Refill:  1  
 amLODIPine (NORVASC) 5 mg tablet Sig: Take 1 Tab by mouth daily. Dispense:  90 Tab Refill:  1 If you have questions, please do not hesitate to call me. I look forward to following Mr. Indu Mar along with you. Sincerely, Catrachito Edouard MD

## 2019-01-31 NOTE — PROGRESS NOTES
HISTORY OF PRESENT ILLNESS  Desean Rodriguez is a 52 y.o. male. Patient with cad,coronary spasm,htn  On follow up patient denies any sob, palpitation or other significant symptoms. Hypertension   The history is provided by the patient. This is a chronic problem. The problem occurs constantly. The problem has not changed since onset. Associated symptoms include chest pain. Cholesterol Problem   The history is provided by the patient. This is a chronic problem. The problem occurs constantly. The problem has not changed since onset. Associated symptoms include chest pain. Chest Pain (Angina)    The history is provided by the patient. This is a recurrent problem. The problem has been gradually worsening. The problem occurs every several days. The pain is associated with exertion and rest. The pain is present in the substernal region. The pain is mild. The quality of the pain is described as pressure-like. The pain does not radiate. Pertinent negatives include no claudication, no cough, no dizziness, no fever, no hemoptysis, no nausea, no orthopnea, no palpitations, no PND, no sputum production, no vomiting and no weakness. He has tried nitroglycerin for the symptoms. Review of Systems   Constitutional: Negative for chills and fever. HENT: Negative for nosebleeds. Eyes: Negative for blurred vision and double vision. Respiratory: Negative for cough, hemoptysis, sputum production and wheezing. Cardiovascular: Positive for chest pain. Negative for palpitations, orthopnea, claudication, leg swelling and PND. Gastrointestinal: Negative for heartburn, nausea and vomiting. Musculoskeletal: Negative for myalgias. Skin: Negative for rash. Neurological: Negative for dizziness and weakness. Endo/Heme/Allergies: Does not bruise/bleed easily.      Family History   Problem Relation Age of Onset    Hypertension Mother     Diabetes Mother     Hypertension Father     Cancer Father         Prostate  Heart Disease Father         Heart Attack - 2013    Heart Attack Father     Stroke Brother     Migraines Brother     Cancer Sister         Breast    Migraines Sister     Stroke Brother        Past Medical History:   Diagnosis Date    Back pain     CAD (coronary artery disease)     Chest pain     Coronary artery spasm (HCC)     GERD (gastroesophageal reflux disease)     Headache     migraines    Hypertension     Kidney disease     Kidney stone     Nausea     Sleep apnea     Sleep apnea     uses cpap    Sleep disorder     Vomiting        Past Surgical History:   Procedure Laterality Date    CARDIAC SURG PROCEDURE UNLIST      cardiac catheterization    COLONOSCOPY N/A 9/30/2016    COLONOSCOPY with [facility]helps [facility] at [facility] ENDOSCOPY    HX BACK SURGERY      x3    HX COLONOSCOPY      HX ENDOSCOPY      HX HEENT      sinus sx    HX ORTHOPAEDIC      left shoulder, right knee, left thumb, left great toe sx    HX UROLOGICAL      Lithotripsy    DE COLSC FLX W/RMVL OF TUMOR POLYP LESION SNARE TQ N/A 09/30/2016    Dr. Sean Costello       Social History     Tobacco Use    Smoking status: Never Smoker    Smokeless tobacco: Never Used   Substance Use Topics    Alcohol use: Yes     Comment: rare use       No Known Allergies        Visit Vitals  /88   Pulse (!) 101   Ht 5' 9\" (1.753 m)   Wt 113.9 kg (251 lb)   BMI 37.07 kg/m²         Physical Exam   Constitutional: He is oriented to person, place, and time. He appears well-developed and well-nourished. HENT:   Head: Normocephalic and atraumatic. Eyes: Conjunctivae are normal.   Neck: Neck supple. No JVD present. No tracheal deviation present. No thyromegaly present. Cardiovascular: Normal rate, regular rhythm and normal heart sounds. Exam reveals no gallop and no friction rub. No murmur heard. Pulmonary/Chest: Breath sounds normal. No respiratory distress. He has no wheezes. He has no rales. He exhibits no tenderness. Abdominal: Soft. There is no tenderness. Musculoskeletal: He exhibits no edema. Neurological: He is alert and oriented to person, place, and time. Skin: Skin is warm and dry. Psychiatric: He has a normal mood and affect. Mr. Heydi Glover has a reminder for a \"due or due soon\" health maintenance. I have asked that he contact his primary care provider for follow-up on this health maintenance. Cath-2004  rca spasm-  lcx 30-40%  Stress test-2015  Fixed ant defect  Echo-2015  Normal lvef  ekg-7/2016-  wnl  I Have personally reviewed recent relevant labs available and discussed with patient    Assessment         ICD-10-CM ICD-9-CM    1. Coronary artery disease involving native coronary artery of native heart with angina pectoris with documented spasm (HCC) I25.111 414.01 isosorbide mononitrate ER (IMDUR) 60 mg CR tablet     413.9     recent increase in pain at rest   adjust meds   2. Essential hypertension with goal blood pressure less than 140/90 I10 401.9     Stable   3. Hyperlipidemia, unspecified hyperlipidemia type E78.5 272.4     Continue statin-labs reviewed 7/2018 LDL controlled   4. DB on CPAP G47.33 327.23     Z99.89 V46.8     Continue treatment   5. Coronary artery disease involving native coronary artery of native heart with angina pectoris with documented spasm (HCC) I25.111 414.01 isosorbide mononitrate ER (IMDUR) 60 mg CR tablet     413.9    Had tried cardizem in past -had drop in bp  No acei/normal lvef    Medications Discontinued During This Encounter   Medication Reason    azithromycin (ZITHROMAX) 250 mg tablet Not A Current Medication    benzonatate (TESSALON) 100 mg capsule Not A Current Medication    isosorbide mononitrate ER (IMDUR) 30 mg tablet     amLODIPine (NORVASC) 2.5 mg tablet        Orders Placed This Encounter    isosorbide mononitrate ER (IMDUR) 60 mg CR tablet     Sig: Take 1 Tab by mouth every morning.      Dispense:  90 Tab     Refill:  1    amLODIPine (NORVASC) 5 mg tablet     Sig: Take 1 Tab by mouth daily. Dispense:  90 Tab     Refill:  1       Follow-up Disposition:  Return in about 4 weeks (around 2/28/2019).

## 2019-01-31 NOTE — TELEPHONE ENCOUNTER
Patient calling requesting a new prescription for a new cpap mask/headgear. Please advise.      Last Visit:Tuesday, September 11, 2018

## 2019-02-11 ENCOUNTER — TELEPHONE (OUTPATIENT)
Dept: FAMILY MEDICINE CLINIC | Facility: CLINIC | Age: 54
End: 2019-02-11

## 2019-02-11 NOTE — TELEPHONE ENCOUNTER
Last office visit note faxed over to [address] at [phone] as requested with confirmation.

## 2019-02-11 NOTE — TELEPHONE ENCOUNTER
Gorge Sandifer at [facility] called stating they need last office note fax [phone] so they can get authorization for CPap supplies.

## 2019-02-19 ENCOUNTER — OFFICE VISIT (OUTPATIENT)
Dept: UROLOGY | Age: 54
End: 2019-02-19

## 2019-02-19 VITALS
HEART RATE: 88 BPM | DIASTOLIC BLOOD PRESSURE: 87 MMHG | BODY MASS INDEX: 36.73 KG/M2 | OXYGEN SATURATION: 99 % | HEIGHT: 69 IN | SYSTOLIC BLOOD PRESSURE: 133 MMHG | WEIGHT: 248 LBS

## 2019-02-19 DIAGNOSIS — N40.1 BPH WITH OBSTRUCTION/LOWER URINARY TRACT SYMPTOMS: ICD-10-CM

## 2019-02-19 DIAGNOSIS — N41.1 CHRONIC PROSTATITIS: ICD-10-CM

## 2019-02-19 DIAGNOSIS — Z80.42 FAMILY HISTORY OF PROSTATE CANCER: Primary | ICD-10-CM

## 2019-02-19 DIAGNOSIS — N13.8 BPH WITH OBSTRUCTION/LOWER URINARY TRACT SYMPTOMS: ICD-10-CM

## 2019-02-19 DIAGNOSIS — N20.0 KIDNEY STONE: ICD-10-CM

## 2019-02-19 LAB
BILIRUB UR QL STRIP: NEGATIVE
GLUCOSE UR-MCNC: NEGATIVE MG/DL
KETONES P FAST UR STRIP-MCNC: NEGATIVE MG/DL
PH UR STRIP: 7.5 [PH] (ref 4.6–8)
PROT UR QL STRIP: NEGATIVE
SP GR UR STRIP: 1.01 (ref 1–1.03)
UA UROBILINOGEN AMB POC: NORMAL (ref 0.2–1)
URINALYSIS CLARITY POC: CLEAR
URINALYSIS COLOR POC: YELLOW
URINE BLOOD POC: NEGATIVE
URINE LEUKOCYTES POC: NEGATIVE
URINE NITRITES POC: NEGATIVE

## 2019-02-19 RX ORDER — SULFAMETHOXAZOLE AND TRIMETHOPRIM 800; 160 MG/1; MG/1
1 TABLET ORAL 2 TIMES DAILY
Qty: 42 TAB | Refills: 0 | Status: SHIPPED | OUTPATIENT
Start: 2019-02-19 | End: 2019-02-19 | Stop reason: SDUPTHER

## 2019-02-19 RX ORDER — SULFAMETHOXAZOLE AND TRIMETHOPRIM 800; 160 MG/1; MG/1
1 TABLET ORAL 2 TIMES DAILY
Qty: 42 TAB | Refills: 0 | Status: SHIPPED | OUTPATIENT
Start: 2019-02-19 | End: 2019-03-12

## 2019-02-19 NOTE — PATIENT INSTRUCTIONS
Prostate Cancer Screening: Care Instructions Your Care Instructions The prostate gland is an organ found just below a man's bladder. It is the size and shape of a walnut. It surrounds the tube that carries urine from the bladder out of the body through the penis. This tube is called the urethra. Prostate cancer is the abnormal growth of cells in the prostate. It is the second most common type of cancer in men. (Skin cancer is the most common.) Most cases of prostate cancer occur in men older than 72. The disease runs in families. And it's more common in -American men. When it's found and treated early, prostate cancer may be cured. But it is not always treated. This is because prostate cancer may not shorten your life, especially if you are older and the cancer is growing slowly. Follow-up care is a key part of your treatment and safety. Be sure to make and go to all appointments, and call your doctor if you are having problems. It's also a good idea to know your test results and keep a list of the medicines you take. What are the screening tests for prostate cancer? The main screening test for prostate cancer is the prostate-specific antigen (PSA) test. This is a blood test that measures how much PSA is in your blood. A high level may mean that you have an enlargement, an infection, or cancer. Along with the PSA test, you may have a digital rectal exam. The digital (finger) rectal exam checks for anything abnormal in your prostate. To do the exam, the doctor puts a lubricated, gloved finger into your rectum. If these tests suggest cancer, you may need a prostate biopsy. How is prostate cancer diagnosed? In a biopsy, the doctor takes small tissue samples from your prostate gland. Another doctor then looks at the tissue under a microscope to see if there are cancer cells, signs of infection, or other problems. The results help diagnose prostate cancer. What are the pros and cons of screening? Neither a PSA test nor a digital rectal exam can tell you for sure that you do or do not have cancer. But they can help you decide if you need more tests, such as a prostate biopsy. Screening tests may be useful because most men with prostate cancer don't have symptoms. It can be hard to know if you have cancer until it is more advanced. And then it's harder to treat. But having a PSA test can also cause harm. The test may show high levels of PSA that aren't caused by cancer. So you could have a prostate biopsy you didn't need. Or the PSA test might be normal when there is cancer, so a cancer might not be found early. The test can also find cancers that would never have caused a problem during your lifetime. So you might have treatment that was not needed. Prostate cancer usually develops late in life and grows slowly. For many men, it does not shorten their lives. Some experts advise screening only for men who are at high risk. Talk with your doctor to see if screening is right for you. Where can you learn more? Go to http://sandra-ramana.info/. Enter R550 in the search box to learn more about \"Prostate Cancer Screening: Care Instructions. \" Current as of: March 27, 2018 Content Version: 11.9 © 9270-9774 [employer]. Care instructions adapted under license by [employer] (which disclaims liability or warranty for this information). If you have questions about a medical condition or this instruction, always ask your healthcare professional. [place] [address] any warranty or liability for your use of this information.

## 2019-02-19 NOTE — PROGRESS NOTES
Vincent Lorenzo 52 y.o. male Mr. Payton Romero seen today for evaluation of irritable voiding symptoms experiencing daytime urgency frequency every 2 hours and nocturia 3-4 episodes per night not associated with dysuria no hematuria flank pain or pain in the pelvis. No history of UTIs-has experienced several episodes of urgency incontinence Also has history of kidney stone disease treated with ESWL and endoscopic stone removal, most recently April 2016 Patient is voiding with a solid urinary stream good control nocturia 0 episodes per night patient has had no Kidney stone symptoms since April 2016 when a right kidney stone was removed by ureteroscopic laser lithotripsy Patient has had 3 previous symptomatic kidney stone episodes treated by ESWL or associated with spontaneous passage during the past 10 years 
  
PSA 1.4 on 9 in may 2016                               
Family history is significant for father developing prostate cancer at age 54 and 2 uncles on the father's side also developing prostate cancer in their 46s PVR 10 cc in February 2019 
  
CT scan imaging of the abdomen and pelvis on 26 April 2016 shows a 2 mm stone obstructing the right ureteropelvic junction with marked dilation of the right renal pelvis and calyceal system-normal left kidney and renal pelvis no sign of stone densities in either ureter-images have been downloaded for scanning into the electronic medical record and also laser printed for the patient-4 cm cyst also evident in the right kidney 
  
24 hour urine metabolic workup completed in July 2016: Urine volume 1.6 L, calcium 116, oxalate 41, uric acid 697, citric acid 854 
  
Review of Systems:   
CNS: no seizure syncop or dizziness no visual changes/common headaches Respiratory: no wheezing or shortness of breath Cardiovascular:chest pain secondary to coronary artery spasm-nitroglycerin p.r.n./hypertension Intestinal:no dyspepsia diarrhea or constipation Urinary: recurrent kidney stones Skeletal: chronic low back pain Endocrine:no diabetes or thyroid disease 
  
 
 
Allergies: No Known Allergies Medications:   
Current Outpatient Medications Medication Sig Dispense Refill  trimethoprim-sulfamethoxazole (BACTRIM DS, SEPTRA DS) 160-800 mg per tablet Take 1 Tab by mouth two (2) times a day for 21 days. 42 Tab 0  
 isosorbide mononitrate ER (IMDUR) 60 mg CR tablet Take 1 Tab by mouth every morning. 90 Tab 1  
 amLODIPine (NORVASC) 5 mg tablet Take 1 Tab by mouth daily. 90 Tab 1  
 rosuvastatin (CRESTOR) 10 mg tablet TAKE 1 TABLET NIGHTLY 90 Tab 4  
 nortriptyline (PAMELOR) 25 mg capsule Take 1 Cap by mouth nightly. Indications: headaches 90 Cap 3  
 esomeprazole (NEXIUM) 40 mg capsule Take 1 Cap by mouth daily. Indications: gastroesophageal reflux disease 90 Cap 4  
 sertraline (ZOLOFT) 50 mg tablet Take 1 Tab by mouth daily. 90 Tab 4  
 aspirin delayed-release 81 mg tablet Take  by mouth daily.  multivitamin (ONE A DAY) tablet Take 1 Tab by mouth daily.  cpap machine kit by Does Not Apply route. CPAP at 11 cm with humidifier. Mask: Wisp nasal, large. Length of need 99 months. Replace mask and accessories as needed times 12 months. Download data at 30 days and fax at [phone]. Dx- DB on CPAP, RLS, Obesity 1 Kit 0  
 LUMIGAN 0.01 % ophthalmic drops Administer 1 Drop to both eyes.  guaiFENesin-codeine (ROBITUSSIN AC) 100-10 mg/5 mL solution Take 5 mL by mouth four (4) times daily as needed for Cough. Max Daily Amount: 20 mL. 118 mL 0  
 nitroglycerin (NITROLINGUAL) 400 mcg/spray spray 1 Spray by SubLINGual route every five (5) minutes as needed for Chest Pain for up to 3 doses. 1 Bottle 1 Past Medical History:  
Diagnosis Date  Back pain  CAD (coronary artery disease)  Chest pain  Coronary artery spasm ([facility] [address]  GERD (gastroesophageal reflux disease)  Headache   
 migraines  Hypertension  Kidney disease  Kidney stone  Nausea  Sleep apnea  Sleep apnea   
 uses cpap  Sleep disorder  Vomiting Past Surgical History:  
Procedure Laterality Date  CARDIAC SURG PROCEDURE UNLIST    
 cardiac catheterization  COLONOSCOPY N/A 9/30/2016 COLONOSCOPY with biopsy performed by J Luis Parikh MD at [facility] ENDOSCOPY  
 HX BACK SURGERY    
 x3  
 HX COLONOSCOPY    
 HX ENDOSCOPY    
 HX HEENT    
 sinus sx  HX ORTHOPAEDIC    
 left shoulder, right knee, left thumb, left great toe sx  HX UROLOGICAL Lithotripsy  [place] COLSC FLX W/RMVL OF TUMOR POLYP LESION SNARE TQ N/A 09/30/2016 Dr. Sondra Nino Social History Socioeconomic History  Marital status:  Spouse name: Not on file  Number of children: Not on file  Years of education: Not on file  Highest education level: Not on file Social Needs  Financial resource strain: Not on file  Food insecurity - worry: Not on file  Food insecurity - inability: Not on file  Transportation needs - medical: Not on file  Transportation needs - non-medical: Not on file Occupational History  Not on file Tobacco Use  Smoking status: Never Smoker  Smokeless tobacco: Never Used Substance and Sexual Activity  Alcohol use: Yes Comment: rare use  Drug use: No  
 Sexual activity: Yes  
  Partners: Female Other Topics Concern  Not on file Social History Narrative ** Merged History Encounter ** Family History Problem Relation Age of Onset  Hypertension Mother  Diabetes Mother  Hypertension Father  Cancer Father Prostate  Heart Disease Father Heart Attack - 2013  Heart Attack Father  Stroke Brother  Migraines Brother  Cancer Sister Breast  
 Migraines Sister  Stroke Brother Physical Examination: Well-nourished mature male in no apparent distress Prostate by TACOS is large rounded smooth benign consistency nontender-no induration no nodularity No rectal masses induration or tenderness Urinalysis: Negative dipstick/nitrite negative/heme-negative PVR today 10 cc Impression: Chronic prostatitis BPH Kidney stone history Plan: Septra DS twice daily times 3 weeks empiric treatment for suspected chronic prostatitis PSA creatinine today RTC 4 weeks cystoscopy if symptoms persist 
 
 
More than 1/2 of this 25 minute visit was spent in counselling and coordination of care, as described above. Ericka Cabezas MD 
-electronically signed- 
 
PLEASE NOTE: 
This document has been produced using voice recognition software. Unrecognized errors in transcription may be present.

## 2019-02-19 NOTE — PROGRESS NOTES
Mr. Ashly Young has a reminder for a \"due or due soon\" health maintenance. I have asked that he contact his primary care provider for follow-up on this health maintenance.

## 2019-02-20 LAB
CREAT SERPL-MCNC: 0.87 MG/DL (ref 0.7–1.33)
PSA TOTAL,4108: 1.2 NG/ML

## 2019-02-28 ENCOUNTER — OFFICE VISIT (OUTPATIENT)
Dept: CARDIOLOGY CLINIC | Age: 54
End: 2019-02-28

## 2019-02-28 VITALS
DIASTOLIC BLOOD PRESSURE: 68 MMHG | SYSTOLIC BLOOD PRESSURE: 118 MMHG | HEART RATE: 94 BPM | BODY MASS INDEX: 37.03 KG/M2 | WEIGHT: 250 LBS | HEIGHT: 69 IN

## 2019-02-28 DIAGNOSIS — I25.111 CORONARY ARTERY DISEASE INVOLVING NATIVE CORONARY ARTERY OF NATIVE HEART WITH ANGINA PECTORIS WITH DOCUMENTED SPASM (HCC): Primary | ICD-10-CM

## 2019-02-28 DIAGNOSIS — E78.5 HYPERLIPIDEMIA, UNSPECIFIED HYPERLIPIDEMIA TYPE: ICD-10-CM

## 2019-02-28 DIAGNOSIS — I10 ESSENTIAL HYPERTENSION WITH GOAL BLOOD PRESSURE LESS THAN 140/90: ICD-10-CM

## 2019-02-28 NOTE — LETTER
Vincent Stilesdaniella 1965 2/28/2019 Dear MD Shweta Sutton MD 
 
I had the pleasure of evaluating  Mr. Payton Romero in office today. Below are the relevant portions of my assessment and plan of care. ICD-10-CM ICD-9-CM 1. Coronary artery disease involving native coronary artery of native heart with angina pectoris with documented spasm ([facility]) I25.111 414.01   
  413.9 Stable cardiac status angina improved 2. Essential hypertension with goal blood pressure less than 140/90 I10 401.9 Blood pressure control 3. Hyperlipidemia, unspecified hyperlipidemia type E78.5 272.4 Continue treatment Current Outpatient Medications Medication Sig Dispense Refill  trimethoprim-sulfamethoxazole (BACTRIM DS, SEPTRA DS) 160-800 mg per tablet Take 1 Tab by mouth two (2) times a day for 21 days. 42 Tab 0  
 isosorbide mononitrate ER (IMDUR) 60 mg CR tablet Take 1 Tab by mouth every morning. 90 Tab 1  
 amLODIPine (NORVASC) 5 mg tablet Take 1 Tab by mouth daily. 90 Tab 1  
 rosuvastatin (CRESTOR) 10 mg tablet TAKE 1 TABLET NIGHTLY 90 Tab 4  
 guaiFENesin-codeine (ROBITUSSIN AC) 100-10 mg/5 mL solution Take 5 mL by mouth four (4) times daily as needed for Cough. Max Daily Amount: 20 mL. 118 mL 0  
 nitroglycerin (NITROLINGUAL) 400 mcg/spray spray 1 Spray by SubLINGual route every five (5) minutes as needed for Chest Pain for up to 3 doses. 1 Bottle 1  
 nortriptyline (PAMELOR) 25 mg capsule Take 1 Cap by mouth nightly. Indications: headaches 90 Cap 3  
 esomeprazole (NEXIUM) 40 mg capsule Take 1 Cap by mouth daily. Indications: gastroesophageal reflux disease 90 Cap 4  
 sertraline (ZOLOFT) 50 mg tablet Take 1 Tab by mouth daily. 90 Tab 4  
 aspirin delayed-release 81 mg tablet Take  by mouth daily.  multivitamin (ONE A DAY) tablet Take 1 Tab by mouth daily.  cpap machine kit by Does Not Apply route. CPAP at 11 cm with humidifier. Mask: Wisp nasal, large. Length of need 99 months. Replace mask and accessories as needed times 12 months. Download data at 30 days and fax at [phone]. Dx- DB on CPAP, RLS, Obesity 1 Kit 0  
 LUMIGAN 0.01 % ophthalmic drops Administer 1 Drop to both eyes. No orders of the defined types were placed in this encounter. If you have questions, please do not hesitate to call me. I look forward to following Mr. Parham Brought along with you. Sincerely, Elizabeth Hart MD

## 2019-02-28 NOTE — PROGRESS NOTES
HISTORY OF PRESENT ILLNESS  Kena Cameron is a 52 y.o. male. Patient with cad,coronary spasm,htn  On follow up patient denies any sob, palpitation or other significant symptoms. Hypertension   The history is provided by the patient. This is a chronic problem. The problem occurs constantly. The problem has not changed since onset. Associated symptoms include chest pain. Cholesterol Problem   The history is provided by the patient. This is a chronic problem. The problem occurs constantly. The problem has not changed since onset. Associated symptoms include chest pain. Chest Pain (Angina)    The history is provided by the patient. This is a recurrent problem. The problem has been gradually worsening. The problem occurs every several days. The pain is associated with exertion and rest. The pain is present in the substernal region. The pain is mild. The quality of the pain is described as pressure-like. The pain does not radiate. Pertinent negatives include no claudication, no cough, no dizziness, no fever, no hemoptysis, no nausea, no orthopnea, no palpitations, no PND, no sputum production, no vomiting and no weakness. He has tried nitroglycerin for the symptoms. Review of Systems   Constitutional: Negative for chills and fever. HENT: Negative for nosebleeds. Eyes: Negative for blurred vision and double vision. Respiratory: Negative for cough, hemoptysis, sputum production and wheezing. Cardiovascular: Positive for chest pain. Negative for palpitations, orthopnea, claudication, leg swelling and PND. Gastrointestinal: Negative for heartburn, nausea and vomiting. Musculoskeletal: Negative for myalgias. Skin: Negative for rash. Neurological: Negative for dizziness and weakness. Endo/Heme/Allergies: Does not bruise/bleed easily.      Family History   Problem Relation Age of Onset    Hypertension Mother     Diabetes Mother     Hypertension Father     Cancer Father         Prostate  Heart Disease Father         Heart Attack - 2013    Heart Attack Father     Stroke Brother     Migraines Brother     Cancer Sister         Breast    Migraines Sister     Stroke Brother        Past Medical History:   Diagnosis Date    Back pain     CAD (coronary artery disease)     Chest pain     Coronary artery spasm (H[facility])     GERD (gastroesophageal reflux disease)     Headache     migraines    Hypertension     Kidney disease     Kidney stone     Nausea     Sleep apnea     Sleep apnea     uses cpap    Sleep disorder     Vomiting        Past Surgical History:   Procedure Laterality Date    CARDIAC SURG PROCEDURE UNLIST      cardiac catheterization    COLONOSCOPY N/A 9/30/2016    COLONOSCOPY with [facility]ac[facility] ENDOSCOPY    HX BACK SURGERY      x3    HX COLONOSCOPY      HX ENDOSCOPY      HX HEENT      sinus sx    HX ORTHOPAEDIC      left shoulder, right knee, left thumb, left great toe sx    HX UROLOGICAL      Lithotripsy    [place] COLSC FLX W/RMVL OF TUMOR POLYP LESION SNARE TQ N/A 09/30/2016    Dr. Honorio Walker       Social History     Tobacco Use    Smoking status: Never Smoker    Smokeless tobacco: Never Used   Substance Use Topics    Alcohol use: Yes     Comment: rare use       No Known Allergies        Visit Vitals  /68   Pulse 94   Ht 5' 9\" (1.753 m)   Wt 113.4 kg (250 lb)   BMI 36.92 kg/m²         Physical Exam   Constitutional: He is oriented to person, place, and time. He appears well-developed and well-nourished. HENT:   Head: Normocephalic and atraumatic. Eyes: Conjunctivae are normal.   Neck: Neck supple. No JVD present. No tracheal deviation present. No thyromegaly present. Cardiovascular: Normal rate, regular rhythm and normal heart sounds. Exam reveals no gallop and no friction rub. No murmur heard. Pulmonary/Chest: Breath sounds normal. No respiratory distress. He has no wheezes. He has no rales. He exhibits no tenderness. Abdominal: Soft. There is no tenderness. Musculoskeletal: He exhibits no edema. Neurological: He is alert and oriented to person, place, and time. Skin: Skin is warm and dry. Psychiatric: He has a normal mood and affect. Mr. Yina Campo has a reminder for a \"due or due soon\" health maintenance. I have asked that he contact his primary care provider for follow-up on this health maintenance. Cath-2004  rca spasm-  lcx 30-40%  Stress test-2015  Fixed ant defect  Echo-2015  Normal lvef  ekg-7/2016-  wnl  I Have personally reviewed recent relevant labs available and discussed with patient    Assessment         ICD-10-CM ICD-9-CM    1. Coronary artery disease involving native coronary artery of native heart with angina pectoris with documented spasm ([facility] [address] I25.111 414.01      413.9     Stable cardiac status angina improved   2. Essential hypertension with goal blood pressure less than 140/90 I10 401.9     Blood pressure control   3. Hyperlipidemia, unspecified hyperlipidemia type E78.5 272.4     Continue treatment   Had tried cardizem in past -had drop in bp  No acei/normal lvef  2/2019  Angina significantly improved on addition of Imdur. Blood pressure controlled continue medical management  There are no discontinued medications. No orders of the defined types were placed in this encounter. Follow-up Disposition:  Return in about 6 months (around 8/28/2019).

## 2019-02-28 NOTE — PROGRESS NOTES
1. Have you been to the ER, urgent care clinic since your last visit? Hospitalized since your last visit?     no  2. Have you seen or consulted any other health care providers outside of the [address] [place] [address] since your last visit? Include any pap smears or colon screening. Yes Where: pcp     3. Since your last visit, have you had any of the following symptoms?  no

## 2019-03-25 ENCOUNTER — OFFICE VISIT (OUTPATIENT)
Dept: UROLOGY | Age: 54
End: 2019-03-25

## 2019-03-25 VITALS
HEIGHT: 69 IN | WEIGHT: 248 LBS | HEART RATE: 95 BPM | BODY MASS INDEX: 36.73 KG/M2 | OXYGEN SATURATION: 97 % | SYSTOLIC BLOOD PRESSURE: 117 MMHG | DIASTOLIC BLOOD PRESSURE: 81 MMHG

## 2019-03-25 DIAGNOSIS — N13.8 BPH WITH OBSTRUCTION/LOWER URINARY TRACT SYMPTOMS: ICD-10-CM

## 2019-03-25 DIAGNOSIS — N32.3 BLADDER DIVERTICULUM: Primary | ICD-10-CM

## 2019-03-25 DIAGNOSIS — R35.1 NOCTURIA: ICD-10-CM

## 2019-03-25 DIAGNOSIS — R35.0 URINARY FREQUENCY: ICD-10-CM

## 2019-03-25 DIAGNOSIS — N40.1 BPH WITH OBSTRUCTION/LOWER URINARY TRACT SYMPTOMS: ICD-10-CM

## 2019-03-25 LAB
BILIRUB UR QL STRIP: NEGATIVE
GLUCOSE UR-MCNC: NEGATIVE MG/DL
KETONES P FAST UR STRIP-MCNC: NEGATIVE MG/DL
PH UR STRIP: 6 [PH] (ref 4.6–8)
PROT UR QL STRIP: NEGATIVE
SP GR UR STRIP: 1.02 (ref 1–1.03)
UA UROBILINOGEN AMB POC: NORMAL (ref 0.2–1)
URINALYSIS CLARITY POC: CLEAR
URINALYSIS COLOR POC: YELLOW
URINE BLOOD POC: NEGATIVE
URINE LEUKOCYTES POC: NEGATIVE
URINE NITRITES POC: NEGATIVE

## 2019-03-25 RX ORDER — CIPROFLOXACIN 500 MG/1
500 TABLET ORAL 2 TIMES DAILY
Qty: 20 TAB | Refills: 0 | Status: SHIPPED | OUTPATIENT
Start: 2019-03-25 | End: 2019-04-04

## 2019-03-25 NOTE — PROGRESS NOTES
Ashish Yap 52 y.o. male     Mr. Trenton Berry seen today for cystoscopy assessing irritative voiding symptoms   irritable voiding symptoms experiencing daytime urgency frequency every 2 hours and nocturia 3-4 episodes per night not associated with dysuria no hematuria flank pain or pain in the pelvis.   No history of UTIs-has experienced several episodes of urgency incontinence     Also has history of kidney stone disease treated with ESWL and endoscopic stone removal, most recently April 2016  Patient is voiding with a solid urinary stream good control nocturia 0 episodes per night patient has had no  Kidney stone symptoms since April 2016 when a right kidney stone was removed by ureteroscopic laser lithotripsy  Patient has had 3 previous symptomatic kidney stone episodes treated by ESWL or associated with spontaneous passage during the past 10 years     PSA 1.4 on 9 in may 2016   PSA 1.2 on 20 February 2019       Creatinine 0.8                              Family history is significant for father developing prostate cancer at age 54 and 2 uncles on the father's side also developing prostate cancer in their 46s     PVR 10 cc in February 2019     CT scan imaging of the abdomen and pelvis on 26 April 2016 shows a 2 mm stone obstructing the right ureteropelvic junction with marked dilation of the right renal pelvis and calyceal system-normal left kidney and renal pelvis no sign of stone densities in either ureter-images have been downloaded for scanning into the electronic medical record and also laser printed for the patient-4 cm cyst also evident in the right kidney     24 hour urine metabolic workup completed in July 2016: Urine volume 1.6 L, calcium 116, oxalate 41, uric acid 697, citric acid 854     Review of Systems:    CNS: no seizure syncop or dizziness no visual changes/common headaches  Respiratory: no wheezing or shortness of breath  Cardiovascular:chest pain secondary to coronary artery spasm-nitroglycerin p.r.n./hypertension  Intestinal:no dyspepsia diarrhea or constipation  Urinary: recurrent kidney stones  Skeletal: chronic low back pain  Endocrine:no diabetes or thyroid disease      Allergies: No Known Allergies   Medications:           Current Outpatient Medications   Medication Sig Dispense Refill    trimethoprim-sulfamethoxazole (BACTRIM DS, SEPTRA DS) 160-800 mg per tablet Take 1 Tab by mouth two (2) times a day for 21 days. 42 Tab 0    isosorbide mononitrate ER (IMDUR) 60 mg CR tablet Take 1 Tab by mouth every morning. 90 Tab 1    amLODIPine (NORVASC) 5 mg tablet Take 1 Tab by mouth daily. 90 Tab 1    rosuvastatin (CRESTOR) 10 mg tablet TAKE 1 TABLET NIGHTLY 90 Tab 4    nortriptyline (PAMELOR) 25 mg capsule Take 1 Cap by mouth nightly. Indications: headaches 90 Cap 3    esomeprazole (NEXIUM) 40 mg capsule Take 1 Cap by mouth daily. Indications: gastroesophageal reflux disease 90 Cap 4    sertraline (ZOLOFT) 50 mg tablet Take 1 Tab by mouth daily. 90 Tab 4    aspirin delayed-release 81 mg tablet Take  by mouth daily.        multivitamin (ONE A DAY) tablet Take 1 Tab by mouth daily.               Allergies: No Known Allergies   Medications:    Current Outpatient Medications   Medication Sig Dispense Refill    isosorbide mononitrate ER (IMDUR) 60 mg CR tablet Take 1 Tab by mouth every morning. 90 Tab 1    amLODIPine (NORVASC) 5 mg tablet Take 1 Tab by mouth daily. 90 Tab 1    rosuvastatin (CRESTOR) 10 mg tablet TAKE 1 TABLET NIGHTLY 90 Tab 4    guaiFENesin-codeine (ROBITUSSIN AC) 100-10 mg/5 mL solution Take 5 mL by mouth four (4) times daily as needed for Cough. Max Daily Amount: 20 mL. 118 mL 0    nitroglycerin (NITROLINGUAL) 400 mcg/spray spray 1 Spray by SubLINGual route every five (5) minutes as needed for Chest Pain for up to 3 doses. 1 Bottle 1    nortriptyline (PAMELOR) 25 mg capsule Take 1 Cap by mouth nightly.  Indications: headaches 90 Cap 3    esomeprazole (NEXIUM) 40 mg capsule Take 1 Cap by mouth daily. Indications: gastroesophageal reflux disease 90 Cap 4    sertraline (ZOLOFT) 50 mg tablet Take 1 Tab by mouth daily. 90 Tab 4    aspirin delayed-release 81 mg tablet Take  by mouth daily.  multivitamin (ONE A DAY) tablet Take 1 Tab by mouth daily.  cpap machine kit by Does Not Apply route. CPAP at 11 cm with humidifier. Mask: Wisp nasal, large. Length of need 99 months. Replace mask and accessories as needed times 12 months. Download data at 30 days and fax at [phone]. Dx- DB on CPAP, RLS, Obesity 1 Kit 0    LUMIGAN 0.01 % ophthalmic drops Administer 1 Drop to both eyes.          Past Medical History:   Diagnosis Date    Back pain     CAD (coronary artery disease)     Chest pain     Coronary artery spasm (HCC)     GERD (gastroesophageal reflux disease)     Headache     migraines    Hypertension     Kidney disease     Kidney stone     Nausea     Sleep apnea     Sleep apnea     uses cpap    Sleep disorder     Vomiting       Past Surgical History:   Procedure Laterality Date    CARDIAC SURG PROCEDURE UNLIST      cardiac catheterization    COLONOSCOPY N/A 9/30/2016    COLONOSCOPY with biopsy performed by Navya Loving [facility] at [facility] ENDOSCOPY    HX BACK SURGERY      x3    HX COLONOSCOPY      HX ENDOSCOPY      HX HEENT      sinus sx    HX ORTHOPAEDIC      left shoulder, right knee, left thumb, left great toe sx    HX UROLOGICAL      Lithotripsy    UT COLSC FLX W/RMVL OF TUMOR POLYP LESION SNARE TQ N/A 09/30/2016    Dr. Jem Herrera     Social History     Socioeconomic History    Marital status:      Spouse name: Not on file    Number of children: Not on file    Years of education: Not on file    Highest education level: Not on file   Occupational History    Not on file   Social Needs    Financial resource strain: Not on file    Food insecurity:     Worry: Not on file     Inability: Not on file    Transportation needs:     Medical: Not on file Non-medical: Not on file   Tobacco Use    Smoking status: Never Smoker    Smokeless tobacco: Never Used   Substance and Sexual Activity    Alcohol use: Yes     Comment: rare use    Drug use: No    Sexual activity: Yes     Partners: Female   Lifestyle    Physical activity:     Days per week: Not on file     Minutes per session: Not on file    Stress: Not on file   Relationships    Social connections:     Talks on phone: Not on file     Gets together: Not on file     Attends Baptism service: Not on file     Active member of club or organization: Not on file     Attends meetings of clubs or organizations: Not on file     Relationship status: Not on file    Intimate partner violence:     Fear of current or ex partner: Not on file     Emotionally abused: Not on file     Physically abused: Not on file     Forced sexual activity: Not on file   Other Topics Concern    Not on file   Social History Narrative    ** Merged History Encounter **           Family History   Problem Relation Age of Onset    Hypertension Mother     Diabetes Mother     Hypertension Father     Cancer Father         Prostate    Heart Disease Father         Heart Attack - 2013    Heart Attack Father     Stroke Brother     Migraines Brother     Cancer Sister         Breast    Migraines Sister     Stroke Brother         Physical Examination: Well-nourished mature male in no apparent distress       Urinalysis: Negative dipstick/nitrite negative/heme-negative    Cystoscopy Report: After prepping the glans penis with Betadine solution and instilling 2% lidocaine jelly intraurethrally a flexible cystoscope was passed through the urethra into the bladder revealing normal urothelium throughout.   There are no strictures, stones, or tumors evident-no trabeculation or diverticuli evident ureteral orifices are both slitlike in appearance with clear urine jets observed from both sides-  there are no abnormal blood vessels-no glomerulations hemorrhages injection or friability. The prostatic channel is anatomically patent-procedure was uncomplicated and well-tolerated  EBL-none  Specimen-none      Impression: Symptomatic BPH normal cystoscopic findings-                        Kidney stone disease                                Plan: Cipro 500 mg twice daily times 2 days              Flomax 0.4 mg daily refill x3      RTC 6 months PVR    More than 1/2 of this 15 minute visit was spent in counselling and coordination of care, as described above. Guille Simental MD  -electronically signed-    PLEASE NOTE:  This document has been produced using voice recognition software. Unrecognized errors in transcription may be present.

## 2019-03-25 NOTE — PROGRESS NOTES
Mr. Hamida Sharif has a reminder for a \"due or due soon\" health maintenance. I have asked that he contact his primary care provider for follow-up on this health maintenance. [facility]  OFFICE PROCEDURE PROGRESS NOTE        Chart reviewed for the following:   Sandee PETERS LPN, have reviewed the History, Physical and updated the Allergic reactions for [address] performed immediately prior to start of procedure:   Odilon Leon LPN, have performed the following reviews on [facility] prior to the start of the procedure:            * Patient was identified by name and date of birth   * Agreement on procedure being performed was verified  * Risks and Benefits explained to the patient  * Procedure site verified and marked as necessary  * Patient was positioned for comfort  * Consent was signed and verified     Time: 11:39AM      Date of procedure: 3/25/2019    Procedure performed by:  Michelle Neal MD    Provider assisted by: Krystyna Conti LPN    Patient assisted by: self    How tolerated by patient: tolerated the procedure well with no complications    Post Procedural Pain Scale: 0 - No Hurt    Comments: none    Patient verbalized understanding of procedure and post procedure instructions.

## 2019-03-25 NOTE — PATIENT INSTRUCTIONS
Cystoscopy: Care Instructions  Your Care Instructions  Cystoscopy is a test. It uses a thin, lighted tube called a cystoscope to see the inside of the bladder and the urethra. The urethra is the tube that carries urine out of the body. This test is helpful because it lets your doctor see areas of your bladder and urethra that are hard to see on X-rays. It can help your doctor find bladder stones, tumors, bleeding, and infection. During this test, your doctor also can take tissue and urine samples. And if your doctor finds small stones or growths, he or she can remove them. In most cases the scope is in the bladder for less than 10 minutes. But the entire test may take 45 minutes or longer. You will probably get local anesthesia. This numbs a small part of your body. Or you may get spinal anesthesia, which numbs more of your body. Once in a while, doctors use general anesthesia. It makes you sleep during surgery. If you get a local anesthetic, you may be able to get up right after the test. But if you had spinal or general anesthesia, you will stay in the recovery room until you are able to walk or you have feeling again in your lower body. This usually takes about an hour. Your doctor may be able to tell you some of the results right after the test. But the complete results may take several days. Follow-up care is a key part of your treatment and safety. Be sure to make and go to all appointments, and call your doctor if you are having problems. It's also a good idea to know your test results and keep a list of the medicines you take. How can you care for yourself at home? Before the test  · If you are having a local anesthetic, you can eat and drink before the test.  · If you are having a spinal or general anesthetic, do not eat or drink anything for at least 8 hours before the test. Tell your doctor what medicines you take.   · If you are not staying overnight in the hospital, make sure you have someone who can drive you home after the test.  After the test  · If your doctor prescribed antibiotics, take them as directed. Do not stop taking them just because you feel better. You need to take the full course of antibiotics. · You may have some burning when you urinate for a day or two after the test. You may feel better if you drink more fluids. This may also help prevent an infection. · Your urine may have a pinkish color for a few days after the test.  When should you call for help? Call your doctor now or seek immediate medical care if:    · Your urine is still red or you see blood clots after you have urinated several times.     · You cannot pass urine 8 hours after the test.     · You get a fever or chills.     · You have pain in your belly or your back just below your rib cage. This is also called flank pain.    Watch closely for changes in your health, and be sure to contact your doctor if:    · You have pain or burning when you urinate. A burning sensation is normal for a day or two after the test. But call if it does not get better.     · You have a frequent urge to urinate but can pass only small amounts of urine.     · Your urine is pink, red, or cloudy or smells bad. It is normal for the urine to have a pinkish color for a few days after the test. But call if it does not get better.     · You do not get better as expected. Where can you learn more? Go to http://sandra-ramana.info/. Enter H283 in the search box to learn more about \"Cystoscopy: Care Instructions. \"  Current as of: March 20, 2018  Content Version: 11.9  © 1796-9382 [employer]. Care instructions adapted under license by [employer] (which disclaims liability or warranty for this information).  If you have questions about a medical condition or this instruction, always ask your healthcare professional. [address] any warranty or liability for your use of this information.

## 2019-03-28 DIAGNOSIS — I10 ESSENTIAL HYPERTENSION WITH GOAL BLOOD PRESSURE LESS THAN 140/90: Primary | ICD-10-CM

## 2019-03-28 RX ORDER — AMLODIPINE BESYLATE 2.5 MG/1
TABLET ORAL
Qty: 90 TAB | Refills: 3 | OUTPATIENT
Start: 2019-03-28

## 2019-04-05 DIAGNOSIS — N40.1 BENIGN PROSTATIC HYPERPLASIA WITH LOWER URINARY TRACT SYMPTOMS, SYMPTOM DETAILS UNSPECIFIED: Primary | ICD-10-CM

## 2019-04-05 RX ORDER — TAMSULOSIN HYDROCHLORIDE 0.4 MG/1
0.4 CAPSULE ORAL DAILY
Qty: 14 CAP | Refills: 0 | Status: SHIPPED | OUTPATIENT
Start: 2019-04-05 | End: 2019-05-20

## 2019-04-05 RX ORDER — TAMSULOSIN HYDROCHLORIDE 0.4 MG/1
0.4 CAPSULE ORAL DAILY
Qty: 90 CAP | Refills: 3 | Status: SHIPPED | OUTPATIENT
Start: 2019-04-05 | End: 2021-02-18

## 2019-04-05 RX ORDER — AMLODIPINE BESYLATE 5 MG/1
5 TABLET ORAL DAILY
Qty: 90 TAB | Refills: 1 | Status: SHIPPED | OUTPATIENT
Start: 2019-04-05 | End: 2019-10-11 | Stop reason: SDUPTHER

## 2019-04-05 NOTE — TELEPHONE ENCOUNTER
Requested Prescriptions     Pending Prescriptions Disp Refills    amLODIPine (NORVASC) 5 mg tablet 90 Tab 1     Sig: Take 1 Tab by mouth daily.      Refused Prescriptions Disp Refills    amLODIPine (NORVASC) 2.5 mg tablet [Pharmacy Med Name: AMLODIPINE BESYLATE TABS 2.5MG] 90 Tab 3     Sig: TAKE 1 TABLET DAILY     Refused By: Luke Antony     Reason for Refusal: other

## 2019-04-06 ENCOUNTER — HOSPITAL ENCOUNTER (OUTPATIENT)
Age: 54
Discharge: HOME | End: 2019-04-06
Attending: ORTHOPAEDIC SURGERY
Payer: COMMERCIAL

## 2019-04-06 DIAGNOSIS — M77.11 LATERAL EPICONDYLITIS, RIGHT ELBOW: ICD-10-CM

## 2019-04-06 PROCEDURE — 73221 MRI JOINT UPR EXTREM W/O DYE: CPT

## 2019-05-20 ENCOUNTER — HOSPITAL ENCOUNTER (OUTPATIENT)
Dept: LAB | Age: 54
Discharge: HOME | End: 2019-05-20
Payer: COMMERCIAL

## 2019-05-20 ENCOUNTER — OFFICE VISIT (OUTPATIENT)
Dept: FAMILY MEDICINE CLINIC | Facility: CLINIC | Age: 54
End: 2019-05-20

## 2019-05-20 VITALS
HEIGHT: 69 IN | RESPIRATION RATE: 16 BRPM | OXYGEN SATURATION: 96 % | DIASTOLIC BLOOD PRESSURE: 78 MMHG | HEART RATE: 96 BPM | WEIGHT: 248 LBS | TEMPERATURE: 97.8 F | SYSTOLIC BLOOD PRESSURE: 118 MMHG | BODY MASS INDEX: 36.73 KG/M2

## 2019-05-20 DIAGNOSIS — Z01.818 PREOP EXAMINATION: Primary | ICD-10-CM

## 2019-05-20 DIAGNOSIS — Z99.89 OSA ON CPAP: ICD-10-CM

## 2019-05-20 DIAGNOSIS — Z13.21 ENCOUNTER FOR VITAMIN DEFICIENCY SCREENING: ICD-10-CM

## 2019-05-20 DIAGNOSIS — I10 ESSENTIAL HYPERTENSION WITH GOAL BLOOD PRESSURE LESS THAN 140/90: ICD-10-CM

## 2019-05-20 DIAGNOSIS — R73.03 PREDIABETES: ICD-10-CM

## 2019-05-20 DIAGNOSIS — G47.33 OSA ON CPAP: ICD-10-CM

## 2019-05-20 DIAGNOSIS — F33.9 RECURRENT DEPRESSION (HCC): ICD-10-CM

## 2019-05-20 DIAGNOSIS — K21.9 GASTROESOPHAGEAL REFLUX DISEASE WITHOUT ESOPHAGITIS: ICD-10-CM

## 2019-05-20 DIAGNOSIS — E66.01 SEVERE OBESITY (BMI 35.0-39.9) WITH COMORBIDITY (HCC): ICD-10-CM

## 2019-05-20 DIAGNOSIS — E78.5 HYPERLIPIDEMIA, UNSPECIFIED HYPERLIPIDEMIA TYPE: ICD-10-CM

## 2019-05-20 LAB
ALBUMIN SERPL-MCNC: 4 G/DL (ref 3.4–5)
ALBUMIN/GLOB SERPL: 1.1 {RATIO} (ref 0.8–1.7)
ALP SERPL-CCNC: 97 U/L (ref 45–117)
ALT SERPL-CCNC: 60 U/L (ref 16–61)
ANION GAP SERPL CALC-SCNC: 8 MMOL/L (ref 3–18)
AST SERPL-CCNC: 36 U/L (ref 15–37)
BASOPHILS # BLD: 0 K/UL (ref 0–0.1)
BASOPHILS NFR BLD: 0 % (ref 0–2)
BILIRUB SERPL-MCNC: 0.3 MG/DL (ref 0.2–1)
BUN SERPL-MCNC: 9 MG/DL (ref 7–18)
BUN/CREAT SERPL: 9 (ref 12–20)
CALCIUM SERPL-MCNC: 8.9 MG/DL (ref 8.5–10.1)
CHLORIDE SERPL-SCNC: 108 MMOL/L (ref 100–108)
CHOLEST SERPL-MCNC: 116 MG/DL
CO2 SERPL-SCNC: 27 MMOL/L (ref 21–32)
CREAT SERPL-MCNC: 1 MG/DL (ref 0.6–1.3)
DIFFERENTIAL METHOD BLD: ABNORMAL
EOSINOPHIL # BLD: 0.2 K/UL (ref 0–0.4)
EOSINOPHIL NFR BLD: 3 % (ref 0–5)
ERYTHROCYTE [DISTWIDTH] IN BLOOD BY AUTOMATED COUNT: 14.7 % (ref 11.6–14.5)
GLOBULIN SER CALC-MCNC: 3.5 G/DL (ref 2–4)
GLUCOSE SERPL-MCNC: 92 MG/DL (ref 74–99)
HCT VFR BLD AUTO: 45.8 % (ref 36–48)
HDLC SERPL-MCNC: 41 MG/DL (ref 40–60)
HDLC SERPL: 2.8 {RATIO} (ref 0–5)
HGB BLD-MCNC: 14.6 G/DL (ref 13–16)
LDLC SERPL CALC-MCNC: 49.2 MG/DL (ref 0–100)
LIPID PROFILE,FLP: NORMAL
LYMPHOCYTES # BLD: 2.4 K/UL (ref 0.9–3.6)
LYMPHOCYTES NFR BLD: 36 % (ref 21–52)
MCH RBC QN AUTO: 28.6 PG (ref 24–34)
MCHC RBC AUTO-ENTMCNC: 31.9 G/DL (ref 31–37)
MCV RBC AUTO: 89.6 FL (ref 74–97)
MONOCYTES # BLD: 0.6 K/UL (ref 0.05–1.2)
MONOCYTES NFR BLD: 8 % (ref 3–10)
NEUTS SEG # BLD: 3.7 K/UL (ref 1.8–8)
NEUTS SEG NFR BLD: 53 % (ref 40–73)
PLATELET # BLD AUTO: 248 K/UL (ref 135–420)
PMV BLD AUTO: 10.2 FL (ref 9.2–11.8)
POTASSIUM SERPL-SCNC: 4.8 MMOL/L (ref 3.5–5.5)
PROT SERPL-MCNC: 7.5 G/DL (ref 6.4–8.2)
RBC # BLD AUTO: 5.11 M/UL (ref 4.7–5.5)
SODIUM SERPL-SCNC: 143 MMOL/L (ref 136–145)
TRIGL SERPL-MCNC: 129 MG/DL (ref ?–150)
TSH SERPL DL<=0.05 MIU/L-ACNC: 1.55 UIU/ML (ref 0.36–3.74)
VLDLC SERPL CALC-MCNC: 25.8 MG/DL
WBC # BLD AUTO: 6.9 K/UL (ref 4.6–13.2)

## 2019-05-20 PROCEDURE — 80061 LIPID PANEL: CPT

## 2019-05-20 PROCEDURE — 36415 COLL VENOUS BLD VENIPUNCTURE: CPT

## 2019-05-20 PROCEDURE — 85025 COMPLETE CBC W/AUTO DIFF WBC: CPT

## 2019-05-20 PROCEDURE — 80053 COMPREHEN METABOLIC PANEL: CPT

## 2019-05-20 PROCEDURE — 82306 VITAMIN D 25 HYDROXY: CPT

## 2019-05-20 PROCEDURE — 84443 ASSAY THYROID STIM HORMONE: CPT

## 2019-05-20 NOTE — PROGRESS NOTES
Fabricio Fitzpatrick is a 52 y.o. male presenting today for Pre-op Exam (Right Elbow Common Extensor tendon repair) and Establish Care  . HPI:  Fabricio Fitzpatrick presents to the office today to establish care with provider. Patient has a past medical history for hypertension, hyperlipidemia, GERD, obstructive sleep apnea with the CPAP, prediabetes, depression, neuropathy, headaches and obesity. Patient presents today without any complaints of pain. He is negative for any chest pain, palpitation lower extremity edema. Denies any cough, wheezing or congestion. Is negative for abdominal pain, nausea, vomiting, diarrhea, headache or dizziness. Patient is scheduled for right elbow, and extension tendon repair with Dr. Angie Duarte on May 28, 2019 under general anesthesia with a scalene block. Patient presents today for preoperative examination. Patient last set of fasting labs was on July, 2018. Fasting labs will be done today. Patient notes he is followed by urology, Dr. Tomas Weiner as well as Dr. Rachael Riddle for cardiology. He reports he was evaluated by cardiology prior to the surgical schedule day. Review of Systems   Respiratory: Negative for cough, sputum production and shortness of breath. Cardiovascular: Negative for chest pain, palpitations and leg swelling. Gastrointestinal: Negative for abdominal pain, constipation, diarrhea, nausea and vomiting. Musculoskeletal: Negative for myalgias. Neurological: Negative for dizziness and headaches. Psychiatric/Behavioral: Positive for depression ( Patient has a history of depression, notes he takes Zoloft daily). No Known Allergies    Current Outpatient Medications   Medication Sig Dispense Refill    tamsulosin (FLOMAX) 0.4 mg capsule Take 1 Cap by mouth daily. 90 Cap 3    amLODIPine (NORVASC) 5 mg tablet Take 1 Tab by mouth daily. 90 Tab 1    isosorbide mononitrate ER (IMDUR) 60 mg CR tablet Take 1 Tab by mouth every morning.  90 Tab 1    rosuvastatin (CRESTOR) 10 mg tablet TAKE 1 TABLET NIGHTLY 90 Tab 4    nitroglycerin (NITROLINGUAL) 400 mcg/spray spray 1 Spray by SubLINGual route every five (5) minutes as needed for Chest Pain for up to 3 doses. 1 Bottle 1    nortriptyline (PAMELOR) 25 mg capsule Take 1 Cap by mouth nightly. Indications: headaches 90 Cap 3    esomeprazole (NEXIUM) 40 mg capsule Take 1 Cap by mouth daily. Indications: gastroesophageal reflux disease 90 Cap 4    sertraline (ZOLOFT) 50 mg tablet Take 1 Tab by mouth daily. 90 Tab 4    aspirin delayed-release 81 mg tablet Take  by mouth daily.  multivitamin (ONE A DAY) tablet Take 1 Tab by mouth daily.  cpap machine kit by Does Not Apply route. CPAP at 11 cm with humidifier. Mask: Wisp nasal, large. Length of need 99 months. Replace mask and accessories as needed times 12 months. Download data at 30 days and fax at [phone]. Dx- DB on CPAP, RLS, Obesity 1 Kit 0    LUMIGAN 0.01 % ophthalmic drops Administer 1 Drop to both eyes.          Past Medical History:   Diagnosis Date    Back pain     CAD (coronary artery disease)     Chest pain     Coronary artery spasm (HCC)     GERD (gastroesophageal reflux disease)     Headache     migraines    Hypertension     Kidney disease     Kidney stone     Nausea     Sleep apnea     Sleep apnea     uses cpap    Sleep disorder     Vomiting        Past Surgical History:   Procedure Laterality Date    CARDIAC SURG PROCEDURE UNLIST      cardiac catheterization    COLONOSCOPY N/A 9/30/2016    COLONOSCOPY with [facility] ENDOSCOPY    HX BACK SURGERY      x3    HX COLONOSCOPY      HX ENDOSCOPY      HX HEENT      sinus sx    HX ORTHOPAEDIC      left shoulder, right knee, left thumb, left great toe sx    HX UROLOGICAL      Lithotripsy    DC COLSC FLX W/RMVL OF TUMOR POLYP LESION SNARE TQ N/A 09/30/2016    Dr. Lauren Neighbor History     Socioeconomic History    Marital status:  Spouse name: Not on file    Number of children: Not on file    Years of education: Not on file    Highest education level: Not on file   Occupational History    Not on file   Social Needs    Financial resource strain: Not on file    Food insecurity:     Worry: Not on file     Inability: Not on file    Transportation needs:     Medical: Not on file     Non-medical: Not on file   Tobacco Use    Smoking status: Never Smoker    Smokeless tobacco: Never Used   Substance and Sexual Activity    Alcohol use: Yes     Comment: rare use    Drug use: No    Sexual activity: Yes     Partners: Female   Lifestyle    Physical activity:     Days per week: Not on file     Minutes per session: Not on file    Stress: Not on file   Relationships    Social connections:     Talks on phone: Not on file     Gets together: Not on file     Attends Protestant service: Not on file     Active member of club or organization: Not on file     Attends meetings of clubs or organizations: Not on file     Relationship status: Not on file    Intimate partner violence:     Fear of current or ex partner: Not on file     Emotionally abused: Not on file     Physically abused: Not on file     Forced sexual activity: Not on file   Other Topics Concern    Not on file   Social History Narrative    ** Merged History Encounter **            Patient does have an advanced directive on file    Vitals:    05/20/19 1050   BP: 118/78   Pulse: 96   Resp: 16   Temp: 97.8 °F (36.6 °C)   TempSrc: Tympanic   SpO2: 96%   Weight: 248 lb (112.5 kg)   Height: 5' 9\" (1.753 m)   PainSc:   0 - No pain       Physical Exam   Constitutional: He is oriented to person, place, and time. No distress. HENT:   Right Ear: External ear normal.   Left Ear: External ear normal.   Mouth/Throat: No oropharyngeal exudate. Cardiovascular: Normal rate, regular rhythm and normal heart sounds. No murmur heard. Abdominal: Soft.  Bowel sounds are normal.   Musculoskeletal: Normal range of motion. He exhibits no edema or tenderness. Lymphadenopathy:     He has no cervical adenopathy. Neurological: He is alert and oriented to person, place, and time. No cranial nerve deficit. Gait normal.   Skin: Skin is warm and dry. Psychiatric: Affect normal.   Vitals reviewed. Office Visit on 03/25/2019   Component Date Value Ref Range Status    Color (UA POC) 03/25/2019 Yellow   Final    Clarity (UA POC) 03/25/2019 Clear   Final    Glucose (UA POC) 03/25/2019 Negative  Negative Final    Bilirubin (UA POC) 03/25/2019 Negative  Negative Final    Ketones (UA POC) 03/25/2019 Negative  Negative Final    Specific gravity (UA POC) 03/25/2019 1.020  1.001 - 1.035 Final    Blood (UA POC) 03/25/2019 Negative  Negative Final    pH (UA POC) 03/25/2019 6.0  4.6 - 8.0 Final    Protein (UA POC) 03/25/2019 Negative  Negative Final    Urobilinogen (UA POC) 03/25/2019 0.2 mg/dL  0.2 - 1 Final    Nitrites (UA POC) 03/25/2019 Negative  Negative Final    Leukocyte esterase (UA POC) 03/25/2019 Negative  Negative Final       .No results found for any visits on 05/20/19. Assessment / Plan:      ICD-10-CM ICD-9-CM    1. Preop examination Z01.818 V72.84    2. Essential hypertension with goal blood pressure less than 140/90 I10 401.9 CBC WITH AUTOMATED DIFF      METABOLIC PANEL, COMPREHENSIVE   3. DB on CPAP G47.33 327.23     Z99.89 V46.8    4. Gastroesophageal reflux disease without esophagitis K21.9 530.81    5. Hyperlipidemia, unspecified hyperlipidemia type E78.5 272.4 LIPID PANEL   6. Prediabetes R73.03 790.29    7. Recurrent depression (HCC) F33.9 296.30    8. Severe obesity (BMI 35.0-39. 9) with comorbidity ([facility] [place] [address] E66.01 278.01 TSH 3RD GENERATION   9. Encounter for vitamin deficiency screening Z13.21 V77.99 VITAMIN D, 25 HYDROXY     Preoperative examination-patient is scheduled for surgery on May 28 with Dr. Brooke Dickinson. Patient is optimized pending lab results.   Hypertension-blood pressure is controlled today no changes to current treatment plan  Hyperlipidemia lipid panel ordered  Prediabetes-CMP ordered today  Will screen patient for vitamin D deficiency      Follow-up and Dispositions    · Return in about 3 months (around 8/20/2019). I asked the patient if he  had any questions and answered his  questions. The patient stated that he understands the treatment plan and agrees with the treatment plan    This document was created with a voice activated dictation system and may contain transcription errors.

## 2019-05-20 NOTE — PROGRESS NOTES
Chief Complaint   Patient presents with    Pre-op Exam     Right Elbow Common Extensor tendon repair    Establish Care         Is someone accompanying this pt? no    Is the patient using any DME equipment during OV? no    Depression Screening:  3 most recent PHQ Screens 2/19/2019   Little interest or pleasure in doing things Not at all   Feeling down, depressed, irritable, or hopeless Not at all   Total Score PHQ 2 0       Learning Assessment:  Learning Assessment 3/7/2017 8/4/2016 4/28/2016   PRIMARY LEARNER Patient Patient Patient   HIGHEST LEVEL OF EDUCATION - PRIMARY LEARNER  GRADUATED HIGH SCHOOL OR GED - -   BARRIERS PRIMARY LEARNER - - NONE   CO-LEARNER CAREGIVER - - No   PRIMARY LANGUAGE ENGLISH ENGLISH ENGLISH   LEARNER PREFERENCE PRIMARY READING LISTENING LISTENING   ANSWERED BY self patient patient   RELATIONSHIP SELF SELF SELF       Abuse Screening:  Abuse Screening Questionnaire 8/28/2018   Do you ever feel afraid of your partner? N   Are you in a relationship with someone who physically or mentally threatens you? N   Is it safe for you to go home? Y       Fall Risk  No flowsheet data found. Health Maintenance reviewed and discussed per provider. Pt is due for   Health Maintenance Due   Topic    DTaP/Tdap/Td series (1 - Tdap)    Shingrix Vaccine Age 50> (1 of 2)    Please order/place referral if appropriate. Pt currently taking Antiplatelet therapy? no      Coordination of Care:  1. Have you been to the ER, urgent care clinic since your last visit? Hospitalized since your last visit? no    2. Have you seen or consulted any other health care providers outside of the [address] [place] [address] since your last visit? Include any pap smears or colon screening. yes    Please see Red banners under Allergies, Med rec, Immunizations to remove outside inquires. All correct information has been verified with patient and added to chart.

## 2019-05-21 LAB — 25(OH)D3 SERPL-MCNC: 21.8 NG/ML (ref 30–100)

## 2019-05-22 ENCOUNTER — TELEPHONE (OUTPATIENT)
Dept: FAMILY MEDICINE CLINIC | Facility: CLINIC | Age: 54
End: 2019-05-22

## 2019-05-22 ENCOUNTER — HOSPITAL ENCOUNTER (OUTPATIENT)
Dept: LAB | Age: 54
Discharge: HOME | End: 2019-05-22
Payer: COMMERCIAL

## 2019-05-22 DIAGNOSIS — Z01.818 PRE-OP EVALUATION: ICD-10-CM

## 2019-05-22 LAB
ATRIAL RATE: 81 BPM
CALCULATED P AXIS, ECG09: 15 DEGREES
CALCULATED R AXIS, ECG10: 1 DEGREES
CALCULATED T AXIS, ECG11: 30 DEGREES
DIAGNOSIS, 93000: NORMAL
P-R INTERVAL, ECG05: 130 MS
Q-T INTERVAL, ECG07: 378 MS
QRS DURATION, ECG06: 84 MS
QTC CALCULATION (BEZET), ECG08: 439 MS
VENTRICULAR RATE, ECG03: 81 BPM

## 2019-05-22 PROCEDURE — 93005 ELECTROCARDIOGRAM TRACING: CPT

## 2019-05-23 DIAGNOSIS — K21.9 GASTROESOPHAGEAL REFLUX DISEASE WITHOUT ESOPHAGITIS: ICD-10-CM

## 2019-05-23 RX ORDER — ESOMEPRAZOLE MAGNESIUM 40 MG/1
40 CAPSULE, DELAYED RELEASE ORAL DAILY
Qty: 90 CAP | Refills: 1 | Status: SHIPPED | OUTPATIENT
Start: 2019-05-23 | End: 2019-09-03 | Stop reason: CLARIF

## 2019-05-23 NOTE — TELEPHONE ENCOUNTER
Requested Prescriptions     Pending Prescriptions Disp Refills    esomeprazole (NEXIUM) 40 mg capsule 90 Cap 3     Sig: Take 1 Cap by mouth daily.  Indications: gastroesophageal reflux disease

## 2019-05-28 NOTE — PROGRESS NOTES
Please notify patient that lab results were reviewed and the results are okay except his vitamin D is insufficient. Please have him take vitamin D over-the-counter.

## 2019-06-13 DIAGNOSIS — R51.9 CHRONIC DAILY HEADACHE: ICD-10-CM

## 2019-06-13 DIAGNOSIS — F32.4 MAJOR DEPRESSIVE DISORDER WITH SINGLE EPISODE, IN PARTIAL REMISSION (HCC): ICD-10-CM

## 2019-06-18 RX ORDER — NORTRIPTYLINE HYDROCHLORIDE 25 MG/1
25 CAPSULE ORAL
Qty: 90 CAP | Refills: 1 | Status: SHIPPED | OUTPATIENT
Start: 2019-06-18 | End: 2020-01-23 | Stop reason: SDUPTHER

## 2019-06-18 RX ORDER — SERTRALINE HYDROCHLORIDE 50 MG/1
50 TABLET, FILM COATED ORAL DAILY
Qty: 90 TAB | Refills: 1 | Status: SHIPPED | OUTPATIENT
Start: 2019-06-18 | End: 2019-10-19 | Stop reason: SDUPTHER

## 2019-06-28 RX ORDER — OMEPRAZOLE 40 MG/1
40 CAPSULE, DELAYED RELEASE ORAL DAILY
Qty: 90 CAP | Refills: 1 | OUTPATIENT
Start: 2019-06-28

## 2019-08-16 DIAGNOSIS — I25.111 CORONARY ARTERY DISEASE INVOLVING NATIVE CORONARY ARTERY OF NATIVE HEART WITH ANGINA PECTORIS WITH DOCUMENTED SPASM (HCC): ICD-10-CM

## 2019-08-19 RX ORDER — ISOSORBIDE MONONITRATE 60 MG/1
TABLET, EXTENDED RELEASE ORAL
Qty: 90 TAB | Refills: 1 | Status: SHIPPED | OUTPATIENT
Start: 2019-08-19 | End: 2020-01-07

## 2019-09-03 ENCOUNTER — TELEPHONE (OUTPATIENT)
Dept: FAMILY MEDICINE CLINIC | Facility: CLINIC | Age: 54
End: 2019-09-03

## 2019-09-03 RX ORDER — OMEPRAZOLE 40 MG/1
40 CAPSULE, DELAYED RELEASE ORAL DAILY
Qty: 90 CAP | Refills: 1 | Status: SHIPPED | OUTPATIENT
Start: 2019-09-03 | End: 2020-01-23 | Stop reason: SDUPTHER

## 2019-09-03 NOTE — TELEPHONE ENCOUNTER
2 pt. Identifiers confirmed. Called to verify pt needs to change Rx of nexium to omeprazole because of insurance coverage.    Send to [address] [place]   [address]  [facility] [address]  Phone: [phone] Fax: [phone]

## 2019-09-06 NOTE — TELEPHONE ENCOUNTER
2 pt identifiers confirmed. Pt was informed that his prescription was sent to his pharmacy. Pt verbalized his understanding.

## 2019-10-07 ENCOUNTER — OFFICE VISIT (OUTPATIENT)
Dept: CARDIOLOGY CLINIC | Age: 54
End: 2019-10-07

## 2019-10-07 VITALS
SYSTOLIC BLOOD PRESSURE: 116 MMHG | WEIGHT: 249 LBS | HEIGHT: 69 IN | BODY MASS INDEX: 36.88 KG/M2 | HEART RATE: 88 BPM | DIASTOLIC BLOOD PRESSURE: 74 MMHG

## 2019-10-07 DIAGNOSIS — E78.5 HYPERLIPIDEMIA, UNSPECIFIED HYPERLIPIDEMIA TYPE: ICD-10-CM

## 2019-10-07 DIAGNOSIS — G47.33 OSA ON CPAP: ICD-10-CM

## 2019-10-07 DIAGNOSIS — I25.111 CORONARY ARTERY DISEASE INVOLVING NATIVE CORONARY ARTERY OF NATIVE HEART WITH ANGINA PECTORIS WITH DOCUMENTED SPASM (HCC): Primary | ICD-10-CM

## 2019-10-07 DIAGNOSIS — I10 ESSENTIAL HYPERTENSION WITH GOAL BLOOD PRESSURE LESS THAN 140/90: ICD-10-CM

## 2019-10-07 DIAGNOSIS — Z99.89 OSA ON CPAP: ICD-10-CM

## 2019-10-07 NOTE — PROGRESS NOTES
1. Have you been to the ER, urgent care clinic since your last visit? Hospitalized since your last visit? No    2. Have you seen or consulted any other health care providers outside of the [address] [place] [address] since your last visit? Include any pap smears or colon screening.  No

## 2019-10-11 DIAGNOSIS — I10 ESSENTIAL HYPERTENSION WITH GOAL BLOOD PRESSURE LESS THAN 140/90: ICD-10-CM

## 2019-10-11 RX ORDER — AMLODIPINE BESYLATE 5 MG/1
TABLET ORAL
Qty: 90 TAB | Refills: 1 | Status: SHIPPED | OUTPATIENT
Start: 2019-10-11 | End: 2020-02-14 | Stop reason: ALTCHOICE

## 2019-11-01 RX ORDER — ROSUVASTATIN CALCIUM 10 MG/1
TABLET, COATED ORAL
Qty: 30 TAB | Refills: 1 | Status: SHIPPED | OUTPATIENT
Start: 2019-11-01 | End: 2020-01-23 | Stop reason: SDUPTHER

## 2019-11-01 NOTE — TELEPHONE ENCOUNTER
Requested Prescriptions     Pending Prescriptions Disp Refills    rosuvastatin (CRESTOR) 10 mg tablet 90 Tab 4

## 2019-12-10 ENCOUNTER — HOSPITAL ENCOUNTER (OUTPATIENT)
Age: 54
Discharge: HOME | End: 2019-12-10
Attending: ORTHOPAEDIC SURGERY
Payer: COMMERCIAL

## 2019-12-10 DIAGNOSIS — M17.12 PRIMARY OSTEOARTHRITIS OF LEFT KNEE: ICD-10-CM

## 2019-12-10 DIAGNOSIS — M17.11 PRIMARY OSTEOARTHRITIS OF RIGHT KNEE: ICD-10-CM

## 2019-12-10 PROCEDURE — 73721 MRI JNT OF LWR EXTRE W/O DYE: CPT

## 2020-01-06 DIAGNOSIS — I25.111 CORONARY ARTERY DISEASE INVOLVING NATIVE CORONARY ARTERY OF NATIVE HEART WITH ANGINA PECTORIS WITH DOCUMENTED SPASM (HCC): ICD-10-CM

## 2020-01-07 RX ORDER — ISOSORBIDE MONONITRATE 60 MG/1
TABLET, EXTENDED RELEASE ORAL
Qty: 90 TAB | Refills: 1 | Status: SHIPPED | OUTPATIENT
Start: 2020-01-07 | End: 2020-02-10 | Stop reason: DRUGHIGH

## 2020-01-15 DIAGNOSIS — F32.4 MAJOR DEPRESSIVE DISORDER WITH SINGLE EPISODE, IN PARTIAL REMISSION (HCC): ICD-10-CM

## 2020-01-15 NOTE — TELEPHONE ENCOUNTER
Requested Prescriptions     Pending Prescriptions Disp Refills    sertraline (ZOLOFT) 50 mg tablet 90 Tab 0

## 2020-01-16 RX ORDER — SERTRALINE HYDROCHLORIDE 50 MG/1
TABLET, FILM COATED ORAL
Qty: 90 TAB | Refills: 0 | Status: SHIPPED | OUTPATIENT
Start: 2020-01-16 | End: 2020-01-23 | Stop reason: SDUPTHER

## 2020-01-23 ENCOUNTER — OFFICE VISIT (OUTPATIENT)
Dept: FAMILY MEDICINE CLINIC | Age: 55
End: 2020-01-23

## 2020-01-23 VITALS
TEMPERATURE: 98.5 F | HEIGHT: 69 IN | OXYGEN SATURATION: 96 % | RESPIRATION RATE: 16 BRPM | DIASTOLIC BLOOD PRESSURE: 82 MMHG | HEART RATE: 75 BPM | SYSTOLIC BLOOD PRESSURE: 120 MMHG | WEIGHT: 251 LBS | BODY MASS INDEX: 37.18 KG/M2

## 2020-01-23 DIAGNOSIS — K21.9 GASTROESOPHAGEAL REFLUX DISEASE WITHOUT ESOPHAGITIS: ICD-10-CM

## 2020-01-23 DIAGNOSIS — N20.0 KIDNEY STONE: ICD-10-CM

## 2020-01-23 DIAGNOSIS — E55.9 VITAMIN D DEFICIENCY: ICD-10-CM

## 2020-01-23 DIAGNOSIS — E66.9 OBESITY WITH SERIOUS COMORBIDITY, UNSPECIFIED CLASSIFICATION, UNSPECIFIED OBESITY TYPE: ICD-10-CM

## 2020-01-23 DIAGNOSIS — F32.4 MAJOR DEPRESSIVE DISORDER WITH SINGLE EPISODE, IN PARTIAL REMISSION (HCC): ICD-10-CM

## 2020-01-23 DIAGNOSIS — Z99.89 OSA ON CPAP: ICD-10-CM

## 2020-01-23 DIAGNOSIS — R73.03 PREDIABETES: ICD-10-CM

## 2020-01-23 DIAGNOSIS — R51.9 CHRONIC DAILY HEADACHE: ICD-10-CM

## 2020-01-23 DIAGNOSIS — I10 ESSENTIAL HYPERTENSION WITH GOAL BLOOD PRESSURE LESS THAN 140/90: Primary | ICD-10-CM

## 2020-01-23 DIAGNOSIS — N40.0 BENIGN PROSTATIC HYPERPLASIA, UNSPECIFIED WHETHER LOWER URINARY TRACT SYMPTOMS PRESENT: ICD-10-CM

## 2020-01-23 DIAGNOSIS — E78.5 HYPERLIPIDEMIA, UNSPECIFIED HYPERLIPIDEMIA TYPE: ICD-10-CM

## 2020-01-23 DIAGNOSIS — G47.33 OSA ON CPAP: ICD-10-CM

## 2020-01-23 RX ORDER — SERTRALINE HYDROCHLORIDE 100 MG/1
TABLET, FILM COATED ORAL
Qty: 90 TAB | Refills: 0 | Status: SHIPPED | OUTPATIENT
Start: 2020-01-23 | End: 2020-03-17

## 2020-01-23 RX ORDER — LATANOPROST 50 UG/ML
1 SOLUTION/ DROPS OPHTHALMIC DAILY
Refills: 1 | COMMUNITY
Start: 2019-10-24

## 2020-01-23 RX ORDER — ROSUVASTATIN CALCIUM 10 MG/1
TABLET, COATED ORAL
Qty: 90 TAB | Refills: 1 | Status: SHIPPED | OUTPATIENT
Start: 2020-01-23 | End: 2020-06-27

## 2020-01-23 RX ORDER — NORTRIPTYLINE HYDROCHLORIDE 25 MG/1
25 CAPSULE ORAL
Qty: 90 CAP | Refills: 1 | Status: SHIPPED | OUTPATIENT
Start: 2020-01-23 | End: 2020-08-11

## 2020-01-23 RX ORDER — OMEPRAZOLE 40 MG/1
40 CAPSULE, DELAYED RELEASE ORAL DAILY
Qty: 90 CAP | Refills: 1 | Status: SHIPPED | OUTPATIENT
Start: 2020-01-23 | End: 2020-07-27

## 2020-01-23 NOTE — PATIENT INSTRUCTIONS
A Healthy Lifestyle: Care Instructions Your Care Instructions A healthy lifestyle can help you feel good, stay at a healthy weight, and have plenty of energy for both work and play. A healthy lifestyle is something you can share with your whole family. A healthy lifestyle also can lower your risk for serious health problems, such as high blood pressure, heart disease, and diabetes. You can follow a few steps listed below to improve your health and the health of your family. Follow-up care is a key part of your treatment and safety. Be sure to make and go to all appointments, and call your doctor if you are having problems. It's also a good idea to know your test results and keep a list of the medicines you take. How can you care for yourself at home? · Do not eat too much sugar, fat, or fast foods. You can still have dessert and treats now and then. The goal is moderation. · Start small to improve your eating habits. Pay attention to portion sizes, drink less juice and soda pop, and eat more fruits and vegetables. ? Eat a healthy amount of food. A 3-ounce serving of meat, for example, is about the size of a deck of cards. Fill the rest of your plate with vegetables and whole grains. ? Limit the amount of soda and sports drinks you have every day. Drink more water when you are thirsty. ? Eat at least 5 servings of fruits and vegetables every day. It may seem like a lot, but it is not hard to reach this goal. A serving or helping is 1 piece of fruit, 1 cup of vegetables, or 2 cups of leafy, raw vegetables. Have an apple or some carrot sticks as an afternoon snack instead of a candy bar.  Try to have fruits and/or vegetables at every meal. 
 · Make exercise part of your daily routine. You may want to start with simple activities, such as walking, bicycling, or slow swimming. Try to be active 30 to 60 minutes every day. You do not need to do all 30 to 60 minutes all at once. For example, you can exercise 3 times a day for 10 or 20 minutes. Moderate exercise is safe for most people, but it is always a good idea to talk to your doctor before starting an exercise program. 
· Keep moving. Tonny Dais the lawn, work in the garden, or [employer]. Take the stairs instead of the elevator at work. · If you smoke, quit. People who smoke have an increased risk for heart attack, stroke, cancer, and other lung illnesses. Quitting is hard, but there are ways to boost your chance of quitting tobacco for good. ? Use nicotine gum, patches, or lozenges. ? Ask your doctor about stop-smoking programs and medicines. ? Keep trying. In addition to reducing your risk of diseases in the future, you will notice some benefits soon after you stop using tobacco. If you have shortness of breath or asthma symptoms, they will likely get better within a few weeks after you quit. · Limit how much alcohol you drink. Moderate amounts of alcohol (up to 2 drinks a day for men, 1 drink a day for women) are okay. But drinking too much can lead to liver problems, high blood pressure, and other health problems. Family health If you have a family, there are many things you can do together to improve your health. · Eat meals together as a family as often as possible. · Eat healthy foods. This includes fruits, vegetables, lean meats and dairy, and whole grains. · Include your family in your fitness plan. Most people think of activities such as jogging or tennis as the way to fitness, but there are many ways you and your family can be more active. Anything that makes you breathe hard and gets your heart pumping is exercise. Here are some tips: ? Walk to do errands or to take your child to school or the bus. 
? Go for a family bike ride after dinner instead of watching TV. Where can you learn more? Go to http://sandra-ramana.info/. Enter Y096 in the search box to learn more about \"A Healthy Lifestyle: Care Instructions. \" Current as of: May 28, 2019 Content Version: 12.2 © 1279-2081 [employer]. Care instructions adapted under license by [employer] (which disclaims liability or warranty for this information). If you have questions about a medical condition or this instruction, always ask your healthcare professional. [address] any warranty or liability for your use of this information.

## 2020-01-23 NOTE — PROGRESS NOTES
1. Have you been to the ER, urgent care clinic since your last visit? Hospitalized since your last visit? No    2. Have you seen or consulted any other health care providers outside of the [address] [place] [address] since your last visit? Include any pap smears or colon screening.  No

## 2020-01-24 NOTE — PROGRESS NOTES
SUBJECTIVE  Chief Complaint   Patient presents with    New Patient    Hypertension     Patient presents to establish care. He is transitioning from his prior provider who left the medical group at [facility]. He has no acute concerns. His care needs are chronic today and here for care planning, med refills, labs, and referrals. He has metabolic disease in the way of hypertension, prediabetes, hyperlipidemia in the setting of obesity. He has no side effects on the medications that he is on. He reports compliance. He is not involved in a diet and exercise program.      He has depression that is not fully controlled on Zoloft. He has been on a dose of 50mg for many years he says. He has GERD controlled on PPIs. He has had colorectal cancer screening in 2016. He has sleep apnea and chronic daily headaches. He is on CPAP. He needs to find a new sleep doctor. He has BPH and a family history of prostate CA. He has had kidney stones as well. He is due soon to see urology. He needs a new referral.     Past Medical History:   Diagnosis Date    Back pain     CAD (coronary artery disease)     Coronary artery spasm (HCC)     GERD (gastroesophageal reflux disease)     Hypertension     Kidney stone     Migraine     migraines    Prediabetes     Sleep apnea     uses cpap       Current Outpatient Medications:     latanoprost (XALATAN) 0.005 % ophthalmic solution, Administer 1 Drop to both eyes daily. , Disp: , Rfl: 1    sertraline (ZOLOFT) 100 mg tablet, TAKE 1 TABLET BY MOUTH  DAILY, Disp: 90 Tab, Rfl: 0    nortriptyline (PAMELOR) 25 mg capsule, Take 1 Cap by mouth nightly. Indications: headaches, Disp: 90 Cap, Rfl: 1    rosuvastatin (CRESTOR) 10 mg tablet, TAKE 1 TABLET NIGHTLY, Disp: 90 Tab, Rfl: 1    omeprazole (PRILOSEC) 40 mg capsule, Take 1 Cap by mouth daily. , Disp: 90 Cap, Rfl: 1    isosorbide mononitrate ER (IMDUR) 60 mg CR tablet, TAKE 1 TABLET BY MOUTH EVERY MORNING, Disp: 90 Tab, Rfl: 1    amLODIPine (NORVASC) 5 mg tablet, TAKE 1 TABLET BY MOUTH  DAILY, Disp: 90 Tab, Rfl: 1    tamsulosin (FLOMAX) 0.4 mg capsule, Take 1 Cap by mouth daily. , Disp: 90 Cap, Rfl: 3    nitroglycerin (NITROLINGUAL) 400 mcg/spray spray, 1 [place] by SubLINGual route every five (5) minutes as needed for Chest Pain for up to 3 doses. , Disp: 1 Bottle, Rfl: 1    aspirin delayed-release 81 mg tablet, Take  by mouth daily. , Disp: , Rfl:     multivitamin (ONE A DAY) tablet, Take 1 Tab by mouth daily. , Disp: , Rfl:     cpap machine kit, by Does Not Apply route. CPAP at 11 cm with humidifier. Mask: Wisp nasal, large. Length of need 99 months. Replace mask and accessories as needed times 12 months. Download data at 30 days and fax at [phone].  Dx- DB on CPAP, RLS, Obesity, Disp: 1 Kit, Rfl: 0    No Known Allergies    Past Surgical History:   Procedure Laterality Date    CARDIAC SURG PROCEDURE UNLIST      cardiac catheterization    COLONOSCOPY N/A 9/30/2016    COLONOSCOPY with biopsy performed by Yamilka Laguna MD at [facility] ENDOSCOPY    HX BACK SURGERY      x3    HX ENDOSCOPY      reportedly normal by patient    HX HEENT      sinus sx    HX ORTHOPAEDIC      left shoulder, right knee, left thumb, left great toe sx    HX UROLOGICAL      Lithotripsy     Social History     Tobacco Use    Smoking status: Never Smoker    Smokeless tobacco: Never Used   Substance Use Topics    Alcohol use: Yes     Frequency: Monthly or less     Drinks per session: 1 or 2     Binge frequency: Never     Comment: rare use    Drug use: No     Family History   Problem Relation Age of Onset    Hypertension Mother     Diabetes Mother     Hypertension Father     Cancer Father         Prostate    Heart Disease Father     Heart Attack Father     Stroke Brother     Migraines Brother     Cancer Sister         Breast    Migraines Sister     Stroke Brother      ROS:  Complete 10 system ROS is obtained from the patient intake forms which are reviewed with the patient. The intake H&P is scanned in for the chart. OBJECTIVE    Blood pressure 120/82, pulse 75, temperature 98.5 °F (36.9 °C), temperature source Oral, resp. rate 16, height 5' 9\" (1.753 m), weight 251 lb (113.9 kg), SpO2 96 %. General:  Alert, cooperative, well appearing, in no apparent distress. CV:  The heart sounds are regular in rate and rhythm. There is a normal S1 and S2. There or no murmurs  Lungs: Inspiratory and expiratory efforts are full and unlabored. Lung sounds are clear and equal to auscultation throughout all lung fields without wheezing, rales, or rhonchi. Skin:  No rashes, no jaundice. Psych: normal affect. Mood good. Oriented x 3. Judgement and insight intact. ASSESSMENT / PLAN    ICD-10-CM ICD-9-CM    1. Essential hypertension with goal blood pressure less than 140/90 I10 401.9 CBC W/O DIFF      METABOLIC PANEL, COMPREHENSIVE      LIPID PANEL   2. Prediabetes R73.03 790.29 HEMOGLOBIN A1C W/O EAG   3. Hyperlipidemia, unspecified hyperlipidemia type E78.5 272.4 rosuvastatin (CRESTOR) 10 mg tablet      CBC W/O DIFF      METABOLIC PANEL, COMPREHENSIVE      LIPID PANEL   4. Obesity with serious comorbidity, unspecified classification, unspecified obesity type E66.9 278.00    5. Major depressive disorder with single episode, in partial remission (HCC) F32.4 296.25 sertraline (ZOLOFT) 100 mg tablet   6. Gastroesophageal reflux disease without esophagitis K21.9 530.81 CBC W/O DIFF      METABOLIC PANEL, COMPREHENSIVE      omeprazole (PRILOSEC) 40 mg capsule   7. Chronic daily headache R51 784.0 nortriptyline (PAMELOR) 25 mg capsule   8. DB on CPAP G47.33 327.23 REFERRAL TO SLEEP STUDIES    Z99.89 V46.8    9. Benign prostatic hyperplasia, unspecified whether lower urinary tract symptoms present N40.0 600.00 REFERRAL TO UROLOGY   10. Kidney stone N20.0 592.0 REFERRAL TO UROLOGY      METABOLIC PANEL, COMPREHENSIVE   11. Vitamin D deficiency E55.9 268.9 VITAMIN D, 25 HYDROXY     HTN / prediabetes / hyperlipidemia / obesity - cont current care. Due for labs in May. Diet and exercise. Pt ed handouts. Depression - uncontrolled. Increase to Zoloft 100mg daily. Chronic daily HAs / DB - refer back to sleep specialist, also specializing in neurology. BPH / kidney stones - reviewed urology notes. Refer back to urology. Vit D def - supplementation, check levels. 25 minutes of face to face time spent with the patient with at least 50% on counseling on above medical issues. All chart history elements were reviewed by me at the time of the visit even though marked at time of note closure. Patient understands our medical plan. Patient has provided input and agrees with goals. Alternatives have been explained and offered. All questions answered. The patient is to call if condition worsens or fails to improve. Follow-up and Dispositions    · Return in about 4 months (around 5/23/2020) for physical. Fasting labs due 3-7 days prior to appointment.

## 2020-01-28 ENCOUNTER — TELEPHONE (OUTPATIENT)
Dept: FAMILY MEDICINE CLINIC | Age: 55
End: 2020-01-28

## 2020-01-28 NOTE — TELEPHONE ENCOUNTER
Please let patient know to save my labs for prior to his May appt and to do all testing that the surgeon orders.

## 2020-01-28 NOTE — TELEPHONE ENCOUNTER
Pt is having right knee total replacement on 4/9/2020. They would like to have an appt around the 2nd or 3rd week of march. Please call [facility] [place] at your earliest convenience.

## 2020-01-29 NOTE — TELEPHONE ENCOUNTER
Spoke with Kady. Patient has been scheduled for Thursday, April 02, 2020 09:00 AM.  N[facility] Room aware that patient will need cardiac clearance.

## 2020-02-10 ENCOUNTER — OFFICE VISIT (OUTPATIENT)
Dept: FAMILY MEDICINE CLINIC | Age: 55
End: 2020-02-10

## 2020-02-10 VITALS
RESPIRATION RATE: 16 BRPM | DIASTOLIC BLOOD PRESSURE: 86 MMHG | OXYGEN SATURATION: 97 % | SYSTOLIC BLOOD PRESSURE: 128 MMHG | HEART RATE: 74 BPM | TEMPERATURE: 98.5 F | HEIGHT: 69 IN | WEIGHT: 241 LBS | BODY MASS INDEX: 35.7 KG/M2

## 2020-02-10 DIAGNOSIS — R55 SYNCOPE, UNSPECIFIED SYNCOPE TYPE: Primary | ICD-10-CM

## 2020-02-10 RX ORDER — ISOSORBIDE MONONITRATE 60 MG/1
60 TABLET, EXTENDED RELEASE ORAL DAILY
COMMUNITY
End: 2021-07-01

## 2020-02-10 NOTE — PROGRESS NOTES
SUBJECTIVE  Chief Complaint   Patient presents with    Loss of Consciousness     x 1 week ago; LOC at [address] last Sunday; did not hit head but did injure back     Patient is a 62year old male who presents for follow up after hospital admission from 2/2 to 2/4/20 for syncope. Pt was standing in line at [address] when he became lightheaded and dizzy, tried to stabilize himself but woke up a few seconds later on the floor and diaphoretic. The episode was witnessed, he denies hitting his head or having seizure-like activity. Heart rate was normal immediately following the episode. He has had multiple similar episodes in the past, but this is the first time he has had syncope. He had a positive tilt table test in 2013, in which he felt nauseous and lightheaded with a heart rate of 44. He says that he has had a heart monitor in the past but does not recall having any episodes on the monitor. He says he has had EEGs before that were negative. During his hospital admission, an EKG, CT head, orthostatic BPs, and TSH were normal. An echocardiogram found no significant valvular pathology and carotid PVLs demonstrated no evidence of stenosis. He was discharged following his workup with instructions to decrease his Imdur dose from 60mg to 30mg. Since his discharge, he began wearing compression stockings up to his thighs as directed by his cardiologist following the tilt table test. Pt since experienced one additional episode of lightheadedness while standing that resolved after a few minutes by sitting down. He currently denies any chest pain or difficulty breathing. OBJECTIVE    Blood pressure 128/86, pulse 74, temperature 98.5 °F (36.9 °C), temperature source Oral, resp. rate 16, height 5' 9\" (1.753 m), weight 241 lb (109.3 kg), SpO2 97 %. General:  Alert, cooperative, well appearing, in no apparent distress. CV:  The heart sounds are regular in rate and rhythm. There is a normal S1 and S2.   There or no murmurs  Lungs: Inspiratory and expiratory efforts are full and unlabored. Lung sounds are clear and equal to auscultation throughout all lung fields without wheezing, rales, or rhonchi. Neuro: No deficits. CN2-12 intact. Psych: normal affect. Mood good. Oriented x 3. Judgement and insight intact. ASSESSMENT / PLAN    ICD-10-CM ICD-9-CM    1. Syncope, unspecified syncope type R55 780.2      Reviewed hospital discharge summary. Performed med rec to change Imdur dose. Advised he sees cardiology to discuss next steps like possibly an event monitor. 15 minutes of face to face time spent with the patient with at least 50% on counseling on above medical issues. All chart history elements were reviewed by me at the time of the visit even though marked at time of note closure. Patient understands our medical plan. Patient has provided input and agrees with goals. Alternatives have been explained and offered. All questions answered. The patient is to call if condition worsens or fails to improve. Follow-up and Dispositions    · Return as scheduled.

## 2020-02-10 NOTE — PATIENT INSTRUCTIONS
Lightheadedness or Faintness: Care Instructions  Your Care Instructions  Lightheadedness is a feeling that you are about to faint or \"pass out. \" You do not feel as if you or your surroundings are moving. It is different from vertigo, which is the feeling that you or things around you are spinning or tilting. Lightheadedness usually goes away or gets better when you lie down. If lightheadedness gets worse, it can lead to a fainting spell. It is common to feel lightheaded from time to time. Lightheadedness usually is not caused by a serious problem. It often is caused by a short-lasting drop in blood pressure and blood flow to your head that occurs when you get up too quickly from a seated or lying position. Follow-up care is a key part of your treatment and safety. Be sure to make and go to all appointments, and call your doctor if you are having problems. It's also a good idea to know your test results and keep a list of the medicines you take. How can you care for yourself at home? · Lie down for 1 or 2 minutes when you feel lightheaded. After lying down, sit up slowly and remain sitting for 1 to 2 minutes before slowly standing up. · Avoid movements, positions, or activities that have made you lightheaded in the past.  · Get plenty of rest, especially if you have a cold or flu, which can cause lightheadedness. · Make sure you drink plenty of fluids, especially if you have a fever or have been sweating. · Do not drive or put yourself and others in danger while you feel lightheaded. When should you call for help? Call 911 anytime you think you may need emergency care. For example, call if:    · You have symptoms of a stroke. These may include:  ? Sudden numbness, tingling, weakness, or loss of movement in your face, arm, or leg, especially on only one side of your body. ? Sudden vision changes. ? Sudden trouble speaking. ? Sudden confusion or trouble understanding simple statements.   ? Sudden problems with walking or balance. ? A sudden, severe headache that is different from past headaches.     · You have symptoms of a heart attack. These may include:  ? Chest pain or pressure, or a strange feeling in the chest.  ? Sweating. ? Shortness of breath. ? Nausea or vomiting. ? Pain, pressure, or a strange feeling in the back, neck, jaw, or upper belly or in one or both shoulders or arms. ? Lightheadedness or sudden weakness. ? A fast or irregular heartbeat. After you call 911, the  may tell you to chew 1 adult-strength or 2 to 4 low-dose aspirin. Wait for an ambulance. Do not try to drive yourself.    Watch closely for changes in your health, and be sure to contact your doctor if:    · Your lightheadedness gets worse or does not get better with home care. Where can you learn more? Go to http://sandra-ramana.info/. Enter Y191 in the search box to learn more about \"Lightheadedness or Faintness: Care Instructions. \"  Current as of: June 26, 2019  Content Version: 12.2  © 5537-6240 [employer]. Care instructions adapted under license by [employer] (which disclaims liability or warranty for this information). If you have questions about a medical condition or this instruction, always ask your healthcare professional. [address] any warranty or liability for your use of this information.

## 2020-02-10 NOTE — PROGRESS NOTES
1. Have you been to the ER, urgent care clinic since your last visit? Hospitalized since your last visit? Yes Where: [facility]    2. Have you seen or consulted any other health care providers outside of the [address] [place] [address] since your last visit? Include any pap smears or colon screening. No    Imdur dosed decreased from 60 mg to 30 mg.

## 2020-02-14 ENCOUNTER — OFFICE VISIT (OUTPATIENT)
Dept: CARDIOLOGY CLINIC | Age: 55
End: 2020-02-14

## 2020-02-14 VITALS
HEART RATE: 90 BPM | WEIGHT: 248 LBS | BODY MASS INDEX: 36.73 KG/M2 | HEIGHT: 69 IN | DIASTOLIC BLOOD PRESSURE: 64 MMHG | SYSTOLIC BLOOD PRESSURE: 105 MMHG

## 2020-02-14 DIAGNOSIS — R55 VASOVAGAL SYNCOPE: ICD-10-CM

## 2020-02-14 DIAGNOSIS — E78.5 HYPERLIPIDEMIA, UNSPECIFIED HYPERLIPIDEMIA TYPE: ICD-10-CM

## 2020-02-14 DIAGNOSIS — I10 ESSENTIAL HYPERTENSION WITH GOAL BLOOD PRESSURE LESS THAN 140/90: ICD-10-CM

## 2020-02-14 DIAGNOSIS — I25.111 CORONARY ARTERY DISEASE INVOLVING NATIVE CORONARY ARTERY OF NATIVE HEART WITH ANGINA PECTORIS WITH DOCUMENTED SPASM (HCC): Primary | ICD-10-CM

## 2020-02-14 NOTE — PROGRESS NOTES
HISTORY OF PRESENT ILLNESS  Leidy Perdomo is a 62 y.o. male. Patient with cad,coronary spasm,htn  On follow up patient denies any sob, palpitation or other significant symptoms. 2/2020  Seen for follow-up after recent admission. Episode of syncope started while he was standing in line for longer time started with dizziness subsequent syncope. Occasional lightheaded when standing in 1 position. He had positive tilt table in 2013. No recent syncopal episodes. Hypertension   The history is provided by the patient. This is a chronic problem. The problem occurs constantly. The problem has not changed since onset. Associated symptoms include chest pain. Cholesterol Problem   The history is provided by the patient. This is a chronic problem. The problem occurs constantly. The problem has not changed since onset. Associated symptoms include chest pain. Chest Pain (Angina)    The history is provided by the patient. This is a recurrent problem. The problem has been gradually worsening. The problem occurs every several days. The pain is associated with exertion and rest. The pain is present in the substernal region. The pain is mild. The quality of the pain is described as pressure-like. The pain does not radiate. Pertinent negatives include no claudication, no cough, no dizziness, no fever, no hemoptysis, no nausea, no orthopnea, no palpitations, no PND, no sputum production, no vomiting and no weakness. He has tried nitroglycerin for the symptoms. Review of Systems   Constitutional: Negative for chills and fever. HENT: Negative for nosebleeds. Eyes: Negative for blurred vision and double vision. Respiratory: Negative for cough, hemoptysis, sputum production and wheezing. Cardiovascular: Positive for chest pain. Negative for palpitations, orthopnea, claudication, leg swelling and PND. Gastrointestinal: Negative for heartburn, nausea and vomiting. Musculoskeletal: Negative for myalgias. Skin: Negative for rash. Neurological: Negative for dizziness and weakness. Endo/Heme/Allergies: Does not bruise/bleed easily. Family History   Problem Relation Age of Onset    Hypertension Mother     Diabetes Mother     Hypertension Father     Cancer Father         Prostate    Heart Disease Father     Heart Attack Father     Stroke Brother     Migraines Brother     Cancer Sister         Breast    Migraines Sister     Stroke Brother        Past Medical History:   Diagnosis Date    Abnormal tilt table test 2013    Back pain     CAD (coronary artery disease)     Coronary artery spasm (HCC)     GERD (gastroesophageal reflux disease)     Hypertension     Kidney stone     Migraine     migraines    Prediabetes     Sleep apnea     uses cpap    Syncope     has had recurrent issues, has had work-up to include EEG, monitor, tilt table, Carotids, ECHO       Past Surgical History:   Procedure Laterality Date    CARDIAC SURG PROCEDURE UNLIST      cardiac catheterization    COLONOSCOPY N/A 9/30/2016    COLONOSCOPY with biopsy performed by Arturo Espana MD at [facility] [place] [facility] ENDOSCOPY    HX BACK SURGERY      x3    HX ENDOSCOPY      reportedly normal by patient    HX HEENT      sinus sx    HX ORTHOPAEDIC      left shoulder, right knee, left thumb, left great toe sx    HX UROLOGICAL      Lithotripsy       Social History     Tobacco Use    Smoking status: Never Smoker    Smokeless tobacco: Never Used   Substance Use Topics    Alcohol use: Yes     Frequency: Monthly or less     Drinks per session: 1 or 2     Binge frequency: Never     Comment: rare use       No Known Allergies        Visit Vitals  /64   Pulse 90   Ht 5' 9\" (1.753 m)   Wt 112.5 kg (248 lb)   BMI 36.62 kg/m²         Physical Exam   Constitutional: He is oriented to person, place, and time. He appears well-developed and well-nourished. HENT:   Head: Normocephalic and atraumatic.    Eyes: Conjunctivae are normal.   Neck: Neck supple. No JVD present. No tracheal deviation present. No thyromegaly present. Cardiovascular: Normal rate, regular rhythm and normal heart sounds. Exam reveals no gallop and no friction rub. No murmur heard. Pulmonary/Chest: Breath sounds normal. No respiratory distress. He has no wheezes. He has no rales. He exhibits no tenderness. Abdominal: Soft. There is no abdominal tenderness. Musculoskeletal:         General: No edema. Neurological: He is alert and oriented to person, place, and time. Skin: Skin is warm and dry. Psychiatric: He has a normal mood and affect. Mr. Ya Chandler has a reminder for a \"due or due soon\" health maintenance. I have asked that he contact his primary care provider for follow-up on this health maintenance. Cath-2004  rca spasm-  lcx 30-40%  Stress test-2015  Fixed ant defect  Echo-2015  Normal lvef  ekg-7/2016-  wnl  I Have personally reviewed recent relevant labs available and discussed with patient  5/2019-BMP, LFT, lipids. LDL-49    Echo Cardiogram Complete2/3/2020  [address]  Component Name Value Ref Range   EF Echo 60     Result Impression   :   NORMAL LEFT VENTRICULAR CAVITY SIZE AND SYSTOLIC FUNCTION WITH AN EJECTION FRACTION OF  60 %. ABNORMAL DIASTOLIC FILLING PATTERN. MITRAL ANNULAR CALCIFICATION WITH MILD MITRAL REGURGITATION. SCLEROTIC TRILEAFLET AORTIC VALVE WITHOUT EVIDENCE OF STENOSIS. TRACE OF TRICUSPID REGURGITATION WITH A RVSP OF 38 MMHG. STRUCTURALLY NORMAL PULMONIC VALVE WITH TRACE PULMONIC REGURGITATION. NO EVIDENCE OF PERICARDIAL EFFUSION. NO MASSES, SHUNTS OR THROMBI SEEN. NO PREVIOUS REPORT FOR COMPARISON. 2/2020- carotid artery PVL  Conclusions: Normal extracranial carotid and vertebral duplex examination. Assessment         ICD-10-CM ICD-9-CM    1.  Coronary artery disease involving native coronary artery of native heart with angina pectoris with documented spasm ([facility]) I25.111 414.01      413.9     Stable continue current medical management. In view of recent episode of syncope which appears vasovagal we will hold off on Norvasc and see   2. Essential hypertension with goal blood pressure less than 140/90 I10 401.9     Stable on treatment   3. Hyperlipidemia, unspecified hyperlipidemia type E78.5 272.4     Continue treatment. Follow-up with PCP for lab   4. Vasovagal syncope R55 780.2     Episode of syncope. Usually just lightheadedness. Will discontinue amlodipine and monitor continue with support stocking if needed use of midodrine   Had tried cardizem in past -had drop in bp  No acei/normal lvef  2/2019  Angina significantly improved on addition of Imdur. Blood pressure controlled continue medical management  In 2019  Cardiac status stable. Angina stable. He has orthostatic dizziness. Likely from neuropathy. Will use support stocking. Consider additional medication if required if symptoms are persistent  2/2020  Recent syncope clinically vasovagal.  History of positive tilt table in 2013. Will discontinue amlodipine and monitor as blood pressure on low normal side. Monitor for angina and spasm. Continue with support stocking. Pressure maneuvers and if needed midodrine  Medications Discontinued During This Encounter   Medication Reason    amLODIPine (NORVASC) 5 mg tablet Therapy Completed       No orders of the defined types were placed in this encounter. Follow-up and Dispositions    · Return in about 6 months (around 8/14/2020).

## 2020-03-17 DIAGNOSIS — F32.4 MAJOR DEPRESSIVE DISORDER WITH SINGLE EPISODE, IN PARTIAL REMISSION (HCC): ICD-10-CM

## 2020-03-17 RX ORDER — SERTRALINE HYDROCHLORIDE 100 MG/1
TABLET, FILM COATED ORAL
Qty: 90 TAB | Refills: 0 | Status: SHIPPED | OUTPATIENT
Start: 2020-03-17 | End: 2020-06-13

## 2020-03-29 RX ORDER — AMLODIPINE BESYLATE 5 MG/1
TABLET ORAL
Qty: 90 TAB | Refills: 3 | Status: SHIPPED | OUTPATIENT
Start: 2020-03-29 | End: 2021-01-25

## 2020-06-26 DIAGNOSIS — E78.5 HYPERLIPIDEMIA, UNSPECIFIED HYPERLIPIDEMIA TYPE: ICD-10-CM

## 2020-06-27 RX ORDER — ROSUVASTATIN CALCIUM 10 MG/1
TABLET, COATED ORAL
Qty: 90 TAB | Refills: 0 | Status: SHIPPED | OUTPATIENT
Start: 2020-06-27 | End: 2020-12-10 | Stop reason: SDUPTHER

## 2020-07-01 ENCOUNTER — OFFICE VISIT (OUTPATIENT)
Dept: PULMONOLOGY | Age: 55
End: 2020-07-01

## 2020-07-01 VITALS
TEMPERATURE: 98.3 F | OXYGEN SATURATION: 98 % | HEIGHT: 69 IN | SYSTOLIC BLOOD PRESSURE: 120 MMHG | RESPIRATION RATE: 20 BRPM | HEART RATE: 96 BPM | DIASTOLIC BLOOD PRESSURE: 80 MMHG | WEIGHT: 244 LBS | BODY MASS INDEX: 36.14 KG/M2

## 2020-07-01 DIAGNOSIS — G47.63 BRUXISM, SLEEP-RELATED: ICD-10-CM

## 2020-07-01 DIAGNOSIS — Z99.89 OSA ON CPAP: Primary | ICD-10-CM

## 2020-07-01 DIAGNOSIS — E66.9 OBESITY (BMI 30-39.9): ICD-10-CM

## 2020-07-01 DIAGNOSIS — G47.33 OSA ON CPAP: Primary | ICD-10-CM

## 2020-07-01 DIAGNOSIS — K21.9 GASTROESOPHAGEAL REFLUX DISEASE, ESOPHAGITIS PRESENCE NOT SPECIFIED: ICD-10-CM

## 2020-07-01 RX ORDER — CHOLECALCIFEROL (VITAMIN D3) 50 MCG
CAPSULE ORAL DAILY
COMMUNITY
End: 2020-10-19

## 2020-07-01 NOTE — PROGRESS NOTES
The pt uses his CPap every night. He is snoring at times and occasionally his wife told him that he stops breathing. In Feb. He was standing in an food order line and \"passed out\" came to lying on the floor. All testing during a 2 day [address] [place] [address] Said stay was negative. Flowsheets completed.

## 2020-07-01 NOTE — LETTER
7/5/20 Patient: Karin Cooper YOB: 1965 Date of Visit: 7/1/2020 Salma Mercedes MD 
[address] [place], [place] [address] [id number] [place] [address] VIA In Basket Dear Samla Mercedes MD, Thank you for referring Mr. Vitaly Phillips to [address] [place] [address] for evaluation. My notes for this consultation are attached. If you have questions, please do not hesitate to call me. I look forward to following your patient along with you.  
 
 
Sincerely, 
 
Arielle Vo, DO

## 2020-07-01 NOTE — PROGRESS NOTES
[facility]  Pulmonary, Critical Care, and Sleep Medicine    Office Progress Note - Initial Evaluation      Primary Care Physician: Dandre Henson MD     Reason for Visit:  Establish follow up care for DB and PAP therapy    Assessment:  1. Obstructive Sleep Apnea (DB): Currently well controlled and receiving clinical benefit. Very good compliance. 2. GERD with ongoing episodes of water brash while on Prilosec    3. Bruxism: Patient reports intermittent teeth grinding  4. Hyperlipidemia  5. Obesity: Body mass index is 36.03 kg/m². 6.          Plan:  COVID-19 precautions discussed to include: wearing mask in public, handwashing, social distancing and sleeping in a separate bedroom when using PAP therapy. Follow up with PCP regarding GERD symptoms and to see if further workup is needed. I discussed the concern for aspiration- especially when on PAP therapy but patient is wearing a nasal mask and not a full face mask so it will be easier to expectorate if needed. Renew PAP supplies  Monitor report of Bruxism for now. If an issue can consider an oral appliance  · Discussed the need for consistent PAP hygiene. If he does not want to clean by hand, I have recommended he look into a UV  but these devices are typically not covered by insurance. · Healthy lifestyle changes to include weight loss and exercise discussed. · Ongoing healthy sleep habits are encouraged. ·  and workplace safety reviewed and discussed as appropriate. Drowsy and/or inattentive driving should be avoided. · Follow-up in 12 months, sooner should new symptoms or problems arise. · Follow up with Primary Care Provider (PCP) as directed and for routine health care maintenance. History of Present Illness: Mr. Carlos Cruz is a 62 y.o. male patient who presents to establish follow up care for DB and PAP therapy. . The history was provided by the patient.     The patient was previously followed in our clinic by DR. Haylee Rosales and was last seen on 1/23/2017. The patient then transferred his care to [facility]. which is no longer in the local area. The patient now desires to transfer his care back to our clinic. Overall, the patient reports that he continues to do well with his therapy. He has carried a diagnosis of DB since 2001 and states that this is his 3rd device and he is very comfortable and acclimated to therapy. He states that while on PAP therapy he feels more rested and has more energy. Prior to PAP therapy, snoring and apneas were reported. He states \" I am a mess without my PAP device\". 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 Motor vehicle accident(s) associated with drowsy driving have not occurred. Dental: Teeth clenching or grinding is reported. Naps: are not reported. Leg Symptoms/Pain: He does not have unpleasant or crawling sensation in legs or strong urge to move when inactive. GERD: is reported. Patient does take Prilosec but still has noted some episodes of high level reflux with water brash      Mood: Happy    Sleep-Wake History:       Pain, typically does not disturb their sleep. Vivid dreams are reported occassionally      Symptom(s) suggestive of cataplexy are not reported.     Sleep paralysis is not  reported. Hypnagogic and/or hypnopompic hallucinations are not reported. Sleep walking is not  reported. Sleep talking is not  reported. Other unusual and/or parasomnia behaviors are not reported. Family Sleep History:  N/A    Alcohol: rare intake  Caffeine: Coke- 1 small can/day          Stop Sunshine Bandar 10/25/2016   Does the patient snore loudly (louder than talking or loud enough to be heard through closed doors)? 1   Does the patient often feel tired, fatigued, or sleepy during the daytime, even after a \"good\" night's sleep? 1   Has anyone ever observed the patient stop breathing during their sleep? 1   Does the patient have or are they being treated for high blood pressure? 1   Is the patient's BMI greater than 35? 1   Is your neck circumference greater than 17 inches (Male) or 16 inches (Female)? 1   Is the patient older than 48? 1   Is the patient male?  1   DB Score 8       3 most recent PHQ Screens 1/23/2020   Little interest or pleasure in doing things Not at all   Feeling down, depressed, irritable, or hopeless Not at all   Total Score PHQ 2 0       [place] Scale 10/25/2016   Sitting and Reading 2   Watching TV 1   Sitting, inactive in a public place (e.g. a movie theater or meeting) 0   As a passenger in a car for an hour, without a break 2   Lying down to rest in the afternoon, when circumstances permit 2   Sitting and talking to someone 0   Sitting quietly after lunch without alcohol 2   In a car, while stopped for a few minutes in traffic 1   [place] Sleepiness Score 10      Immunization History   Administered Date(s) Administered    Influenza Vaccine 10/16/2019    Influenza Vaccine (Quad) PF 09/01/2017    Td 11/13/2012         Past Medical History:  Past Medical History:   Diagnosis Date    Abnormal tilt table test 2013    Back pain     CAD (coronary artery disease)     Coronary artery spasm ([facility] [address]     GERD (gastroesophageal reflux disease)     Hypertension     Kidney stone     Migraine     migraines    Prediabetes     Sleep apnea     uses cpap    Syncope     has had recurrent issues, has had work-up to include EEG, monitor, tilt table, Carotids, ECHO       Past Surgical History:  Past Surgical History:   Procedure Laterality Date    CARDIAC SURG PROCEDURE UNLIST      cardiac catheterization    COLONOSCOPY N/A 9/30/2016    COLONOSCOPY with biopsy performed by Jordi Quezada [facility] at [facility] ENDOSCOPY    HX BACK SURGERY      x3    HX ENDOSCOPY      reportedly normal by patient    HX HEENT      sinus sx    HX ORTHOPAEDIC      left shoulder, right knee, left thumb, left great toe sx    HX UROLOGICAL      Lithotripsy       Family History:  Family History   Problem Relation Age of Onset    Hypertension Mother     Diabetes Mother     Hypertension Father     Cancer Father         Prostate    Heart Disease Father     Heart Attack Father     Stroke Brother     Migraines Brother     Cancer Sister         Breast    Migraines Sister     Stroke Brother        Social History:  Social History     Tobacco Use    Smoking status: Never Smoker    Smokeless tobacco: Never Used   Substance Use Topics    Alcohol use: Yes     Frequency: Monthly or less     Drinks per session: 1 or 2     Binge frequency: Never     Comment: rare use    Drug use: No        Medications:  Current Outpatient Medications on File Prior to Visit   Medication Sig Dispense Refill    rosuvastatin (CRESTOR) 10 mg tablet TAKE 1 TABLET BY MOUTH  NIGHTLY 90 Tab 0    isosorbide mononitrate ER (IMDUR) 60 mg CR tablet Take 0.5 Tabs by mouth daily. 90 Tab 3    sertraline (ZOLOFT) 100 mg tablet TAKE 1 TABLET BY MOUTH  DAILY 90 Tab 0    tamsulosin (FLOMAX) 0.4 mg capsule Take 1 Cap by mouth daily (after dinner). 90 Cap 3    amLODIPine (NORVASC) 5 mg tablet TAKE 1 TABLET BY MOUTH  DAILY 90 Tab 3    isosorbide mononitrate ER (IMDUR) 30 mg tablet Take  by mouth daily.       latanoprost (XALATAN) 0.005 % ophthalmic solution Administer 1 Drop to both eyes daily. 1    nortriptyline (PAMELOR) 25 mg capsule Take 1 Cap by mouth nightly. Indications: headaches 90 Cap 1    omeprazole (PRILOSEC) 40 mg capsule Take 1 Cap by mouth daily. 90 Cap 1    tamsulosin (FLOMAX) 0.4 mg capsule Take 1 Cap by mouth daily. 90 Cap 3    nitroglycerin (NITROLINGUAL) 400 mcg/spray spray 1 Spray by SubLINGual route every five (5) minutes as needed for Chest Pain for up to 3 doses. 1 Bottle 1    aspirin delayed-release 81 mg tablet Take 81 mg by mouth daily.  multivitamin (ONE A DAY) tablet Take 1 Tab by mouth daily.  cpap machine kit by Does Not Apply route. CPAP at 11 cm with humidifier. Mask: Wisp nasal, large. Length of need 99 months. Replace mask and accessories as needed times 12 months. Download data at 30 days and fax at [phone]. Dx- DB on CPAP, RLS, Obesity 1 Kit 0     No current facility-administered medications on file prior to visit.          Allergy:  No Known Allergies    Review of Systems  General ROS: negative for - chills, fatigue, fever, hot flashes, malaise, night sweats, sleep disturbance, weight gain or weight loss  ENT ROS: negative for - epistaxis, headaches, hearing change, nasal congestion, nasal discharge, nasal polyps, oral lesions, sinus pain, sneezing, sore throat, tinnitus, vertigo, visual changes or vocal changes  Hematological and Lymphatic ROS: negative for - bleeding problems, blood clots, bruising, jaundice, pallor or swollen lymph nodes  Endocrine ROS: negative for - polydipsia/polyuria, skin changes, temperature intolerance or unexpected weight changes  Respiratory ROS: no cough, shortness of breath, or wheezing  Cardiovascular ROS: no chest pain or dyspnea on exertion  Gastrointestinal ROS: no abdominal pain, change in bowel habits, or black or bloody stools  Genito-Urinary ROS: no dysuria, trouble voiding, or hematuria  Musculoskeletal ROS: negative  Neurological ROS: no TIA or stroke symptoms  Dermatological ROS: negative for - pruritus, rash or skin lesion changes   Psychological ROS: negative   Otherwise negative. Physical Exam:  Blood pressure 120/80, pulse 96, temperature 98.3 °F (36.8 °C), resp. rate 20, height 5' 9\" (1.753 m), weight 110.7 kg (244 lb), SpO2 98 %. on RA, Body mass index is 36.03 kg/m². General: No distress, acyanotic, appears stated age, cooperative, pleasant- wearing face mask  HEENT: PERRL, EOMI, throat without erythema or exudate, Tongue- dental indention on tongue, Mallampati's score 2+, Uvula- midline. Neck: Supple,  no abnormally enlarged lymph nodes, thyroid is not enlarged, non-tender, No JVD  Chest: Normal.  Lungs: Moderate air entry, clear to auscultation bilaterally,   Heart: Regular rate and rhythm, S1S2 present, without murmur. Abdomen: Protuberant. abdomen is soft without significant tenderness, or guarding. Extremity: Negative for cyanosis, edema, or clubbing. Skin: Skin color, texture, turgor normal. No rashes or lesions. Neurological: CN 2-12 grossly intact, normal muscle tone.     Data Reviewed:  CBC:   Lab Results   Component Value Date/Time    WBC 6.9 05/20/2019 11:24 AM    HGB 14.6 05/20/2019 11:24 AM    HCT 45.8 05/20/2019 11:24 AM    PLATELET 884 52/95/2702 11:24 AM    MCV 89.6 05/20/2019 11:24 AM       BMP:   Lab Results   Component Value Date/Time    Sodium 143 05/20/2019 11:24 AM    Potassium 4.8 05/20/2019 11:24 AM    Chloride 108 05/20/2019 11:24 AM    CO2 27 05/20/2019 11:24 AM    Anion gap 8 05/20/2019 11:24 AM    Glucose 92 05/20/2019 11:24 AM    BUN 9 05/20/2019 11:24 AM    Creatinine 1.00 05/20/2019 11:24 AM    BUN/Creatinine ratio 9 (L) 05/20/2019 11:24 AM    GFR est AA >60 05/20/2019 11:24 AM    GFR est non-AA >60 05/20/2019 11:24 AM    Calcium 8.9 05/20/2019 11:24 AM        TSH:  Lab Results   Component Value Date/Time    TSH 1.55 05/20/2019 11:24 AM       Historical Sleep Testing Data:  · PSG: Reported in 2001: AHI: 8.6  · CPAP Titration 10/25/16: CPAP:11      Malina DO Gerry, FCCP  Pulmonary, Sleep and Critical Care Medicine

## 2020-07-06 ENCOUNTER — HOSPITAL ENCOUNTER (OUTPATIENT)
Dept: LAB | Age: 55
Discharge: HOME | End: 2020-07-06

## 2020-07-06 LAB — XX-LABCORP SPECIMEN COL,LCBCF: NORMAL

## 2020-07-06 PROCEDURE — 99001 SPECIMEN HANDLING PT-LAB: CPT

## 2020-07-07 ENCOUNTER — TELEPHONE (OUTPATIENT)
Dept: FAMILY MEDICINE CLINIC | Age: 55
End: 2020-07-07

## 2020-07-07 LAB
25(OH)D3+25(OH)D2 SERPL-MCNC: 21.7 NG/ML (ref 30–100)
ALBUMIN SERPL-MCNC: 4.6 G/DL (ref 3.8–4.9)
ALBUMIN/GLOB SERPL: 1.8 {RATIO} (ref 1.2–2.2)
ALP SERPL-CCNC: 89 IU/L (ref 39–117)
ALT SERPL-CCNC: 51 IU/L (ref 0–44)
AST SERPL-CCNC: 37 IU/L (ref 0–40)
BILIRUB SERPL-MCNC: <0.2 MG/DL (ref 0–1.2)
BUN SERPL-MCNC: 9 MG/DL (ref 6–24)
BUN/CREAT SERPL: 10 (ref 9–20)
CALCIUM SERPL-MCNC: 9 MG/DL (ref 8.7–10.2)
CHLORIDE SERPL-SCNC: 106 MMOL/L (ref 96–106)
CHOLEST SERPL-MCNC: 142 MG/DL (ref 100–199)
CO2 SERPL-SCNC: 23 MMOL/L (ref 20–29)
CREAT SERPL-MCNC: 0.88 MG/DL (ref 0.76–1.27)
ERYTHROCYTE [DISTWIDTH] IN BLOOD BY AUTOMATED COUNT: 14.8 % (ref 11.6–15.4)
GLOBULIN SER CALC-MCNC: 2.6 G/DL (ref 1.5–4.5)
GLUCOSE SERPL-MCNC: 102 MG/DL (ref 65–99)
HBA1C MFR BLD: 6 % (ref 4.8–5.6)
HCT VFR BLD AUTO: 44.3 % (ref 37.5–51)
HDLC SERPL-MCNC: 39 MG/DL
HGB BLD-MCNC: 14.6 G/DL (ref 13–17.7)
INTERPRETATION, 910389: NORMAL
LDLC SERPL CALC-MCNC: 81 MG/DL (ref 0–99)
MCH RBC QN AUTO: 28 PG (ref 26.6–33)
MCHC RBC AUTO-ENTMCNC: 33 G/DL (ref 31.5–35.7)
MCV RBC AUTO: 85 FL (ref 79–97)
PLATELET # BLD AUTO: 221 X10E3/UL (ref 150–450)
POTASSIUM SERPL-SCNC: 4.2 MMOL/L (ref 3.5–5.2)
PROT SERPL-MCNC: 7.2 G/DL (ref 6–8.5)
RBC # BLD AUTO: 5.22 X10E6/UL (ref 4.14–5.8)
SODIUM SERPL-SCNC: 142 MMOL/L (ref 134–144)
TRIGL SERPL-MCNC: 110 MG/DL (ref 0–149)
VLDLC SERPL CALC-MCNC: 22 MG/DL (ref 5–40)
WBC # BLD AUTO: 6.7 X10E3/UL (ref 3.4–10.8)

## 2020-07-08 ENCOUNTER — OFFICE VISIT (OUTPATIENT)
Dept: FAMILY MEDICINE CLINIC | Age: 55
End: 2020-07-08

## 2020-07-08 VITALS
SYSTOLIC BLOOD PRESSURE: 118 MMHG | HEIGHT: 69 IN | BODY MASS INDEX: 36.43 KG/M2 | HEART RATE: 73 BPM | DIASTOLIC BLOOD PRESSURE: 88 MMHG | TEMPERATURE: 98.4 F | RESPIRATION RATE: 16 BRPM | OXYGEN SATURATION: 98 % | WEIGHT: 246 LBS

## 2020-07-08 DIAGNOSIS — G47.33 OSA ON CPAP: ICD-10-CM

## 2020-07-08 DIAGNOSIS — F32.4 MAJOR DEPRESSIVE DISORDER WITH SINGLE EPISODE, IN PARTIAL REMISSION (HCC): ICD-10-CM

## 2020-07-08 DIAGNOSIS — R73.03 PREDIABETES: ICD-10-CM

## 2020-07-08 DIAGNOSIS — N20.0 KIDNEY STONE: ICD-10-CM

## 2020-07-08 DIAGNOSIS — E66.9 OBESITY WITH SERIOUS COMORBIDITY, UNSPECIFIED CLASSIFICATION, UNSPECIFIED OBESITY TYPE: ICD-10-CM

## 2020-07-08 DIAGNOSIS — R51.9 CHRONIC DAILY HEADACHE: ICD-10-CM

## 2020-07-08 DIAGNOSIS — I10 ESSENTIAL HYPERTENSION WITH GOAL BLOOD PRESSURE LESS THAN 140/90: Primary | ICD-10-CM

## 2020-07-08 DIAGNOSIS — N40.0 BENIGN PROSTATIC HYPERPLASIA, UNSPECIFIED WHETHER LOWER URINARY TRACT SYMPTOMS PRESENT: ICD-10-CM

## 2020-07-08 DIAGNOSIS — E55.9 VITAMIN D DEFICIENCY: ICD-10-CM

## 2020-07-08 DIAGNOSIS — E78.5 HYPERLIPIDEMIA, UNSPECIFIED HYPERLIPIDEMIA TYPE: ICD-10-CM

## 2020-07-08 DIAGNOSIS — K21.9 GASTROESOPHAGEAL REFLUX DISEASE WITHOUT ESOPHAGITIS: ICD-10-CM

## 2020-07-08 DIAGNOSIS — Z99.89 OSA ON CPAP: ICD-10-CM

## 2020-07-08 RX ORDER — ERGOCALCIFEROL 1.25 MG/1
50000 CAPSULE ORAL
Qty: 12 CAP | Refills: 3 | Status: SHIPPED | OUTPATIENT
Start: 2020-07-08 | End: 2021-05-31

## 2020-07-08 NOTE — PATIENT INSTRUCTIONS
A Healthy Lifestyle: Care Instructions Your Care Instructions A healthy lifestyle can help you feel good, stay at a healthy weight, and have plenty of energy for both work and play. A healthy lifestyle is something you can share with your whole family. A healthy lifestyle also can lower your risk for serious health problems, such as high blood pressure, heart disease, and diabetes. You can follow a few steps listed below to improve your health and the health of your family. Follow-up care is a key part of your treatment and safety. Be sure to make and go to all appointments, and call your doctor if you are having problems. It's also a good idea to know your test results and keep a list of the medicines you take. How can you care for yourself at home? · Do not eat too much sugar, fat, or fast foods. You can still have dessert and treats now and then. The goal is moderation. · Start small to improve your eating habits. Pay attention to portion sizes, drink less juice and soda pop, and eat more fruits and vegetables. ? Eat a healthy amount of food. A 3-ounce serving of meat, for example, is about the size of a deck of cards. Fill the rest of your plate with vegetables and whole grains. ? Limit the amount of soda and sports drinks you have every day. Drink more water when you are thirsty. ? Eat at least 5 servings of fruits and vegetables every day. It may seem like a lot, but it is not hard to reach this goal. A serving or helping is 1 piece of fruit, 1 cup of vegetables, or 2 cups of leafy, raw vegetables. Have an apple or some carrot sticks as an afternoon snack instead of a candy bar.  Try to have fruits and/or vegetables at every meal. 
 · Make exercise part of your daily routine. You may want to start with simple activities, such as walking, bicycling, or slow swimming. Try to be active 30 to 60 minutes every day. You do not need to do all 30 to 60 minutes all at once. For example, you can exercise 3 times a day for 10 or 20 minutes. Moderate exercise is safe for most people, but it is always a good idea to talk to your doctor before starting an exercise program. 
· Keep moving. Jennifer Lieu the lawn, work in the garden, or [employer]. Take the stairs instead of the elevator at work. · If you smoke, quit. People who smoke have an increased risk for heart attack, stroke, cancer, and other lung illnesses. Quitting is hard, but there are ways to boost your chance of quitting tobacco for good. ? Use nicotine gum, patches, or lozenges. ? Ask your doctor about stop-smoking programs and medicines. ? Keep trying. In addition to reducing your risk of diseases in the future, you will notice some benefits soon after you stop using tobacco. If you have shortness of breath or asthma symptoms, they will likely get better within a few weeks after you quit. · Limit how much alcohol you drink. Moderate amounts of alcohol (up to 2 drinks a day for men, 1 drink a day for women) are okay. But drinking too much can lead to liver problems, high blood pressure, and other health problems. Family health If you have a family, there are many things you can do together to improve your health. · Eat meals together as a family as often as possible. · Eat healthy foods. This includes fruits, vegetables, lean meats and dairy, and whole grains. · Include your family in your fitness plan. Most people think of activities such as jogging or tennis as the way to fitness, but there are many ways you and your family can be more active. Anything that makes you breathe hard and gets your heart pumping is exercise. Here are some tips: ? Walk to do errands or to take your child to school or the bus. 
? Go for a family bike ride after dinner instead of watching TV. Where can you learn more? Go to http://sandra-ramana.info/ Enter I044 in the search box to learn more about \"A Healthy Lifestyle: Care Instructions. \" Current as of: January 31, 2020               Content Version: 12.5 © 2006-2020 [employer]. Care instructions adapted under license by [employer] (which disclaims liability or warranty for this information). If you have questions about a medical condition or this instruction, always ask your healthcare professional. [address] any warranty or liability for your use of this information.

## 2020-07-08 NOTE — PROGRESS NOTES
1. Have you been to the ER, urgent care clinic since your last visit? Hospitalized since your last visit? No    2. Have you seen or consulted any other health care providers outside of the [address] [place] [address] since your last visit? Include any pap smears or colon screening.  No

## 2020-07-08 NOTE — PROGRESS NOTES
SUBJECTIVE  Chief Complaint   Patient presents with    Follow Up Chronic Condition     htn. prediabetes    Results     results review     Patient presents for follow-up. No new complaints. He has metabolic disease in the way of hypertension, prediabetes, hyperlipidemia in the setting of obesity. He reports compliance with his current medications which we reviewed. He has no side effects on the medications. He has depression that is better on the increased dose of Zoloft. He has no side effects. No harmful ideations outwardly expressed. He has GERD controlled on PPIs. He has had colorectal cancer screening in 2016. He has sleep apnea and chronic daily headaches. He is on CPAP. He has BPH and a family history of prostate CA. He has had kidney stones as well. He has established with a new urologist.  Disappointingly they did not address all of his  issues by neglecting kidney stones in their note. Patient has no complaints at this time of hematuria or flank pains. Past Medical History:   Diagnosis Date    Abnormal tilt table test 2013    Back pain     CAD (coronary artery disease)     Coronary artery spasm (HCC)     GERD (gastroesophageal reflux disease)     Hypertension     Kidney stone     Migraine     migraines    Prediabetes     Sleep apnea     uses cpap    Syncope     has had recurrent issues, has had work-up to include EEG, monitor, tilt table, Carotids, ECHO       Current Outpatient Medications:     B.infantis-B.ani-B.long-B.bifi (Probiotic 4X) 10-15 mg TbEC, Take  by mouth daily. , Disp: , Rfl:     rosuvastatin (CRESTOR) 10 mg tablet, TAKE 1 TABLET BY MOUTH  NIGHTLY, Disp: 90 Tab, Rfl: 0    isosorbide mononitrate ER (IMDUR) 60 mg CR tablet, Take 0.5 Tabs by mouth daily. , Disp: 90 Tab, Rfl: 3    sertraline (ZOLOFT) 100 mg tablet, TAKE 1 TABLET BY MOUTH  DAILY, Disp: 90 Tab, Rfl: 0    tamsulosin (FLOMAX) 0.4 mg capsule, Take 1 Cap by mouth daily (after dinner). , Disp: 90 Cap, Rfl: 3    amLODIPine (NORVASC) 5 mg tablet, TAKE 1 TABLET BY MOUTH  DAILY, Disp: 90 Tab, Rfl: 3    latanoprost (XALATAN) 0.005 % ophthalmic solution, Administer 1 Drop to both eyes daily. , Disp: , Rfl: 1    nortriptyline (PAMELOR) 25 mg capsule, Take 1 Cap by mouth nightly. Indications: headaches, Disp: 90 Cap, Rfl: 1    omeprazole (PRILOSEC) 40 mg capsule, Take 1 Cap by mouth daily. , Disp: 90 Cap, Rfl: 1    tamsulosin (FLOMAX) 0.4 mg capsule, Take 1 Cap by mouth daily. , Disp: 90 Cap, Rfl: 3    nitroglycerin (NITROLINGUAL) 400 mcg/spray spray, 1 [place] by SubLINGual route every five (5) minutes as needed for Chest Pain for up to 3 doses. , Disp: 1 Bottle, Rfl: 1    aspirin delayed-release 81 mg tablet, Take 81 mg by mouth daily. , Disp: , Rfl:     multivitamin (ONE A DAY) tablet, Take 1 Tab by mouth daily. , Disp: , Rfl:     cpap machine kit, by Does Not Apply route. CPAP at 11 cm with humidifier. Mask: Wisp nasal, large. Length of need 99 months. Replace mask and accessories as needed times 12 months. Download data at 30 days and fax at [phone]. Dx- DB on CPAP, RLS, Obesity (Patient taking differently: by Does Not Apply route. CPAP at 11 cm with humidifier. Mask: Wisp nasal, large. Length of need 99 months. Replace mask and accessories as needed times 12 months. Download data at 30 days and fax at [phone]. Dx- DB on CPAP, RLS, Obesity  [facility]), Disp: 1 Kit, Rfl: 0    isosorbide mononitrate ER (IMDUR) 30 mg tablet, Take  by mouth daily. , Disp: , Rfl:     No Known Allergies    Past Surgical History:   Procedure Laterality Date    CARDIAC SURG PROCEDURE UNLIST      cardiac catheterization    COLONOSCOPY N/A 9/30/2016    COLONOSCOPY with biopsy performed by [facility]ia [facility] at [facility]OSCOPY    HX BACK SURGERY      x3    HX ENDOSCOPY      reportedly normal by patient    HX HEENT      sinus sx    HX ORTHOPAEDIC      left shoulder, right knee, left thumb, left great toe sx    HX UROLOGICAL      Lithotripsy     Social History     Tobacco Use    Smoking status: Never Smoker    Smokeless tobacco: Never Used   Substance Use Topics    Alcohol use: Yes     Frequency: Monthly or less     Drinks per session: 1 or 2     Binge frequency: Never     Comment: rare use    Drug use: No     Family History   Problem Relation Age of Onset    Hypertension Mother     Diabetes Mother     Hypertension Father     Cancer Father         Prostate    Heart Disease Father     Heart Attack Father     Stroke Brother     Migraines Brother     Cancer Sister         Breast    Migraines Sister     Stroke Brother      ROS:  History obtained from the patient  · General: negative for - chills, fever  · HEENT: no sore throat, nasal congestion, or any URI symptoms  · Respiratory: no cough or shortness of breath  · Cardiovascular: no chest pain, palpitations, or dyspnea on exertion  · Gastrointestinal: no abdominal pain, N/V, change in bowel habits, or black or bloody stools    OBJECTIVE    Blood pressure 118/88, pulse 73, temperature 98.4 °F (36.9 °C), temperature source Oral, resp. rate 16, height 5' 9\" (1.753 m), weight 246 lb (111.6 kg), SpO2 98 %. General:  Alert, cooperative, well appearing, in no apparent distress. CV:  The heart sounds are regular in rate and rhythm. There is a normal S1 and S2. There or no murmurs  Lungs: Inspiratory and expiratory efforts are full and unlabored. Lung sounds are clear and equal to auscultation throughout all lung fields without wheezing, rales, or rhonchi. Skin:  No rashes, no jaundice. Psych: normal affect. Mood good. Oriented x 3. Judgement and insight intact. Results for Jeet November (MRN [id number]) as of 7/9/2020 14:28   Ref.  Range 7/6/2020 00:00   WBC Latest Ref Range: 3.4 - 10.8 x10E3/uL 6.7   RBC Latest Ref Range: 4.14 - 5.80 x10E6/uL 5.22   HGB Latest Ref Range: 13.0 - 17.7 g/dL 14.6   HCT Latest Ref Range: 37.5 - 51.0 % 44.3   MCV Latest Ref Range: 79 - 97 fL 85   MCH Latest Ref Range: 26.6 - 33.0 pg 28.0   MCHC Latest Ref Range: 31.5 - 35.7 g/dL 33.0   RDW Latest Ref Range: 11.6 - 15.4 % 14.8   PLATELET Latest Ref Range: 150 - 450 x10E3/uL 221   Sodium Latest Ref Range: 134 - 144 mmol/L 142   Potassium Latest Ref Range: 3.5 - 5.2 mmol/L 4.2   Chloride Latest Ref Range: 96 - 106 mmol/L 106   CO2 Latest Ref Range: 20 - 29 mmol/L 23   Glucose Latest Ref Range: 65 - 99 mg/dL 102 (H)   BUN Latest Ref Range: 6 - 24 mg/dL 9   Creatinine Latest Ref Range: 0.76 - 1.27 mg/dL 0.88   BUN/Creatinine ratio Latest Ref Range: 9 - 20  10   Calcium Latest Ref Range: 8.7 - 10.2 mg/dL 9.0   GFR est non-AA Latest Ref Range: >59 mL/min/1.73 96   GFR est AA Latest Ref Range: >59 mL/min/1.73 111   Bilirubin, total Latest Ref Range: 0.0 - 1.2 mg/dL <0.2   Protein, total Latest Ref Range: 6.0 - 8.5 g/dL 7.2   Albumin Latest Ref Range: 3.8 - 4.9 g/dL 4.6   A-G Ratio Latest Ref Range: 1.2 - 2.2  1.8   ALT Latest Ref Range: 0 - 44 IU/L 51 (H)   AST Latest Ref Range: 0 - 40 IU/L 37   Alk. phosphatase Latest Ref Range: 39 - 117 IU/L 89   Triglyceride Latest Ref Range: 0 - 149 mg/dL 110   Cholesterol, total Latest Ref Range: 100 - 199 mg/dL 142   HDL Cholesterol Latest Ref Range: >39 mg/dL 39 (L)   VLDL, calculated Latest Ref Range: 5 - 40 mg/dL 22   LDL, calculated Latest Ref Range: 0 - 99 mg/dL 81   Hemoglobin A1c, (calculated) Latest Ref Range: 4.8 - 5.6 % 6.0 (H)   VITAMIN D, 25-HYDROXY Latest Ref Range: 30.0 - 100.0 ng/mL 21.7 (L)       ASSESSMENT / PLAN    ICD-10-CM ICD-9-CM    1. Essential hypertension with goal blood pressure less than 140/90  I10 401.9    2. Prediabetes  R73.03 790.29    3. Hyperlipidemia, unspecified hyperlipidemia type  E78.5 272.4    4. Major depressive disorder with single episode, in partial remission ([facility]  F32.4 296.25    5. Obesity with serious comorbidity, unspecified classification, unspecified obesity type  E66.9 278.00    6.  Gastroesophageal reflux disease without esophagitis  K21.9 530.81    7. Chronic daily headache  R51 784.0    8. DB on CPAP  G47.33 327.23     Z99.89 V46.8    9. Benign prostatic hyperplasia, unspecified whether lower urinary tract symptoms present  N40.0 600.00    10. Kidney stone  N20.0 592.0    11. Vitamin D deficiency  E55.9 268.9      HTN / prediabetes / hyperlipidemia / obesity - cont current care. Reviewed labs. Diet and exercise. Discussed prediabetes and the need for carb reduction and exercise. Pt ed handouts. Depression - controlled on Zoloft 100mg daily. GERD - cont PPI as needed. Chronic daily HAs / DB - has seen the sleep specialist.  HAs seem to be controlled. BPH / kidney stones - reviewed urology notes. I did ask him to ask his urologist at his next ov to address the kidney stones. Vit D def - supplementation sent in with vit D 50,000iu weekly. All chart history elements were reviewed by me at the time of the visit even though marked at time of note closure. Patient understands our medical plan. Patient has provided input and agrees with goals. Alternatives have been explained and offered. All questions answered. The patient is to call if condition worsens or fails to improve. Follow-up and Dispositions    · Return in about 6 months (around 1/8/2021) for routine care. A1c to be done in the office.

## 2020-07-26 DIAGNOSIS — K21.9 GASTROESOPHAGEAL REFLUX DISEASE WITHOUT ESOPHAGITIS: ICD-10-CM

## 2020-07-27 RX ORDER — OMEPRAZOLE 40 MG/1
CAPSULE, DELAYED RELEASE ORAL
Qty: 90 CAP | Refills: 1 | Status: SHIPPED | OUTPATIENT
Start: 2020-07-27 | End: 2021-01-18

## 2020-07-31 ENCOUNTER — OFFICE VISIT (OUTPATIENT)
Dept: CARDIOLOGY CLINIC | Age: 55
End: 2020-07-31

## 2020-07-31 VITALS
WEIGHT: 248 LBS | HEART RATE: 90 BPM | BODY MASS INDEX: 36.73 KG/M2 | DIASTOLIC BLOOD PRESSURE: 85 MMHG | SYSTOLIC BLOOD PRESSURE: 133 MMHG | HEIGHT: 69 IN | TEMPERATURE: 98.1 F

## 2020-07-31 DIAGNOSIS — I10 ESSENTIAL HYPERTENSION WITH GOAL BLOOD PRESSURE LESS THAN 140/90: ICD-10-CM

## 2020-07-31 DIAGNOSIS — E78.5 HYPERLIPIDEMIA, UNSPECIFIED HYPERLIPIDEMIA TYPE: ICD-10-CM

## 2020-07-31 DIAGNOSIS — G47.33 OSA ON CPAP: ICD-10-CM

## 2020-07-31 DIAGNOSIS — Z99.89 OSA ON CPAP: ICD-10-CM

## 2020-07-31 DIAGNOSIS — R55 VASOVAGAL SYNCOPE: ICD-10-CM

## 2020-07-31 DIAGNOSIS — Z01.810 PREOP CARDIOVASCULAR EXAM: ICD-10-CM

## 2020-07-31 DIAGNOSIS — I25.111 CORONARY ARTERY DISEASE INVOLVING NATIVE CORONARY ARTERY OF NATIVE HEART WITH ANGINA PECTORIS WITH DOCUMENTED SPASM (HCC): Primary | ICD-10-CM

## 2020-07-31 NOTE — PROGRESS NOTES
HISTORY OF PRESENT ILLNESS  Taye Meyers is a 60 y.o. male. Patient with cad,coronary spasm,htn  On follow up patient denies any sob, palpitation or other significant symptoms. 2/2020  Seen for follow-up after recent admission. Episode of syncope started while he was standing in line for longer time started with dizziness subsequent syncope. Occasional lightheaded when standing in 1 position. He had positive tilt table in 2013. No recent syncopal episodes. Hypertension   The history is provided by the patient. This is a chronic problem. The problem occurs constantly. The problem has not changed since onset. Associated symptoms include chest pain. Cholesterol Problem   The history is provided by the patient. This is a chronic problem. The problem occurs constantly. The problem has not changed since onset. Associated symptoms include chest pain. Chest Pain (Angina)    The history is provided by the patient. This is a recurrent problem. The problem has been gradually worsening. The problem occurs every several days. The pain is associated with exertion and rest. The pain is present in the substernal region. The pain is mild. The quality of the pain is described as pressure-like. The pain does not radiate. Pertinent negatives include no claudication, no cough, no dizziness, no fever, no hemoptysis, no nausea, no orthopnea, no palpitations, no PND, no sputum production, no vomiting and no weakness. He has tried nitroglycerin for the symptoms. Review of Systems   Constitutional: Negative for chills and fever. HENT: Negative for nosebleeds. Eyes: Negative for blurred vision and double vision. Respiratory: Negative for cough, hemoptysis, sputum production and wheezing. Cardiovascular: Positive for chest pain. Negative for palpitations, orthopnea, claudication, leg swelling and PND. Gastrointestinal: Negative for heartburn, nausea and vomiting. Musculoskeletal: Negative for myalgias. Skin: Negative for rash. Neurological: Negative for dizziness and weakness. Endo/Heme/Allergies: Does not bruise/bleed easily. Family History   Problem Relation Age of Onset    Hypertension Mother     Diabetes Mother     Hypertension Father     Cancer Father         Prostate    Heart Disease Father     Heart Attack Father     Stroke Brother     Migraines Brother     Cancer Sister         Breast    Migraines Sister     Stroke Brother        Past Medical History:   Diagnosis Date    Abnormal tilt table test 2013    Back pain     CAD (coronary artery disease)     Coronary artery spasm (HCC)     GERD (gastroesophageal reflux disease)     Hypertension     Kidney stone     Migraine     migraines    Prediabetes     Sleep apnea     uses cpap    Syncope     has had recurrent issues, has had work-up to include EEG, monitor, tilt table, Carotids, ECHO       Past Surgical History:   Procedure Laterality Date    CARDIAC SURG PROCEDURE UNLIST      cardiac catheterization    COLONOSCOPY N/A 9/30/2016    COLONOSCOPY with biopsy performed by Lauren Daniel [facility] at [facility] ENDOSCOPY    HX BACK SURGERY      x3    HX ENDOSCOPY      reportedly normal by patient    HX HEENT      sinus sx    HX ORTHOPAEDIC      left shoulder, right knee, left thumb, left great toe sx    HX UROLOGICAL      Lithotripsy       Social History     Tobacco Use    Smoking status: Never Smoker    Smokeless tobacco: Never Used   Substance Use Topics    Alcohol use: Yes     Frequency: Monthly or less     Drinks per session: 1 or 2     Binge frequency: Never     Comment: rare use       No Known Allergies        Visit Vitals  /85   Pulse 90   Temp 98.1 °F (36.7 °C) (Temporal)   Ht 5' 9\" (1.753 m)   Wt 112.5 kg (248 lb)   BMI 36.62 kg/m²         Physical Exam   Constitutional: He is oriented to person, place, and time. He appears well-developed and well-nourished. HENT:   Head: Normocephalic and atraumatic.    Eyes: Conjunctivae are normal.   Neck: Neck supple. No JVD present. No tracheal deviation present. No thyromegaly present. Cardiovascular: Normal rate, regular rhythm and normal heart sounds. Exam reveals no gallop and no friction rub. No murmur heard. Pulmonary/Chest: Breath sounds normal. No respiratory distress. He has no wheezes. He has no rales. He exhibits no tenderness. Abdominal: Soft. There is no abdominal tenderness. Musculoskeletal:         General: No edema. Neurological: He is alert and oriented to person, place, and time. Skin: Skin is warm and dry. Psychiatric: He has a normal mood and affect. Mr. Justin Marques has a reminder for a \"due or due soon\" health maintenance. I have asked that he contact his primary care provider for follow-up on this health maintenance. Cath-2004  rca spasm-  lcx 30-40%  Stress test-2015  Fixed ant defect  Echo-2015  Normal lvef  ekg-7/2016-  wnl  I Have personally reviewed recent relevant labs available and discussed with patient  5/2019-BMP, LFT, lipids. LDL-49    Echo Cardiogram Complete2/3/2020  [address]  Component Name Value Ref Range   EF Echo 60     Result Impression   :   NORMAL LEFT VENTRICULAR CAVITY SIZE AND SYSTOLIC FUNCTION WITH AN EJECTION FRACTION OF  60 %. ABNORMAL DIASTOLIC FILLING PATTERN. MITRAL ANNULAR CALCIFICATION WITH MILD MITRAL REGURGITATION. SCLEROTIC TRILEAFLET AORTIC VALVE WITHOUT EVIDENCE OF STENOSIS. TRACE OF TRICUSPID REGURGITATION WITH A RVSP OF 38 MMHG. STRUCTURALLY NORMAL PULMONIC VALVE WITH TRACE PULMONIC REGURGITATION. NO EVIDENCE OF PERICARDIAL EFFUSION. NO MASSES, SHUNTS OR THROMBI SEEN. NO PREVIOUS REPORT FOR COMPARISON. 2/2020- carotid artery PVL  Conclusions: Normal extracranial carotid and vertebral duplex examination. Assessment         ICD-10-CM ICD-9-CM    1.  Coronary artery disease involving native coronary artery of native heart with angina pectoris with documented spasm ([facility] [address] [facility] I25.111 414.01      413.9     Stable continue current medical management   2. Essential hypertension with goal blood pressure less than 140/90  I10 401.9     Stable monitor   3. Hyperlipidemia, unspecified hyperlipidemia type  E78.5 272.4     Continue treatment monitor   4. Vasovagal syncope  R55 780.2     Stable continue monitoring   5. DB on CPAP  G47.33 327.23     Z99.89 V46.8     Continue treatment   6. Preop cardiovascular exam  Z01.810 V72.81     Stable cardiac status okay to proceed with surgery as planned with medium cardiac risk   Had tried cardizem in past -had drop in bp  No acei/normal lvef  2/2019  Angina significantly improved on addition of Imdur. Blood pressure controlled continue medical management  In 2019  Cardiac status stable. Angina stable. He has orthostatic dizziness. Likely from neuropathy. Will use support stocking. Consider additional medication if required if symptoms are persistent  2/2020  Recent syncope clinically vasovagal.  History of positive tilt table in 2013. Will discontinue amlodipine and monitor as blood pressure on low normal side. Monitor for angina and spasm. Continue with support stocking. Pressure maneuvers and if needed midodrine  7/2020  Cardiac status stable. Okay to proceed with knee surgery with medium cardiac risk. No recurrence of syncope or angina        Medications Discontinued During This Encounter   Medication Reason    tamsulosin (FLOMAX) 0.4 mg capsule Therapy Completed    isosorbide mononitrate ER (IMDUR) 60 mg CR tablet Dose Adjustment       No orders of the defined types were placed in this encounter. Follow-up and Dispositions    · Return in about 6 months (around 1/31/2021) for Cleared for planned surgery, moderate risk.

## 2020-08-05 ENCOUNTER — TELEPHONE (OUTPATIENT)
Dept: FAMILY MEDICINE CLINIC | Age: 55
End: 2020-08-05

## 2020-08-06 ENCOUNTER — TELEPHONE (OUTPATIENT)
Dept: FAMILY MEDICINE CLINIC | Age: 55
End: 2020-08-06

## 2020-08-06 ENCOUNTER — OFFICE VISIT (OUTPATIENT)
Dept: FAMILY MEDICINE CLINIC | Age: 55
End: 2020-08-06

## 2020-08-06 VITALS
BODY MASS INDEX: 36.43 KG/M2 | DIASTOLIC BLOOD PRESSURE: 80 MMHG | SYSTOLIC BLOOD PRESSURE: 120 MMHG | RESPIRATION RATE: 16 BRPM | OXYGEN SATURATION: 97 % | TEMPERATURE: 98.4 F | WEIGHT: 246 LBS | HEART RATE: 101 BPM | HEIGHT: 69 IN

## 2020-08-06 DIAGNOSIS — F32.4 MAJOR DEPRESSIVE DISORDER WITH SINGLE EPISODE, IN PARTIAL REMISSION (HCC): ICD-10-CM

## 2020-08-06 DIAGNOSIS — R73.03 PREDIABETES: ICD-10-CM

## 2020-08-06 DIAGNOSIS — E78.5 HYPERLIPIDEMIA, UNSPECIFIED HYPERLIPIDEMIA TYPE: ICD-10-CM

## 2020-08-06 DIAGNOSIS — E66.9 OBESITY WITH SERIOUS COMORBIDITY, UNSPECIFIED CLASSIFICATION, UNSPECIFIED OBESITY TYPE: ICD-10-CM

## 2020-08-06 DIAGNOSIS — Z99.89 OSA ON CPAP: ICD-10-CM

## 2020-08-06 DIAGNOSIS — G47.33 OSA ON CPAP: ICD-10-CM

## 2020-08-06 DIAGNOSIS — K21.9 GASTROESOPHAGEAL REFLUX DISEASE WITHOUT ESOPHAGITIS: ICD-10-CM

## 2020-08-06 DIAGNOSIS — N20.0 KIDNEY STONE: ICD-10-CM

## 2020-08-06 DIAGNOSIS — N40.0 BENIGN PROSTATIC HYPERPLASIA, UNSPECIFIED WHETHER LOWER URINARY TRACT SYMPTOMS PRESENT: ICD-10-CM

## 2020-08-06 DIAGNOSIS — Z01.818 PREOPERATIVE CLEARANCE: ICD-10-CM

## 2020-08-06 DIAGNOSIS — I10 ESSENTIAL HYPERTENSION WITH GOAL BLOOD PRESSURE LESS THAN 140/90: ICD-10-CM

## 2020-08-06 DIAGNOSIS — R51.9 CHRONIC DAILY HEADACHE: ICD-10-CM

## 2020-08-06 DIAGNOSIS — M17.11 ARTHRITIS OF RIGHT KNEE: Primary | ICD-10-CM

## 2020-08-06 NOTE — PROGRESS NOTES
Chief Complaint   Patient presents with    Pre-op Exam      Right TKR to be performed by Dr. Martha Suarez at [facility] on 2020 under general anesthesia     Preoperative Evaluation    Date of Exam: 2020    Chyao Cuevas is a 60 y.o. male (:1965) who presents for preoperative evaluation. Procedure/Surgery:   right TKR  Date of Procedure/Surgery: 2020  Surgeon: Dr. De La Cruz [place]: [facility]  Primary Physician: Monica Leahy MD  Latex Allergy: no    Medical History:     Past Medical History:   Diagnosis Date    Abnormal tilt table test     Back pain     CAD (coronary artery disease)     Coronary artery spasm ([address]     GERD (gastroesophageal reflux disease)     Hypertension     Kidney stone     Migraine     migraines    Prediabetes     Sleep apnea     uses cpap    Syncope     has had recurrent issues, has had work-up to include EEG, monitor, tilt table, Carotids, ECHO     Allergies:   No Known Allergies   Medications:     Current Outpatient Medications   Medication Sig    omeprazole (PRILOSEC) 40 mg capsule TAKE 1 CAPSULE BY MOUTH  DAILY    ergocalciferol (ERGOCALCIFEROL) 1,250 mcg (50,000 unit) capsule Take 1 Cap by mouth every seven (7) days.  rosuvastatin (CRESTOR) 10 mg tablet TAKE 1 TABLET BY MOUTH  NIGHTLY    sertraline (ZOLOFT) 100 mg tablet TAKE 1 TABLET BY MOUTH  DAILY    amLODIPine (NORVASC) 5 mg tablet TAKE 1 TABLET BY MOUTH  DAILY    isosorbide mononitrate ER (IMDUR) 30 mg tablet Take  by mouth daily.  latanoprost (XALATAN) 0.005 % ophthalmic solution Administer 1 Drop to both eyes daily.  nortriptyline (PAMELOR) 25 mg capsule Take 1 Cap by mouth nightly. Indications: headaches    tamsulosin (FLOMAX) 0.4 mg capsule Take 1 Cap by mouth daily.  nitroglycerin (NITROLINGUAL) 400 mcg/spray spray 1 Spray by SubLINGual route every five (5) minutes as needed for Chest Pain for up to 3 doses.     aspirin delayed-release 81 mg tablet Take 81 mg by mouth daily.    multivitamin (ONE A DAY) tablet Take 1 Tab by mouth daily.  cpap machine kit by Does Not Apply route. CPAP at 11 cm with humidifier. Mask: Wisp nasal, large. Length of need 99 months. Replace mask and accessories as needed times 12 months. Download data at 30 days and fax at [phone]. Dx- DB on CPAP, RLS, Obesity (Patient taking differently: by Does Not Apply route. CPAP at 11 cm with humidifier. Mask: Wisp nasal, large. Length of need 99 months. Replace mask and accessories as needed times 12 months. Download data at 30 days and fax at [phone]. Dx- DB on CPAP, RLS, Obesity  [facility])    B.infantis-B.ani-B.long-B.bifi (Probiotic 4X) 10-15 mg TbEC Take  by mouth daily. No current facility-administered medications for this visit.       Surgical History:     Past Surgical History:   Procedure Laterality Date    CARDIAC SURG PROCEDURE UNLIST      cardiac catheterization    COLONOSCOPY N/A 9/30/2016    COLONOSCOPY with biopsy performed by Valdez Valladares MD at [facility] END[facility]COPY    HX BACK SURGERY      x3    HX ENDOSCOPY      reportedly normal by patient    HX HEENT      sinus sx    HX ORTHOPAEDIC      left shoulder, right knee, left thumb, left great toe sx    HX UROLOGICAL      Lithotripsy     Social History:     Social History     Socioeconomic History    Marital status:      Spouse name: Not on file    Number of children: Not on file    Years of education: Not on file    Highest education level: Not on file   Occupational History    Occupation:    Tobacco Use    Smoking status: Never Smoker    Smokeless tobacco: Never Used   Substance and Sexual Activity    Alcohol use: Yes     Frequency: Monthly or less     Drinks per session: 1 or 2     Binge frequency: Never     Comment: rare use    Drug use: No    Sexual activity: Yes     Partners: Female   Social History Narrative    ** Merged History Encounter **            Recent use of: No recent use of aspirin (ASA), NSAIDS or steroids    Tetanus up to date: tetanus status unknown to the patient      Anesthesia Complications: None  History of abnormal bleeding : None  History of Blood Transfusions: no    REVIEW OF SYSTEMS:  A comprehensive review of systems was negative except for that written in the HPI. EXAM:   Visit Vitals  /90 (BP 1 Location: Right arm, BP Patient Position: Sitting)   Pulse (!) 101   Temp 98.4 °F (36.9 °C) (Oral)   Resp 16   Ht 5' 9\" (1.753 m)   Wt 246 lb (111.6 kg)   SpO2 97%   BMI 36.33 kg/m²     General appearance - alert, well appearing, and in no distress  Eyes - pupils equal and reactive, extraocular eye movements intact  Ears - right ear normal, left ear normal  Nose - normal and patent, no erythema, discharge or polyps  Mouth - mucous membranes moist, pharynx normal without lesions  Neck - supple, no significant adenopathy  Lymphatics - no palpable lymphadenopathy  Chest - clear to auscultation, no wheezes, rales or rhonchi, symmetric air entry  Heart - normal rate, regular rhythm, normal S1, S2, no murmurs, rubs, clicks or gallops  Abdomen - soft, nontender, nondistended, no masses or organomegaly  Neurological - motor and sensory grossly normal bilaterally  Extremities - no pedal edema, no clubbing or cyanosis  Skin - normal coloration and turgor, no rashes, no suspicious skin lesions noted      DIAGNOSTICS:   Pre-op testing pending at this time    IMPRESSION:     ICD-10-CM ICD-9-CM    1. Arthritis of right knee  M17.11 716.96    2. Preoperative clearance  Z01.818 V72.84    3. Essential hypertension with goal blood pressure less than 140/90  I10 401.9    4. Prediabetes  R73.03 790.29    5. Hyperlipidemia, unspecified hyperlipidemia type  E78.5 272.4    6. Major depressive disorder with single episode, in partial remission ([facility]  F32.4 296.25    7. Obesity with serious comorbidity, unspecified classification, unspecified obesity type  E66.9 278.00    8.  Chronic daily headache  R51 784.0    9. DB on CPAP  G47.33 327.23     Z99.89 V46.8    10. Benign prostatic hyperplasia, unspecified whether lower urinary tract symptoms present  N40.0 600.00    11. Kidney stone  N20.0 592.0    12. Gastroesophageal reflux disease without esophagitis  K21.9 530.81      Awaiting pre-op testing. Clearance pending normal testing. Cont current care of chronic illness above. Refills as needed. All chart history elements were reviewed by me at the time of the visit even though marked at time of note closure. Patient understands our medical plan. Patient has provided input and agrees with goals. Alternatives have been explained and offered. All questions answered. The patient is to call if condition worsens or fails to improve. Follow-up and Dispositions    · Return in 5 months (on 1/6/2021) for routine care. Salma Mercedes MD   8/6/2020        ADDENDUM:    Pre-op workup has returned and reviewed. EKG forwarded to cardiology for review. Other than cardiac, he is fully cleared from my end. I do want cardiology to look at his EKG and clear him if appropriate as well.      Salma Mercedes MD

## 2020-08-06 NOTE — PROGRESS NOTES
1. Have you been to the ER, urgent care clinic since your last visit? Hospitalized since your last visit? No    2. Have you seen or consulted any other health care providers outside of the [address] [place] [address] since your last visit? Include any pap smears or colon screening.  Yes Where: [facility]

## 2020-08-07 ENCOUNTER — HOSPITAL ENCOUNTER (OUTPATIENT)
Dept: PREADMISSION TESTING | Age: 55
Discharge: HOME | End: 2020-08-07
Payer: COMMERCIAL

## 2020-08-07 ENCOUNTER — HOSPITAL ENCOUNTER (OUTPATIENT)
Dept: GENERAL RADIOLOGY | Age: 55
Discharge: HOME | End: 2020-08-07
Payer: COMMERCIAL

## 2020-08-07 DIAGNOSIS — Z01.818 PRE-OP TESTING: ICD-10-CM

## 2020-08-07 LAB
ABO + RH BLD: NORMAL
ALBUMIN SERPL-MCNC: 4 G/DL (ref 3.4–5)
ALBUMIN/GLOB SERPL: 1.1 {RATIO} (ref 0.8–1.7)
ALP SERPL-CCNC: 96 U/L (ref 45–117)
ALT SERPL-CCNC: 62 U/L (ref 16–61)
ANION GAP SERPL CALC-SCNC: 6 MMOL/L (ref 3–18)
APPEARANCE UR: CLEAR
APTT PPP: 30.3 SEC (ref 23–36.4)
AST SERPL-CCNC: 37 U/L (ref 10–38)
ATRIAL RATE: 84 BPM
BILIRUB SERPL-MCNC: 0.9 MG/DL (ref 0.2–1)
BILIRUB UR QL: NEGATIVE
BLOOD GROUP ANTIBODIES SERPL: NORMAL
BUN SERPL-MCNC: 9 MG/DL (ref 7–18)
BUN/CREAT SERPL: 10 (ref 12–20)
CALCIUM SERPL-MCNC: 9.2 MG/DL (ref 8.5–10.1)
CALCULATED P AXIS, ECG09: 35 DEGREES
CALCULATED R AXIS, ECG10: 0 DEGREES
CALCULATED T AXIS, ECG11: 25 DEGREES
CHLORIDE SERPL-SCNC: 106 MMOL/L (ref 100–111)
CO2 SERPL-SCNC: 28 MMOL/L (ref 21–32)
COLOR UR: YELLOW
CREAT SERPL-MCNC: 0.92 MG/DL (ref 0.6–1.3)
DIAGNOSIS, 93000: NORMAL
ERYTHROCYTE [DISTWIDTH] IN BLOOD BY AUTOMATED COUNT: 14.5 % (ref 11.6–14.5)
GLOBULIN SER CALC-MCNC: 3.6 G/DL (ref 2–4)
GLUCOSE SERPL-MCNC: 92 MG/DL (ref 74–99)
GLUCOSE UR STRIP.AUTO-MCNC: NEGATIVE MG/DL
HCT VFR BLD AUTO: 42.5 % (ref 36–48)
HGB BLD-MCNC: 14.7 G/DL (ref 13–16)
HGB UR QL STRIP: NEGATIVE
INR PPP: 0.9 (ref 0.8–1.2)
KETONES UR QL STRIP.AUTO: NEGATIVE MG/DL
LEUKOCYTE ESTERASE UR QL STRIP.AUTO: NEGATIVE
MCH RBC QN AUTO: 29.1 PG (ref 24–34)
MCHC RBC AUTO-ENTMCNC: 34.6 G/DL (ref 31–37)
MCV RBC AUTO: 84 FL (ref 74–97)
NITRITE UR QL STRIP.AUTO: NEGATIVE
P-R INTERVAL, ECG05: 130 MS
PH UR STRIP: 6.5 [PH] (ref 5–8)
PLATELET # BLD AUTO: 244 K/UL (ref 135–420)
PMV BLD AUTO: 10.1 FL (ref 9.2–11.8)
POTASSIUM SERPL-SCNC: 4 MMOL/L (ref 3.5–5.5)
PROT SERPL-MCNC: 7.6 G/DL (ref 6.4–8.2)
PROT UR STRIP-MCNC: NEGATIVE MG/DL
PROTHROMBIN TIME: 12.1 SEC (ref 11.5–15.2)
Q-T INTERVAL, ECG07: 376 MS
QRS DURATION, ECG06: 94 MS
QTC CALCULATION (BEZET), ECG08: 444 MS
RBC # BLD AUTO: 5.06 M/UL (ref 4.7–5.5)
SODIUM SERPL-SCNC: 140 MMOL/L (ref 136–145)
SP GR UR REFRACTOMETRY: 1.02 (ref 1–1.03)
SPECIMEN EXP DATE BLD: NORMAL
UROBILINOGEN UR QL STRIP.AUTO: 0.2 EU/DL (ref 0.2–1)
VENTRICULAR RATE, ECG03: 84 BPM
WBC # BLD AUTO: 6.5 K/UL (ref 4.6–13.2)

## 2020-08-07 PROCEDURE — 81003 URINALYSIS AUTO W/O SCOPE: CPT

## 2020-08-07 PROCEDURE — 87086 URINE CULTURE/COLONY COUNT: CPT

## 2020-08-07 PROCEDURE — 93005 ELECTROCARDIOGRAM TRACING: CPT

## 2020-08-07 PROCEDURE — 36415 COLL VENOUS BLD VENIPUNCTURE: CPT

## 2020-08-07 PROCEDURE — 80053 COMPREHEN METABOLIC PANEL: CPT

## 2020-08-07 PROCEDURE — 71046 X-RAY EXAM CHEST 2 VIEWS: CPT

## 2020-08-07 PROCEDURE — 85730 THROMBOPLASTIN TIME PARTIAL: CPT

## 2020-08-07 PROCEDURE — 85610 PROTHROMBIN TIME: CPT

## 2020-08-07 PROCEDURE — 86900 BLOOD TYPING SEROLOGIC ABO: CPT

## 2020-08-07 PROCEDURE — 85027 COMPLETE CBC AUTOMATED: CPT

## 2020-08-07 PROCEDURE — 87635 SARS-COV-2 COVID-19 AMP PRB: CPT

## 2020-08-08 LAB
BACTERIA SPEC CULT: NORMAL
SARS-COV-2, COV2NT: NOT DETECTED
SERVICE CMNT-IMP: NORMAL

## 2020-08-10 DIAGNOSIS — R51.9 CHRONIC DAILY HEADACHE: ICD-10-CM

## 2020-08-11 RX ORDER — NORTRIPTYLINE HYDROCHLORIDE 25 MG/1
CAPSULE ORAL
Qty: 90 CAP | Refills: 1 | Status: SHIPPED | OUTPATIENT
Start: 2020-08-11 | End: 2020-12-24

## 2020-08-12 ENCOUNTER — ANESTHESIA EVENT (OUTPATIENT)
Dept: SURGERY | Age: 55
DRG: 470 | End: 2020-08-12
Payer: COMMERCIAL

## 2020-08-13 ENCOUNTER — APPOINTMENT (OUTPATIENT)
Dept: GENERAL RADIOLOGY | Age: 55
DRG: 470 | End: 2020-08-13
Attending: ORTHOPAEDIC SURGERY
Payer: COMMERCIAL

## 2020-08-13 ENCOUNTER — ANESTHESIA (OUTPATIENT)
Dept: SURGERY | Age: 55
DRG: 470 | End: 2020-08-13
Payer: COMMERCIAL

## 2020-08-13 ENCOUNTER — HOSPITAL ENCOUNTER (INPATIENT)
Age: 55
LOS: 1 days | Discharge: HOME HEALTH | DRG: 470 | End: 2020-08-14
Attending: ORTHOPAEDIC SURGERY | Admitting: ORTHOPAEDIC SURGERY
Payer: COMMERCIAL

## 2020-08-13 PROBLEM — Z96.651 TOTAL KNEE REPLACEMENT STATUS, RIGHT: Status: ACTIVE | Noted: 2020-08-13

## 2020-08-13 PROCEDURE — 76010000173 HC OR TIME 3 TO 3.5 HR INTENSV-TIER 1: Performed by: ORTHOPAEDIC SURGERY

## 2020-08-13 PROCEDURE — 76210000016 HC OR PH I REC 1 TO 1.5 HR: Performed by: ORTHOPAEDIC SURGERY

## 2020-08-13 PROCEDURE — 77030027138 HC INCENT SPIROMETER -A: Performed by: ORTHOPAEDIC SURGERY

## 2020-08-13 PROCEDURE — 77030008683 HC TU ET CUF COVD -A: Performed by: NURSE ANESTHETIST, CERTIFIED REGISTERED

## 2020-08-13 PROCEDURE — 74011000250 HC RX REV CODE- 250: Performed by: ORTHOPAEDIC SURGERY

## 2020-08-13 PROCEDURE — 77030013708 HC HNDPC SUC IRR PULS STRY –B: Performed by: ORTHOPAEDIC SURGERY

## 2020-08-13 PROCEDURE — C1776 JOINT DEVICE (IMPLANTABLE): HCPCS | Performed by: ORTHOPAEDIC SURGERY

## 2020-08-13 PROCEDURE — 74011000258 HC RX REV CODE- 258

## 2020-08-13 PROCEDURE — 74011000258 HC RX REV CODE- 258: Performed by: ORTHOPAEDIC SURGERY

## 2020-08-13 PROCEDURE — 77010033678 HC OXYGEN DAILY

## 2020-08-13 PROCEDURE — 77030040922 HC BLNKT HYPOTHRM STRY -A: Performed by: ORTHOPAEDIC SURGERY

## 2020-08-13 PROCEDURE — 76060000037 HC ANESTHESIA 3 TO 3.5 HR: Performed by: ORTHOPAEDIC SURGERY

## 2020-08-13 PROCEDURE — 77030002933 HC SUT MCRYL J&J -A: Performed by: ORTHOPAEDIC SURGERY

## 2020-08-13 PROCEDURE — 65270000029 HC RM PRIVATE

## 2020-08-13 PROCEDURE — 77030040361 HC SLV COMPR DVT MDII -B: Performed by: ORTHOPAEDIC SURGERY

## 2020-08-13 PROCEDURE — 74011250636 HC RX REV CODE- 250/636: Performed by: NURSE ANESTHETIST, CERTIFIED REGISTERED

## 2020-08-13 PROCEDURE — 77030031139 HC SUT VCRL2 J&J -A: Performed by: ORTHOPAEDIC SURGERY

## 2020-08-13 PROCEDURE — 73560 X-RAY EXAM OF KNEE 1 OR 2: CPT

## 2020-08-13 PROCEDURE — 74011250637 HC RX REV CODE- 250/637: Performed by: ORTHOPAEDIC SURGERY

## 2020-08-13 PROCEDURE — 77030006802 HC BLD SAW RECIP BRSM -B: Performed by: ORTHOPAEDIC SURGERY

## 2020-08-13 PROCEDURE — 77030026438 HC STYL ET INTUB CARD -A: Performed by: NURSE ANESTHETIST, CERTIFIED REGISTERED

## 2020-08-13 PROCEDURE — 77030010785: Performed by: ORTHOPAEDIC SURGERY

## 2020-08-13 PROCEDURE — 97116 GAIT TRAINING THERAPY: CPT

## 2020-08-13 PROCEDURE — 97161 PT EVAL LOW COMPLEX 20 MIN: CPT

## 2020-08-13 PROCEDURE — 74011000258 HC RX REV CODE- 258: Performed by: NURSE ANESTHETIST, CERTIFIED REGISTERED

## 2020-08-13 PROCEDURE — 74011000250 HC RX REV CODE- 250: Performed by: NURSE ANESTHETIST, CERTIFIED REGISTERED

## 2020-08-13 PROCEDURE — 77030027138 HC INCENT SPIROMETER -A

## 2020-08-13 PROCEDURE — 77030000032 HC CUF TRNQT ZIMM -B: Performed by: ORTHOPAEDIC SURGERY

## 2020-08-13 PROCEDURE — 74011250636 HC RX REV CODE- 250/636: Performed by: ANESTHESIOLOGY

## 2020-08-13 PROCEDURE — 74011250636 HC RX REV CODE- 250/636: Performed by: ORTHOPAEDIC SURGERY

## 2020-08-13 PROCEDURE — 77030006835 HC BLD SAW SAG STRY -B: Performed by: ORTHOPAEDIC SURGERY

## 2020-08-13 PROCEDURE — C9290 INJ, BUPIVACAINE LIPOSOME: HCPCS | Performed by: ORTHOPAEDIC SURGERY

## 2020-08-13 PROCEDURE — 74011250636 HC RX REV CODE- 250/636

## 2020-08-13 PROCEDURE — 77030003666 HC NDL SPINAL BD -A: Performed by: ORTHOPAEDIC SURGERY

## 2020-08-13 PROCEDURE — 77030022295 HC SEAL BPLR VSL DISP MEDT -F: Performed by: ORTHOPAEDIC SURGERY

## 2020-08-13 PROCEDURE — 76942 ECHO GUIDE FOR BIOPSY: CPT | Performed by: ANESTHESIOLOGY

## 2020-08-13 PROCEDURE — 77030019605: Performed by: ORTHOPAEDIC SURGERY

## 2020-08-13 PROCEDURE — 64450 NJX AA&/STRD OTHER PN/BRANCH: CPT | Performed by: ANESTHESIOLOGY

## 2020-08-13 PROCEDURE — 0SRC0J9 REPLACEMENT OF RIGHT KNEE JOINT WITH SYNTHETIC SUBSTITUTE, CEMENTED, OPEN APPROACH: ICD-10-PCS | Performed by: ORTHOPAEDIC SURGERY

## 2020-08-13 PROCEDURE — 74011000250 HC RX REV CODE- 250

## 2020-08-13 PROCEDURE — 77030033067 HC SUT PDO STRATFX SPIR J&J -B: Performed by: ORTHOPAEDIC SURGERY

## 2020-08-13 PROCEDURE — 77030012890: Performed by: ORTHOPAEDIC SURGERY

## 2020-08-13 PROCEDURE — 77030013079 HC BLNKT BAIR HGGR 3M -A: Performed by: NURSE ANESTHETIST, CERTIFIED REGISTERED

## 2020-08-13 DEVICE — PAT ASYM TRIATHLN X3 35X10MM -- TRIATHLON ASYMMETRIC X3: Type: IMPLANTABLE DEVICE | Site: KNEE | Status: FUNCTIONAL

## 2020-08-13 DEVICE — INSERT TIB BEAR SZ 6 THK11MM KNEE X3 POST STBL TRIATHLON: Type: IMPLANTABLE DEVICE | Site: KNEE | Status: FUNCTIONAL

## 2020-08-13 DEVICE — COMPNT FEM POST STBL 5 R --: Type: IMPLANTABLE DEVICE | Site: KNEE | Status: FUNCTIONAL

## 2020-08-13 DEVICE — BASEPLT TIB REV UNIV SZ 6 R/L -- TRIATHLON: Type: IMPLANTABLE DEVICE | Site: KNEE | Status: FUNCTIONAL

## 2020-08-13 DEVICE — CEMENT BNE 20ML 41GM FULL DOSE PMMA W/ TOBRA M VISC RADPQ: Type: IMPLANTABLE DEVICE | Site: KNEE | Status: FUNCTIONAL

## 2020-08-13 RX ORDER — LIDOCAINE HYDROCHLORIDE 20 MG/ML
INJECTION, SOLUTION EPIDURAL; INFILTRATION; INTRACAUDAL; PERINEURAL AS NEEDED
Status: DISCONTINUED | OUTPATIENT
Start: 2020-08-13 | End: 2020-08-13 | Stop reason: HOSPADM

## 2020-08-13 RX ORDER — ONDANSETRON 2 MG/ML
4 INJECTION INTRAMUSCULAR; INTRAVENOUS ONCE
Status: DISCONTINUED | OUTPATIENT
Start: 2020-08-13 | End: 2020-08-13 | Stop reason: HOSPADM

## 2020-08-13 RX ORDER — SODIUM CHLORIDE, SODIUM LACTATE, POTASSIUM CHLORIDE, CALCIUM CHLORIDE 600; 310; 30; 20 MG/100ML; MG/100ML; MG/100ML; MG/100ML
75 INJECTION, SOLUTION INTRAVENOUS CONTINUOUS
Status: DISCONTINUED | OUTPATIENT
Start: 2020-08-13 | End: 2020-08-13 | Stop reason: HOSPADM

## 2020-08-13 RX ORDER — DIPHENHYDRAMINE HYDROCHLORIDE 50 MG/ML
12.5 INJECTION, SOLUTION INTRAMUSCULAR; INTRAVENOUS
Status: DISCONTINUED | OUTPATIENT
Start: 2020-08-13 | End: 2020-08-13 | Stop reason: HOSPADM

## 2020-08-13 RX ORDER — DIPHENHYDRAMINE HCL 25 MG
25 CAPSULE ORAL
Status: DISCONTINUED | OUTPATIENT
Start: 2020-08-13 | End: 2020-08-14 | Stop reason: HOSPADM

## 2020-08-13 RX ORDER — HYDROMORPHONE HYDROCHLORIDE 1 MG/ML
1 INJECTION, SOLUTION INTRAMUSCULAR; INTRAVENOUS; SUBCUTANEOUS
Status: DISCONTINUED | OUTPATIENT
Start: 2020-08-13 | End: 2020-08-14 | Stop reason: HOSPADM

## 2020-08-13 RX ORDER — LIDOCAINE HYDROCHLORIDE 10 MG/ML
0.1 INJECTION, SOLUTION EPIDURAL; INFILTRATION; INTRACAUDAL; PERINEURAL AS NEEDED
Status: DISCONTINUED | OUTPATIENT
Start: 2020-08-13 | End: 2020-08-13 | Stop reason: HOSPADM

## 2020-08-13 RX ORDER — TAMSULOSIN HYDROCHLORIDE 0.4 MG/1
0.4 CAPSULE ORAL DAILY
Status: DISCONTINUED | OUTPATIENT
Start: 2020-08-14 | End: 2020-08-14 | Stop reason: HOSPADM

## 2020-08-13 RX ORDER — FACIAL-BODY WIPES
10 EACH TOPICAL DAILY PRN
Status: DISCONTINUED | OUTPATIENT
Start: 2020-08-13 | End: 2020-08-14 | Stop reason: HOSPADM

## 2020-08-13 RX ORDER — SODIUM CHLORIDE 0.9 % (FLUSH) 0.9 %
5-40 SYRINGE (ML) INJECTION AS NEEDED
Status: DISCONTINUED | OUTPATIENT
Start: 2020-08-13 | End: 2020-08-13 | Stop reason: HOSPADM

## 2020-08-13 RX ORDER — NALOXONE HYDROCHLORIDE 0.4 MG/ML
0.4 INJECTION, SOLUTION INTRAMUSCULAR; INTRAVENOUS; SUBCUTANEOUS AS NEEDED
Status: DISCONTINUED | OUTPATIENT
Start: 2020-08-13 | End: 2020-08-14 | Stop reason: HOSPADM

## 2020-08-13 RX ORDER — SERTRALINE HYDROCHLORIDE 50 MG/1
100 TABLET, FILM COATED ORAL DAILY
Status: DISCONTINUED | OUTPATIENT
Start: 2020-08-14 | End: 2020-08-14 | Stop reason: HOSPADM

## 2020-08-13 RX ORDER — AMOXICILLIN 250 MG
1 CAPSULE ORAL 2 TIMES DAILY
Status: DISCONTINUED | OUTPATIENT
Start: 2020-08-13 | End: 2020-08-14 | Stop reason: HOSPADM

## 2020-08-13 RX ORDER — LATANOPROST 50 UG/ML
1 SOLUTION/ DROPS OPHTHALMIC DAILY
Status: DISCONTINUED | OUTPATIENT
Start: 2020-08-14 | End: 2020-08-14 | Stop reason: HOSPADM

## 2020-08-13 RX ORDER — ONDANSETRON 2 MG/ML
4 INJECTION INTRAMUSCULAR; INTRAVENOUS
Status: DISCONTINUED | OUTPATIENT
Start: 2020-08-13 | End: 2020-08-14 | Stop reason: HOSPADM

## 2020-08-13 RX ORDER — SODIUM CHLORIDE 0.9 % (FLUSH) 0.9 %
5-40 SYRINGE (ML) INJECTION EVERY 8 HOURS
Status: DISCONTINUED | OUTPATIENT
Start: 2020-08-13 | End: 2020-08-13 | Stop reason: HOSPADM

## 2020-08-13 RX ORDER — FENTANYL CITRATE 50 UG/ML
25 INJECTION, SOLUTION INTRAMUSCULAR; INTRAVENOUS AS NEEDED
Status: DISCONTINUED | OUTPATIENT
Start: 2020-08-13 | End: 2020-08-13 | Stop reason: HOSPADM

## 2020-08-13 RX ORDER — GLYCOPYRROLATE 0.2 MG/ML
INJECTION INTRAMUSCULAR; INTRAVENOUS AS NEEDED
Status: DISCONTINUED | OUTPATIENT
Start: 2020-08-13 | End: 2020-08-13 | Stop reason: HOSPADM

## 2020-08-13 RX ORDER — FENTANYL CITRATE 50 UG/ML
INJECTION, SOLUTION INTRAMUSCULAR; INTRAVENOUS
Status: COMPLETED | OUTPATIENT
Start: 2020-08-13 | End: 2020-08-13

## 2020-08-13 RX ORDER — ASPIRIN 325 MG
325 TABLET, DELAYED RELEASE (ENTERIC COATED) ORAL 2 TIMES DAILY
Status: DISCONTINUED | OUTPATIENT
Start: 2020-08-14 | End: 2020-08-14 | Stop reason: HOSPADM

## 2020-08-13 RX ORDER — ACETAMINOPHEN 325 MG/1
650 TABLET ORAL
Status: DISCONTINUED | OUTPATIENT
Start: 2020-08-13 | End: 2020-08-14 | Stop reason: HOSPADM

## 2020-08-13 RX ORDER — SUCCINYLCHOLINE CHLORIDE 20 MG/ML
INJECTION INTRAMUSCULAR; INTRAVENOUS AS NEEDED
Status: DISCONTINUED | OUTPATIENT
Start: 2020-08-13 | End: 2020-08-13 | Stop reason: HOSPADM

## 2020-08-13 RX ORDER — NEOSTIGMINE METHYLSULFATE 1 MG/ML
INJECTION, SOLUTION INTRAVENOUS AS NEEDED
Status: DISCONTINUED | OUTPATIENT
Start: 2020-08-13 | End: 2020-08-13 | Stop reason: HOSPADM

## 2020-08-13 RX ORDER — EPHEDRINE SULFATE/0.9% NACL/PF 25 MG/5 ML
SYRINGE (ML) INTRAVENOUS AS NEEDED
Status: DISCONTINUED | OUTPATIENT
Start: 2020-08-13 | End: 2020-08-13 | Stop reason: HOSPADM

## 2020-08-13 RX ORDER — ROPIVACAINE HYDROCHLORIDE 2 MG/ML
INJECTION, SOLUTION EPIDURAL; INFILTRATION; PERINEURAL
Status: COMPLETED | OUTPATIENT
Start: 2020-08-13 | End: 2020-08-13

## 2020-08-13 RX ORDER — MIDAZOLAM HYDROCHLORIDE 1 MG/ML
2 INJECTION, SOLUTION INTRAMUSCULAR; INTRAVENOUS ONCE
Status: COMPLETED | OUTPATIENT
Start: 2020-08-13 | End: 2020-08-13

## 2020-08-13 RX ORDER — OXYCODONE AND ACETAMINOPHEN 5; 325 MG/1; MG/1
1 TABLET ORAL AS NEEDED
Status: DISCONTINUED | OUTPATIENT
Start: 2020-08-13 | End: 2020-08-13 | Stop reason: HOSPADM

## 2020-08-13 RX ORDER — HYDROMORPHONE HYDROCHLORIDE 2 MG/1
2 TABLET ORAL
Status: DISCONTINUED | OUTPATIENT
Start: 2020-08-13 | End: 2020-08-14 | Stop reason: HOSPADM

## 2020-08-13 RX ORDER — PROPOFOL 10 MG/ML
INJECTION, EMULSION INTRAVENOUS AS NEEDED
Status: DISCONTINUED | OUTPATIENT
Start: 2020-08-13 | End: 2020-08-13 | Stop reason: HOSPADM

## 2020-08-13 RX ORDER — CEFAZOLIN SODIUM 2 G/50ML
2 SOLUTION INTRAVENOUS ONCE
Status: COMPLETED | OUTPATIENT
Start: 2020-08-13 | End: 2020-08-13

## 2020-08-13 RX ORDER — SODIUM CHLORIDE, SODIUM LACTATE, POTASSIUM CHLORIDE, CALCIUM CHLORIDE 600; 310; 30; 20 MG/100ML; MG/100ML; MG/100ML; MG/100ML
75 INJECTION, SOLUTION INTRAVENOUS CONTINUOUS
Status: DISCONTINUED | OUTPATIENT
Start: 2020-08-13 | End: 2020-08-14 | Stop reason: HOSPADM

## 2020-08-13 RX ORDER — FAMOTIDINE 20 MG/1
20 TABLET, FILM COATED ORAL ONCE
Status: DISCONTINUED | OUTPATIENT
Start: 2020-08-13 | End: 2020-08-13 | Stop reason: HOSPADM

## 2020-08-13 RX ORDER — ONDANSETRON 2 MG/ML
INJECTION INTRAMUSCULAR; INTRAVENOUS AS NEEDED
Status: DISCONTINUED | OUTPATIENT
Start: 2020-08-13 | End: 2020-08-13 | Stop reason: HOSPADM

## 2020-08-13 RX ORDER — CHLORHEXIDINE GLUCONATE 4 G/100ML
SOLUTION TOPICAL AS NEEDED
Status: DISCONTINUED | OUTPATIENT
Start: 2020-08-13 | End: 2020-08-13 | Stop reason: HOSPADM

## 2020-08-13 RX ORDER — NITROGLYCERIN 400 UG/1
1 SPRAY ORAL
Status: DISCONTINUED | OUTPATIENT
Start: 2020-08-13 | End: 2020-08-14 | Stop reason: HOSPADM

## 2020-08-13 RX ORDER — MIDAZOLAM HYDROCHLORIDE 1 MG/ML
INJECTION, SOLUTION INTRAMUSCULAR; INTRAVENOUS
Status: COMPLETED | OUTPATIENT
Start: 2020-08-13 | End: 2020-08-13

## 2020-08-13 RX ORDER — ACETAMINOPHEN 325 MG/1
650 TABLET ORAL EVERY 6 HOURS
Status: DISCONTINUED | OUTPATIENT
Start: 2020-08-13 | End: 2020-08-14 | Stop reason: HOSPADM

## 2020-08-13 RX ORDER — CEFAZOLIN SODIUM 2 G/50ML
2 SOLUTION INTRAVENOUS EVERY 8 HOURS
Status: COMPLETED | OUTPATIENT
Start: 2020-08-13 | End: 2020-08-14

## 2020-08-13 RX ORDER — ROPIVACAINE HYDROCHLORIDE 2 MG/ML
30 INJECTION, SOLUTION EPIDURAL; INFILTRATION; PERINEURAL
Status: COMPLETED | OUTPATIENT
Start: 2020-08-13 | End: 2020-08-13

## 2020-08-13 RX ORDER — SODIUM CHLORIDE, SODIUM LACTATE, POTASSIUM CHLORIDE, CALCIUM CHLORIDE 600; 310; 30; 20 MG/100ML; MG/100ML; MG/100ML; MG/100ML
25 INJECTION, SOLUTION INTRAVENOUS CONTINUOUS
Status: DISCONTINUED | OUTPATIENT
Start: 2020-08-13 | End: 2020-08-13 | Stop reason: HOSPADM

## 2020-08-13 RX ORDER — AMLODIPINE BESYLATE 5 MG/1
5 TABLET ORAL DAILY
Status: DISCONTINUED | OUTPATIENT
Start: 2020-08-14 | End: 2020-08-14 | Stop reason: HOSPADM

## 2020-08-13 RX ORDER — MIDAZOLAM HYDROCHLORIDE 1 MG/ML
INJECTION, SOLUTION INTRAMUSCULAR; INTRAVENOUS AS NEEDED
Status: DISCONTINUED | OUTPATIENT
Start: 2020-08-13 | End: 2020-08-13 | Stop reason: HOSPADM

## 2020-08-13 RX ORDER — SODIUM CHLORIDE 0.9 % (FLUSH) 0.9 %
5-40 SYRINGE (ML) INJECTION AS NEEDED
Status: DISCONTINUED | OUTPATIENT
Start: 2020-08-13 | End: 2020-08-14 | Stop reason: HOSPADM

## 2020-08-13 RX ORDER — DEXAMETHASONE SODIUM PHOSPHATE 4 MG/ML
INJECTION, SOLUTION INTRA-ARTICULAR; INTRALESIONAL; INTRAMUSCULAR; INTRAVENOUS; SOFT TISSUE AS NEEDED
Status: DISCONTINUED | OUTPATIENT
Start: 2020-08-13 | End: 2020-08-13 | Stop reason: HOSPADM

## 2020-08-13 RX ORDER — SODIUM CHLORIDE 0.9 % (FLUSH) 0.9 %
5-40 SYRINGE (ML) INJECTION EVERY 8 HOURS
Status: DISCONTINUED | OUTPATIENT
Start: 2020-08-13 | End: 2020-08-14 | Stop reason: HOSPADM

## 2020-08-13 RX ORDER — FENTANYL CITRATE 50 UG/ML
100 INJECTION, SOLUTION INTRAMUSCULAR; INTRAVENOUS ONCE
Status: COMPLETED | OUTPATIENT
Start: 2020-08-13 | End: 2020-08-13

## 2020-08-13 RX ORDER — FENTANYL CITRATE 50 UG/ML
INJECTION, SOLUTION INTRAMUSCULAR; INTRAVENOUS AS NEEDED
Status: DISCONTINUED | OUTPATIENT
Start: 2020-08-13 | End: 2020-08-13 | Stop reason: HOSPADM

## 2020-08-13 RX ORDER — ROCURONIUM BROMIDE 10 MG/ML
INJECTION, SOLUTION INTRAVENOUS AS NEEDED
Status: DISCONTINUED | OUTPATIENT
Start: 2020-08-13 | End: 2020-08-13 | Stop reason: HOSPADM

## 2020-08-13 RX ORDER — ISOSORBIDE MONONITRATE 30 MG/1
30 TABLET, EXTENDED RELEASE ORAL DAILY
Status: DISCONTINUED | OUTPATIENT
Start: 2020-08-14 | End: 2020-08-14 | Stop reason: HOSPADM

## 2020-08-13 RX ORDER — PHENYLEPHRINE HCL IN 0.9% NACL 1 MG/10 ML
SYRINGE (ML) INTRAVENOUS AS NEEDED
Status: DISCONTINUED | OUTPATIENT
Start: 2020-08-13 | End: 2020-08-13 | Stop reason: HOSPADM

## 2020-08-13 RX ORDER — OXYCODONE HYDROCHLORIDE 5 MG/1
5-10 TABLET ORAL
Status: DISCONTINUED | OUTPATIENT
Start: 2020-08-13 | End: 2020-08-14 | Stop reason: HOSPADM

## 2020-08-13 RX ADMIN — SODIUM CHLORIDE, SODIUM LACTATE, POTASSIUM CHLORIDE, AND CALCIUM CHLORIDE: 600; 310; 30; 20 INJECTION, SOLUTION INTRAVENOUS at 07:30

## 2020-08-13 RX ADMIN — LIDOCAINE HYDROCHLORIDE 0.1 ML: 10 INJECTION, SOLUTION EPIDURAL; INFILTRATION; INTRACAUDAL; PERINEURAL at 07:14

## 2020-08-13 RX ADMIN — OXYCODONE HYDROCHLORIDE 5 MG: 5 TABLET ORAL at 20:13

## 2020-08-13 RX ADMIN — ROPIVACAINE HYDROCHLORIDE 60 MG: 2 INJECTION, SOLUTION EPIDURAL; INFILTRATION at 07:18

## 2020-08-13 RX ADMIN — Medication 200 MCG: at 08:05

## 2020-08-13 RX ADMIN — FENTANYL CITRATE 50 MCG: 50 INJECTION, SOLUTION INTRAMUSCULAR; INTRAVENOUS at 07:38

## 2020-08-13 RX ADMIN — Medication 200 MCG: at 11:00

## 2020-08-13 RX ADMIN — FENTANYL CITRATE 25 MCG: 50 INJECTION, SOLUTION INTRAMUSCULAR; INTRAVENOUS at 10:25

## 2020-08-13 RX ADMIN — MIDAZOLAM HYDROCHLORIDE 2 MG: 2 INJECTION, SOLUTION INTRAMUSCULAR; INTRAVENOUS at 07:18

## 2020-08-13 RX ADMIN — ROPIVACAINE HYDROCHLORIDE 60 MG: 2 INJECTION, SOLUTION EPIDURAL; INFILTRATION at 07:15

## 2020-08-13 RX ADMIN — SODIUM CHLORIDE, SODIUM LACTATE, POTASSIUM CHLORIDE, AND CALCIUM CHLORIDE 75 ML/HR: 600; 310; 30; 20 INJECTION, SOLUTION INTRAVENOUS at 14:48

## 2020-08-13 RX ADMIN — SODIUM CHLORIDE, SODIUM LACTATE, POTASSIUM CHLORIDE, AND CALCIUM CHLORIDE 25 ML/HR: 600; 310; 30; 20 INJECTION, SOLUTION INTRAVENOUS at 06:44

## 2020-08-13 RX ADMIN — GLYCOPYRROLATE 0.4 MG: 0.2 INJECTION INTRAMUSCULAR; INTRAVENOUS at 10:09

## 2020-08-13 RX ADMIN — MIDAZOLAM 2 MG: 1 INJECTION INTRAMUSCULAR; INTRAVENOUS at 07:13

## 2020-08-13 RX ADMIN — ROCURONIUM BROMIDE 5 MG: 50 INJECTION INTRAVENOUS at 07:38

## 2020-08-13 RX ADMIN — Medication 10 ML: at 15:00

## 2020-08-13 RX ADMIN — DOCUSATE SODIUM 50 MG AND SENNOSIDES 8.6 MG 1 TABLET: 8.6; 5 TABLET, FILM COATED ORAL at 20:13

## 2020-08-13 RX ADMIN — PROPOFOL 150 MG: 10 INJECTION, EMULSION INTRAVENOUS at 07:38

## 2020-08-13 RX ADMIN — ROCURONIUM BROMIDE 10 MG: 50 INJECTION INTRAVENOUS at 08:12

## 2020-08-13 RX ADMIN — ROCURONIUM BROMIDE 5 MG: 50 INJECTION INTRAVENOUS at 09:06

## 2020-08-13 RX ADMIN — SODIUM CHLORIDE, SODIUM LACTATE, POTASSIUM CHLORIDE, AND CALCIUM CHLORIDE: 600; 310; 30; 20 INJECTION, SOLUTION INTRAVENOUS at 09:20

## 2020-08-13 RX ADMIN — Medication 10 ML: at 21:37

## 2020-08-13 RX ADMIN — LIDOCAINE HYDROCHLORIDE 100 MG: 20 INJECTION, SOLUTION EPIDURAL; INFILTRATION; INTRACAUDAL; PERINEURAL at 07:38

## 2020-08-13 RX ADMIN — Medication 20 MG: at 08:03

## 2020-08-13 RX ADMIN — SODIUM CHLORIDE 2 UNITS: 0.9 INJECTION INTRAVENOUS at 08:20

## 2020-08-13 RX ADMIN — PROPOFOL 50 MG: 10 INJECTION, EMULSION INTRAVENOUS at 09:05

## 2020-08-13 RX ADMIN — Medication 100 MCG: at 08:17

## 2020-08-13 RX ADMIN — Medication 10 MG: at 08:17

## 2020-08-13 RX ADMIN — FENTANYL CITRATE 100 MCG: 50 INJECTION, SOLUTION INTRAMUSCULAR; INTRAVENOUS at 07:18

## 2020-08-13 RX ADMIN — ACETAMINOPHEN 650 MG: 325 TABLET ORAL at 20:13

## 2020-08-13 RX ADMIN — ROCURONIUM BROMIDE 25 MG: 50 INJECTION INTRAVENOUS at 07:44

## 2020-08-13 RX ADMIN — FENTANYL CITRATE 100 MCG: 50 INJECTION, SOLUTION INTRAMUSCULAR; INTRAVENOUS at 07:13

## 2020-08-13 RX ADMIN — CEFAZOLIN SODIUM 2 G: 2 SOLUTION INTRAVENOUS at 16:11

## 2020-08-13 RX ADMIN — DEXAMETHASONE SODIUM PHOSPHATE 8 MG: 4 INJECTION, SOLUTION INTRAMUSCULAR; INTRAVENOUS at 07:48

## 2020-08-13 RX ADMIN — MIDAZOLAM HYDROCHLORIDE 2 MG: 2 INJECTION, SOLUTION INTRAMUSCULAR; INTRAVENOUS at 07:30

## 2020-08-13 RX ADMIN — Medication 3 MG: at 10:09

## 2020-08-13 RX ADMIN — FENTANYL CITRATE 25 MCG: 50 INJECTION, SOLUTION INTRAMUSCULAR; INTRAVENOUS at 09:53

## 2020-08-13 RX ADMIN — CEFAZOLIN SODIUM 2 G: 2 SOLUTION INTRAVENOUS at 07:48

## 2020-08-13 RX ADMIN — SUCCINYLCHOLINE CHLORIDE 140 MG: 20 INJECTION, SOLUTION INTRAMUSCULAR; INTRAVENOUS at 07:38

## 2020-08-13 RX ADMIN — Medication 50 MCG: at 07:51

## 2020-08-13 RX ADMIN — ONDANSETRON 4 MG: 2 INJECTION INTRAMUSCULAR; INTRAVENOUS at 10:01

## 2020-08-13 RX ADMIN — PHENYLEPHRINE HYDROCHLORIDE 40 MCG/MIN: 10 INJECTION INTRAVENOUS at 08:37

## 2020-08-13 NOTE — OP NOTES
[facility]  OPERATIVE REPORT     Gurpreet Enriquez  SB#:   [id number]  :  1965  ACCOUNT #: [de-identified]  DATE OF SERVICE:  2020     PREOPERATIVE DIAGNOSIS: Right knee osteoarthritis.     POSTOPERATIVE DIAGNOSIS: Right knee osteoarthritis.     PROCEDURE PERFORMED: Right total knee arthroplasty.     SURGEON:  Trace Jones DO     ASSISTANT:  Jose Martin Allison Student SA.     ANESTHESIA:  General with adductor block and intra-articular Exparel.     ANTIBIOTICS: 2 g IV Ancef.     TOURNIQUET: 106 min @ 659 mmHg.     COMPLICATIONS:  None.     SPECIMENS REMOVED:  None.     IMPLANTS:  Implant Name Type Inv. Item Serial No.  Lot No. LRB No. Used Action   CEMENT BONE SIMPLEX P 1/PACK - [id number]   CEMENT BONE SIMPLEX P 1/PACK   [employer] HOW [id number] Right 1 Implanted   CEMENT BONE SIMPLEX P 1/PACK - [id number]   CEMENT BONE SIMPLEX P 1/PACK   [employer] [facility] [id number] Right 1 Implanted   PAT ASYM TRIATHLN X3 [id number] -- TRIATHLON ASYMMETRIC X3 - [id number]   PAT ASYM TRIATHLN X3 [id number] -- TRIATHLON ASYMMETRIC X3   [employer] HOW [id number] Right 1 Implanted   BASEPLT TIB REV UNIV SZ 6 R/L -- TRIATHLON - [id number]   BASEPLT TIB REV UNIV SZ 6 R/L -- TRIATHLON   [employer] HOW [id number] Right 1 Implanted   COMPNT FEM POST STBL 5 R --  - [id number]   COMPNT FEM POST STBL 5 R --    [employer] HOW [id number] Right 1 Implanted   INSERT TIB POST STBL 6 11MM --  - [id number]   INSERT TIB POST STBL 6 11MM --    [employer] HOW [id number] Right 1 Implanted      Drains: none    ESTIMATED BLOOD LOSS:  100 mL.     INDICATIONS FOR PROCEDURE:  The patient is a 60year-old male who presented to the office with prolonged right knee pain.  X-rays demonstrated osteoarthritis. He exhausted conservative treatment options including activity modification, anti-inflammatories, physical therapy, and injections with no long-lasting relief of his symptoms.  The patient was indicated for a right total knee arthroplasty.  The patient understood all risks, benefits, and alternatives of surgery including infection, bleeding, neurovascular injury, fracture, prosthetic failure, osteolysis, need for revision surgery, failure to return to preinjury level of activity.  The patient voiced understanding and wanted to proceed with the indicated operative procedure.     PROCEDURE:  The patient was identified in the holding area, and the operative site was marked. The patient was given an adductor block by the anesthesia team. Ciera Pennant was confirmed.  The patient was transferred to the operating room and placed on the operating room table in the supine position.  The patient was induced under general anesthesia and given 2 g Ancef IV antibiotics by the anesthesia team.  A tourniquet was placed on the right upper thigh. The right leg and foot were prepped and draped in normal sterile fashion.  At this time, a time-out was called, and the operative site was confirmed, implants needed, instruments, company representative, antibiotics given, and any concerns were addressed.       Esmarch was used to exsanguinate the right leg, and the tourniquet was inflated to 325 mmHg.  A 22-cm midline incision was made with a #10 blade through the skin down to the fascia.  Medial and lateral skin flaps were made with the #10 blade.  Using a new #10 blade, a medial parapatellar arthrotomy was made.  The infrapatellar fat pad and suprapatellar fat pad were resected.  The patella was everted, and the knee was flexed.  ACL and PCL were removed.  The knee was then extended; and using a Bovie, the deep MCL was released to the mid coronal plane.  The knee was then flexed up again, and the reamer for the intramedullary femoral guide was used to make a hole for the intramedullary guide in line with the PCL.  The intramedullary distal femoral guide was inserted in the distal femur.  Distal femur cut was made.  The posterior referencing femoral sizing guide was then placed, and a size five femoral component was measured.  The 4-in-1 cutting block size 5 was placed, and the anterior, posterior and chamfer cuts on the femur were made.  The extramedullary tibial guide was then placed.  The proximal tibia cut was made using the 9-mm cut along the lateral tibial plateau.  The extramedullary guide was removed, and a 9-mm spacer block was well balanced medially and laterally. The femoral box cut was then made with the guide and reciprocating saw.  A lamina  was used in the medial and the lateral compartments of the knee, and the remaining medial and lateral meniscus were removed.  Exparel was injected into the posterior capsule. Aquamantus was used along the posterior capsule.  The PCL retractor was placed along with Hohmann's along the medial and lateral aspect of the tibia.  The size 6 tibial component was measured, and the tibial reamer and punch were then used for the tibia.   At this time, attention was turned to the patellar cut.  Patella measured 26 mm, and a patellar cutting guide was placed to leave behind 16 mm.  The patella was cut with a sagittal saw.  A size 35 patella was measured, and the three peg holes were reamed.  The femoral, tibial and patellar trials were placed with a size 9 mm spacer.  The knee demonstrated good ROM and stability with a well tracking patella component.     At this time, the knee was thoroughly irrigated with the pulse .  The cement was mixed.  PCL retractor was placed as well as medial and lateral Hohmann's.  The tibial prosthesis was then cemented in place.  PCL retractor was removed, and the femoral component was cemented in place.  A size 9 mm trial spacer was implanted, and the knee was extended, and the patella was cemented in place.  All extruded cement was removed from the knee.  The rest of the Exparel was injected into the capsule and subcutaneous tissue.  After the cement had hardened, the knee was ranged through full motion and stability was tested.  A size 11 mm trial demonstrated good balance and a size 11 mm was chosen as the final implant.  The size 11 mm poly was secured to the tibial baseplate.  Knee was then stable in anterior and posterior stress, as well as varus and valgus.  Full extension of the knee was obtained as well as flexion to approximately 140 degrees.     At this time, the tourniquet was let down for a total tourniquet time of 106 min.  All bleeders were coagulated with the Bovie and aquamantus.  Betadine solution was placed in the knee for approximately 2 minutes.  The knee was then irrigated with the pulse .  Three grams of trans exemic acid were instilled in the knee joint. The arthrotomy was closed with a Stratafix self-locking suture. The knee was irrigated again, and the subcutaneous skin was closed with 0 and 2-0 Vicryl followed by a running buried 3-0 Monocryl for the skin.  Prineo incision dressing placed over the incision followed by a OpSite dressing.  ABDs and Ace wrap were then placed on the knee.  The patient was awoken from anesthesia by the anesthesia team.  The patient was transported from the operating room table to his hospital bed.  The patient was then transported to PACU in stable condition.     PLAN OF CARE:  The patient is going to be admitted for observation overnight with inpatient physical therapy.  The patient will plan to be discharged home tomorrow with his family and home health. [facility] will be placed on  mg BID for DVT prophylaxis.  The patient will follow up in the office two weeks after surgery for routine postoperative evaluation and wound check.

## 2020-08-13 NOTE — BRIEF OP NOTE
Brief Postoperative Note    Patient: Gage Middleton  YOB: 1965  MRN: [id number]    Date of Procedure: 8/13/2020     Pre-Op Diagnosis: Osteoarthritis of right knee    Post-Op Diagnosis: Same as preoperative diagnosis. Procedure(s): RIGHT TOTAL KNEE REPLACEMENT    Surgeon(s):  Felisha Briscoe DO    Surgical Assistant: Cesar Martin SA, Lino Umanzor Student SA. Anesthesia: General with Block and Intra-articular Exparel. Estimated Blood Loss (mL): 100 cc    Tourniquet: 106 min @ 078 mmHg    Complications: None    Specimens: none. Implants:   Implant Name Type Inv.  Item Serial No.  Lot No. LRB No. Used Action   CEMENT BONE SIMPLEX P 1/PACK - [id number]  CEMENT BONE SIMPLEX P 1/PACK  [employer] HOW [id number] Right 1 Implanted   CEMENT BONE SIMPLEX P 1/PACK - [id number]  CEMENT BONE SIMPLEX P 1/PACK  [employer] HOW [id number] Right 1 Implanted   PAT ASYM TRIATHLN X3 [id number] -- TRIATHLON ASYMMETRIC X3 - [id number]  PAT ASYM TRIATHLN X3 [id number] -- TRIATHLON ASYMMETRIC X3  [employer] HOW [id number] Right 1 Implanted   BASEPLT TIB REV UNIV SZ 6 R/L -- TRIATHLON - [id number]  BASEPLT TIB REV UNIV SZ 6 R/L -- TRIATHLON  [employer] HOW [id number] Right 1 Implanted   COMPNT FEM POST STBL 5 R --  - [id number]  COMPNT FEM POST STBL 5 R --   [employer] HOW [id number] Right 1 Implanted   INSERT TIB POST STBL 6 11MM --  - [id number]  INSERT TIB POST STBL 6 11MM --   [employer] HOW [id number] Right 1 Implanted     Drains: none    Findings: right knee osteoarthritis    Electronically Signed by Varsha Hong DO on 8/13/2020 at 10:25 AM

## 2020-08-13 NOTE — ANESTHESIA PREPROCEDURE EVALUATION
Relevant Problems   No relevant active problems       Anesthetic History               Review of Systems / Medical History  Patient summary reviewed and pertinent labs reviewed    Pulmonary        Sleep apnea: CPAP           Neuro/Psych         Psychiatric history     Cardiovascular    Hypertension: well controlled          CAD    Exercise tolerance: >4 METS     GI/Hepatic/Renal     GERD: well controlled           Endo/Other        Morbid obesity and arthritis     Other Findings              Physical Exam    Airway  Mallampati: III  TM Distance: 4 - 6 cm  Neck ROM: decreased range of motion   Mouth opening: Diminished (comment)     Cardiovascular    Rhythm: regular  Rate: normal         Dental  No notable dental hx       Pulmonary  Breath sounds clear to auscultation               Abdominal  GI exam deferred       Other Findings            Anesthetic Plan    ASA: 3  Anesthesia type: general and regional - femoral single shot          Induction: Intravenous  Anesthetic plan and risks discussed with: Patient

## 2020-08-13 NOTE — ANESTHESIA POSTPROCEDURE EVALUATION
Procedure(s):  RIGHT TOTAL KNEE REPLACEMENT/NEEL/2 SA'S/ADDUCTOR BLOCK.    general, regional    Anesthesia Post Evaluation      Multimodal analgesia: multimodal analgesia used between 6 hours prior to anesthesia start to PACU discharge  Patient location during evaluation: bedside  Patient participation: complete - patient participated  Level of consciousness: awake  Pain score: 0  Pain management: adequate  Airway patency: patent  Anesthetic complications: no  Cardiovascular status: stable  Respiratory status: acceptable  Hydration status: acceptable  Post anesthesia nausea and vomiting:  controlled  Final Post Anesthesia Temperature Assessment:  Normothermia (36.0-37.5 degrees C)      INITIAL Post-op Vital signs:   Vitals Value Taken Time   /51 8/13/2020 12:05 PM   Temp 36.9 °C (98.5 °F) 8/13/2020 10:56 AM   Pulse 107 8/13/2020 12:07 PM   Resp 21 8/13/2020 12:07 PM   SpO2 96 % 8/13/2020 12:07 PM   Vitals shown include unvalidated device data.

## 2020-08-13 NOTE — ANESTHESIA PROCEDURE NOTES
Peripheral Block    Start time: 8/13/2020 7:12 AM  End time: 8/13/2020 7:21 AM  Performed by: Mo Short MD  Authorized by: Mo Short MD       Pre-procedure: Indications: at surgeon's request and post-op pain management    Preanesthetic Checklist: patient identified, risks and benefits discussed, site marked, timeout performed, anesthesia consent given and patient being monitored    Timeout Time: 07:12          Block Type:   Block Type:   Adductor canal  Laterality:  Right  Monitoring:  Standard ASA monitoring, responsive to questions, oxygen, continuous pulse ox, frequent vital sign checks and heart rate  Injection Technique:  Single shot  Procedures: ultrasound guided    Patient Position: supine  Prep: chlorhexidine    Needle Type:  Ultraplex  Needle Gauge:  21 G  Needle Localization:  Ultrasound guidance    Assessment:  Number of attempts:  1  Injection Assessment:  Ultrasound image on chart, no paresthesia, incremental injection every 5 mL, local visualized surrounding nerve on ultrasound, negative aspiration for blood, no intravascular symptoms and low pressure verified by pressure monitor  Patient tolerance:  Patient tolerated the procedure well with no immediate complications

## 2020-08-13 NOTE — PERIOP NOTES
Dr. Steven Joseph at patient's bedside to assess patient's heart rate and blood pressure. MD advised that patient did not need any further treatment at this time.

## 2020-08-13 NOTE — PROGRESS NOTES
Problem: Pain  Goal: *Control of Pain  Outcome: Progressing Towards Goal  Goal: *PALLIATIVE CARE:  Alleviation of Pain  Outcome: Progressing Towards Goal     Problem: Patient Education: Go to Patient Education Activity  Goal: Patient/Family Education  Outcome: Progressing Towards Goal     Problem: Falls - Risk of  Goal: *Absence of Falls  Description: Document Arianna Mas Fall Risk and appropriate interventions in the flowsheet.   Outcome: Progressing Towards Goal  Note: Fall Risk Interventions:                                Problem: Patient Education: Go to Patient Education Activity  Goal: Patient/Family Education  Outcome: Progressing Towards Goal     Problem: Patient Education: Go to Patient Education Activity  Goal: Patient/Family Education  Outcome: Progressing Towards Goal

## 2020-08-13 NOTE — PERIOP NOTES
TRANSFER - OUT REPORT:    Verbal report given to Anna Reeder RN(name) on Helen Bhandari  being transferred to 2 surgical(unit) for routine post - op       Report consisted of patients Situation, Background, Assessment and   Recommendations(SBAR). Information from the following report(s) SBAR, Kardex, OR Summary, Procedure Summary, Intake/Output, MAR, Recent Results and Med Rec Status was reviewed with the receiving nurse. Lines:   Peripheral IV 08/13/20 Left Wrist (Active)   Site Assessment Clean, dry, & intact 08/13/20 1056   Phlebitis Assessment 0 08/13/20 1056   Infiltration Assessment 0 08/13/20 1056   Dressing Status Clean, dry, & intact 08/13/20 1056   Dressing Type Tape;Transparent 08/13/20 1056   Hub Color/Line Status Pink; Infusing 08/13/20 1056        Opportunity for questions and clarification was provided.       Patient transported with:   O2 @ 3 liters  Registered Nurse

## 2020-08-13 NOTE — PROGRESS NOTES
Report received from [facility], RN regarding patients current medical situations for continuations of care.

## 2020-08-13 NOTE — PROGRESS NOTES
Problem: Mobility Impaired (Adult and Pediatric)  Goal: *Acute Goals and Plan of Care (Insert Text)  Description: Physical Therapy Goals  Initiated 8/13/2020 and to be accomplished within 7 day(s) on 8/20/2020  1. Patient will move from supine to sit and sit to supine , scoot up and down, and roll side to side in bed with modified independence. 2.  Patient will transfer from bed to chair and chair to bed with supervision/set-up using the least restrictive device. 3.  Patient will perform sit to stand with modified independence. 4.  Patient will ambulate with supervision/set-up for 150 feet with the least restrictive device. 5.  Patient will ascend/descend 4 stairs with right handrail(s) with contact guard assist.     Prior Level of Function:   Patient was independence for all mobility including gait using no assistive device. Patient lives with family in a 2 story home with 0 steps to enter with bedroom on second level. Patient reports having WC, RW, and shower chair at home. Outcome: Progressing Towards Goal     PHYSICAL THERAPY EVALUATION    Patient: Nilesh Goddard (89 y.o. male)  Date: 8/13/2020  Primary Diagnosis: Osteoarthritis of right knee, unspecified osteoarthritis type [M17.11]  Total knee replacement status, right [Z96.651]  Procedure(s) (LRB):  RIGHT TOTAL KNEE REPLACEMENT/NEEL/2 SA'S/ADDUCTOR BLOCK (Right) Day of Surgery   Precautions: Fall, WBAT(right TKA )      ASSESSMENT :  Based on the objective data described below, the patient presents status post day 0 following right TKA by Dr. Key Calvo. Patient was cleared by nursing for PT evaluation. Upon entry to room, patient on FaceTime with wife. Patient is agreeable to therapy evaluation and all questions from wife and family were answered. He presents with decreased right LE range of motion and strength impacting functional mobility. He was stand-by assist with bed mobility including supine<>sit.  Patient denied dizziness with sitting edge of bed and demonstrated good sitting balance. He was educated on proper sequencing for sit<>stand with use of RW and appropriate hand placement. Patient was contact guard assist for sit<>stand. He was encouraged to weight shift prior to attempting ambulation in the room. Patient requested to use bathroom and was able to ambulate using RW and contact guard assist for safety. He was contact guard assist for toilet transfer using grab bar. Patient ambulated ~25 feet total in room with RW and contact guard assist demonstrating increased stance on left LE, antalgic gait, and decreased foot clearance on right with a slow gait speed. VC provided to maintain within base of support as patient would attempt larger steps with feet in front of RW. Patient was repositioned in recliner with legs elevated and educated on ankle pumps, heel slides, and quad sets. He was given blue education handbook for reference. He was positioned with SCDs in place, ice pack on right knee, call button/phone within reach, and nursing notified of patient's progress. Patient will benefit from skilled intervention to address the above impairments.   Patient's rehabilitation potential is considered to be Excellent   Factors which may influence rehabilitation potential include:   []         None noted  []         Mental ability/status  [x]         Medical condition  []         Home/family situation and support systems  []         Safety awareness  []         Pain tolerance/management  []         Other:      PLAN :  Recommendations and Planned Interventions:   [x]           Bed Mobility Training             [x]    Neuromuscular Re-Education  [x]           Transfer Training                   []    Orthotic/Prosthetic Training  [x]           Gait Training                          []    Modalities  [x]           Therapeutic Exercises           []    Edema Management/Control  [x]           Therapeutic Activities            []    Family Training/Education  [x]           Patient Education  []           Other (comment):    Frequency/Duration: Patient will be followed by physical therapy 1-2 times per day/4-7 days per week to address goals. Discharge Recommendations: Home Health  Further Equipment Recommendations for Discharge: has RW      SUBJECTIVE:   Patient stated I am nervous about getting upstairs to my bedroom.     OBJECTIVE DATA SUMMARY:     Past Medical History:   Diagnosis Date    Abnormal tilt table test 2013    Back pain     CAD (coronary artery disease)     Coronary artery spasm (HCC)     GERD (gastroesophageal reflux disease)     Hypertension     Kidney stone     Migraine     migraines    Prediabetes     Sleep apnea     uses cpap    Syncope     has had recurrent issues, has had work-up to include EEG, monitor, tilt table, Carotids, ECHO     Past Surgical History:   Procedure Laterality Date    CARDIAC SURG PROCEDURE UNLIST      cardiac catheterization times 5    COLONOSCOPY N/A 9/30/2016    COLONOSCOPY with biopsy performed by Pablo Najjar, MD at [facility] ENDOSCOPY    HX BACK SURGERY      x3    HX ENDOSCOPY      reportedly normal by patient    HX HEENT      sinus sx    HX ORTHOPAEDIC      left shoulder, right knee, left thumb, left great toe sx    HX UROLOGICAL      Lithotripsy     Barriers to Learning/Limitations: None  Compensate with: Visual Cues and Verbal Cues  Home Situation:  Home Situation  Home Environment: Private residence  # Steps to Enter: 0  One/Two Story Residence: Two story  # of Interior Steps: 12  Interior Rails: Right  Living Alone: No  Support Systems: Family member(s)  Patient Expects to be Discharged to[de-identified] Private residence  Current DME Used/Available at Home: Shower chair, Walker, rolling, Wheelchair  Critical Behavior:  Neurologic State: Alert  Orientation Level: Oriented X4  Cognition: Follows commands; Appropriate safety awareness; Appropriate decision making; Appropriate for age attention/concentration  Psychosocial  Patient Behaviors: Calm; Cooperative    Strength:    Strength: Generally decreased, functional(right LE )    Tone & Sensation:   Tone: Normal  Sensation: Intact    Range Of Motion:  AROM: Generally decreased, functional(right LE )  PROM: Generally decreased, functional(right LE )    Posture:  Posture (WDL): Exceptions to WDL  Posture Assessment: Rounded shoulders; Forward head  Functional Mobility:  Bed Mobility:  Supine to Sit: Stand-by assistance  Scooting: Stand-by assistance    Transfers:  Sit to Stand: Contact guard assistance  Stand to Sit: Contact guard assistance    Balance:   Sitting: Intact  Standing: Impaired; With support  Standing - Static: Good  Standing - Dynamic : Fair(Fair+)    Ambulation/Gait Training:  Distance (ft): 25 Feet (ft)  Assistive Device: Walker, rolling  Ambulation - Level of Assistance: Contact guard assistance  Gait Description (WDL): Exceptions to WDL  Gait Abnormalities: Antalgic;Decreased step clearance  Base of Support: Shift to left  Stance: Right decreased; Left increased  Speed/Lisseth: Slow  Step Length: Left shortened;Right lengthened    Therapeutic Exercises: Ankle pumps, quad sets, and heel slides for LE range of motion activities and DVT prevention. Pain:  Pain level pre-treatment: 2/10 right knee  Pain level post-treatment: 2/10 right knee  Pain Intervention(s) : Medication (see MAR); Rest, Ice, Repositioning  Response to intervention: Nurse notified, See doc flow    Activity Tolerance:   Good   Please refer to the flowsheet for vital signs taken during this treatment.   After treatment:   [x]         Patient left in no apparent distress sitting up in chair  []         Patient left in no apparent distress in bed  [x]         Call bell left within reach  [x]         Nursing notified  []         Caregiver present  []         Bed alarm activated  [x]         SCDs applied    COMMUNICATION/EDUCATION:   [x]         Role of Physical Therapy in the acute care setting. [x]         Fall prevention education was provided and the patient/caregiver indicated understanding. [x]         Patient/family have participated as able in goal setting and plan of care. [x]         Patient/family agree to work toward stated goals and plan of care. []         Patient understands intent and goals of therapy, but is neutral about his/her participation. []         Patient is unable to participate in goal setting/plan of care: ongoing with therapy staff.  []         Other:     Thank you for this referral.  Danyel Virgen, PT, DPT    Time Calculation: 30 mins      Eval Complexity: History: MEDIUM  Complexity : 1-2 comorbidities / personal factors will impact the outcome/ POC Exam:LOW Complexity : 1-2 Standardized tests and measures addressing body structure, function, activity limitation and / or participation in recreation  Presentation: LOW Complexity : Stable, uncomplicated  Clinical Decision Making:Low Complexity    Overall Complexity:LOW

## 2020-08-13 NOTE — ROUTINE PROCESS
Bedside and Verbal shift change report given to Skinny Majano RN (oncoming nurse) by Nevaeh Hernandes (offgoing nurse). Report included the following information SBAR, Kardex, Intake/Output and MAR.

## 2020-08-13 NOTE — INTERVAL H&P NOTE
Update History & Physical 
 
The Patient's History and Physical of August 10, 2020 was reviewed with the patient and I examined the patient. There was no change. The surgical site was confirmed by the patient and me. Plan:  The risk, benefits, expected outcome, and alternative to the recommended procedure have been discussed with the patient. Patient understands and wants to proceed with the procedure.  
 
Electronically signed by Debra Armstrong DO on 8/13/2020 at 7:11 AM

## 2020-08-13 NOTE — PROGRESS NOTES
Patients arrives via bed alert awke and oriented , Rnee dressings is dry and intact with ice pack applied. CMS+ no s/s of distress or sob.

## 2020-08-14 ENCOUNTER — TELEPHONE (OUTPATIENT)
Dept: FAMILY MEDICINE CLINIC | Age: 55
End: 2020-08-14

## 2020-08-14 ENCOUNTER — HOME HEALTH ADMISSION (OUTPATIENT)
Dept: HOME HEALTH SERVICES | Facility: HOME HEALTH | Age: 55
End: 2020-08-14
Payer: COMMERCIAL

## 2020-08-14 VITALS
TEMPERATURE: 98.4 F | SYSTOLIC BLOOD PRESSURE: 115 MMHG | BODY MASS INDEX: 36.29 KG/M2 | HEIGHT: 69 IN | HEART RATE: 109 BPM | DIASTOLIC BLOOD PRESSURE: 78 MMHG | WEIGHT: 245 LBS | RESPIRATION RATE: 18 BRPM | OXYGEN SATURATION: 95 %

## 2020-08-14 LAB
ANION GAP SERPL CALC-SCNC: 5 MMOL/L (ref 3–18)
BUN SERPL-MCNC: 13 MG/DL (ref 7–18)
BUN/CREAT SERPL: 14 (ref 12–20)
CALCIUM SERPL-MCNC: 8.7 MG/DL (ref 8.5–10.1)
CHLORIDE SERPL-SCNC: 103 MMOL/L (ref 100–111)
CO2 SERPL-SCNC: 28 MMOL/L (ref 21–32)
CREAT SERPL-MCNC: 0.96 MG/DL (ref 0.6–1.3)
GLUCOSE SERPL-MCNC: 94 MG/DL (ref 74–99)
HCT VFR BLD AUTO: 35.9 % (ref 36–48)
HGB BLD-MCNC: 12 G/DL (ref 13–16)
POTASSIUM SERPL-SCNC: 4.1 MMOL/L (ref 3.5–5.5)
SODIUM SERPL-SCNC: 136 MMOL/L (ref 136–145)

## 2020-08-14 PROCEDURE — 85018 HEMOGLOBIN: CPT

## 2020-08-14 PROCEDURE — 97116 GAIT TRAINING THERAPY: CPT

## 2020-08-14 PROCEDURE — 74011250637 HC RX REV CODE- 250/637: Performed by: ORTHOPAEDIC SURGERY

## 2020-08-14 PROCEDURE — 97535 SELF CARE MNGMENT TRAINING: CPT

## 2020-08-14 PROCEDURE — 80048 BASIC METABOLIC PNL TOTAL CA: CPT

## 2020-08-14 PROCEDURE — 97165 OT EVAL LOW COMPLEX 30 MIN: CPT

## 2020-08-14 PROCEDURE — 36415 COLL VENOUS BLD VENIPUNCTURE: CPT

## 2020-08-14 PROCEDURE — 74011250636 HC RX REV CODE- 250/636: Performed by: ORTHOPAEDIC SURGERY

## 2020-08-14 RX ORDER — ASPIRIN 325 MG
325 TABLET, DELAYED RELEASE (ENTERIC COATED) ORAL 2 TIMES DAILY
Qty: 60 TAB | Refills: 0 | Status: SHIPPED
Start: 2020-08-14 | End: 2021-05-18

## 2020-08-14 RX ADMIN — OXYCODONE HYDROCHLORIDE 10 MG: 5 TABLET ORAL at 08:16

## 2020-08-14 RX ADMIN — OXYCODONE HYDROCHLORIDE 5 MG: 5 TABLET ORAL at 00:25

## 2020-08-14 RX ADMIN — OXYCODONE HYDROCHLORIDE 10 MG: 5 TABLET ORAL at 04:10

## 2020-08-14 RX ADMIN — ACETAMINOPHEN 650 MG: 325 TABLET ORAL at 00:25

## 2020-08-14 RX ADMIN — ISOSORBIDE MONONITRATE 30 MG: 30 TABLET, EXTENDED RELEASE ORAL at 08:14

## 2020-08-14 RX ADMIN — ACETAMINOPHEN 650 MG: 325 TABLET ORAL at 12:00

## 2020-08-14 RX ADMIN — Medication 10 ML: at 06:05

## 2020-08-14 RX ADMIN — TAMSULOSIN HYDROCHLORIDE 0.4 MG: 0.4 CAPSULE ORAL at 08:16

## 2020-08-14 RX ADMIN — DOCUSATE SODIUM 50 MG AND SENNOSIDES 8.6 MG 1 TABLET: 8.6; 5 TABLET, FILM COATED ORAL at 08:15

## 2020-08-14 RX ADMIN — SODIUM CHLORIDE, SODIUM LACTATE, POTASSIUM CHLORIDE, AND CALCIUM CHLORIDE 75 ML/HR: 600; 310; 30; 20 INJECTION, SOLUTION INTRAVENOUS at 04:12

## 2020-08-14 RX ADMIN — ASPIRIN 325 MG: 325 TABLET, DELAYED RELEASE ORAL at 08:15

## 2020-08-14 RX ADMIN — CEFAZOLIN SODIUM 2 G: 2 SOLUTION INTRAVENOUS at 00:25

## 2020-08-14 RX ADMIN — AMLODIPINE BESYLATE 5 MG: 5 TABLET ORAL at 08:15

## 2020-08-14 RX ADMIN — ACETAMINOPHEN 650 MG: 325 TABLET ORAL at 06:05

## 2020-08-14 RX ADMIN — SERTRALINE HYDROCHLORIDE 100 MG: 50 TABLET ORAL at 08:15

## 2020-08-14 NOTE — DISCHARGE INSTRUCTIONS
Patient Education   MyChart Activation  MyChart Activation    Thank you for requesting access to [employer]. Please follow the instructions below to securely access and download your online medical record. [employer] allows you to send messages to your doctor, view your test results, renew your prescriptions, schedule appointments, and more. How Do I Sign Up? 1. In your internet browser, go to [email or web address]  2. Click on the First Time User? Click Here link in the Sign In box. You will be redirect to the New Member Sign Up page. 3. Enter your [employer] Access Code exactly as it appears below. You will not need to use this code after youve completed the sign-up process. If you do not sign up before the expiration date, you must request a new code. [employer] Access Code: Activation code not generated  Current [employer] Status: Active (This is the date your [employer] access code will )    4. Enter the last four digits of your Social Security Number (xxxx) and Date of Birth (mm/dd/yyyy) as indicated and click Submit. You will be taken to the next sign-up page. 5. Create a [employer] ID. This will be your [employer] login ID and cannot be changed, so think of one that is secure and easy to remember. 6. Create a [employer] password. You can change your password at any time. 7. Enter your Password Reset Question and Answer. This can be used at a later time if you forget your password. 8. Enter your e-mail address. You will receive e-mail notification when new information is available in [address]. 9. Click Sign Up. You can now view and download portions of your medical record. 10. Click the Download Summary menu link to download a portable copy of your medical information. Additional Information    If you have questions, please visit the Frequently Asked Questions section of the [employer] website at [email or web address] Remember, [employer] is NOT to be used for urgent needs.  For medical emergencies, dial     DISCHARGE SUMMARY from Nurse    PATIENT INSTRUCTIONS:    After general anesthesia or intravenous sedation, for 24 hours or while taking prescription Narcotics:  · Limit your activities  · Do not drive and operate hazardous machinery  · Do not make important personal or business decisions  · Do  not drink alcoholic beverages  · If you have not urinated within 8 hours after discharge, please contact your surgeon on call. Report the following to your surgeon:  · Excessive pain, swelling, redness or odor of or around the surgical area  · Temperature over 100.5  · Nausea and vomiting lasting longer than 4 hours or if unable to take medications  · Any signs of decreased circulation or nerve impairment to extremity: change in color, persistent  numbness, tingling, coldness or increase pain  · Any questions    What to do at Home:    Please follow up with Dr. Ronny Rodgers as specify. *  Please give a list of your current medications to your Primary Care Provider. *  Please update this list whenever your medications are discontinued, doses are      changed, or new medications (including over-the-counter products) are added. *  Please carry medication information at all times in case of emergency situations. These are general instructions for a healthy lifestyle:    No smoking/ No tobacco products/ Avoid exposure to second hand smoke  Surgeon General's Warning:  Quitting smoking now greatly reduces serious risk to your health. Obesity, smoking, and sedentary lifestyle greatly increases your risk for illness    A healthy diet, regular physical exercise & weight monitoring are important for maintaining a healthy lifestyle    You may be retaining fluid if you have a history of heart failure or if you experience any of the following symptoms:  Weight gain of 3 pounds or more overnight or 5 pounds in a week, increased swelling in our hands or feet or shortness of breath while lying flat in bed. Please call your doctor as soon as you notice any of these symptoms; do not wait until your next office visit. The discharge information has been reviewed with the patient. The patient verbalized understanding. Discharge medications reviewed with the patient and appropriate educational materials and side effects teaching were provided. ___________________________________________________________________________________________________________________________________  Thank you for requesting access to [employer]. Please follow the instructions below to securely access and download your online medical record. [employer] allows you to send messages to your doctor, view your test results, renew your prescriptions, schedule appointments, and more. How Do I Sign Up?    11. In your internet browser, go to [email or web address]  12. Click on the First Time User? Click Here link in the Sign In box. You will be redirect to the New Member Sign Up page. 15. Enter your [employer] Access Code exactly as it appears below. You will not need to use this code after youve completed the sign-up process. If you do not sign up before the expiration date, you must request a new code. [employer] Access Code: Activation code not generated  Current [employer] Status: Active (This is the date your [employer] access code will )    14. Enter the last four digits of your Social Security Number (xxxx) and Date of Birth (mm/dd/yyyy) as indicated and click Submit. You will be taken to the next sign-up page. 15. Create a [employer] ID. This will be your [employer] login ID and cannot be changed, so think of one that is secure and easy to remember. 12. Create a [employer] password. You can change your password at any time. 16. Enter your Password Reset Question and Answer. This can be used at a later time if you forget your password. 25. Enter your e-mail address.  You will receive e-mail notification when new information is available in [employer]. 19. Click Sign Up. You can now view and download portions of your medical record. 20. Click the Download Summary menu link to download a portable copy of your medical information. Additional Information    If you have questions, please visit the Frequently Asked Questions section of the [employer] website at [email or web address] Remember, [employer] is NOT to be used for urgent needs. For medical emergencies, dial 911. Patient armband removed and shredded  . Learning About Total Knee Replacement Surgery  What is a total knee replacement? A total knee replacement replaces the worn ends of the thighbone (femur) and the lower leg bone (tibia) where they meet at the knee. Sometimes the surface of the patella (kneecap) is replaced too. You may want this surgery if you have knee pain, stiffness, swelling, or problems moving your knee that you cannot treat in other ways. For most people, these problems are caused by arthritis. They can also be caused by a knee injury. If you need to have both knees replaced, you may have both surgeries at the same time. Or your doctor may recommend doing one knee at a time. Your doctor would replace the second knee after you recover from the first knee surgery. Recovery after a double knee replacement takes longer than after a single replacement. How is a total knee replacement done? Before surgery, you will get medicine to make you sleep or feel relaxed. If you will be awake during surgery, you will also get a shot of medicine into your spine to make your legs numb. There are two types of replacement joints. They are:  · Cemented joints. The cement acts as glue, attaching the new joint to the bone. · Uncemented joints. These have a metal coating with many small openings. Over time, new bone grows and fills up the openings. This new bone attaches the joint to the bone.   Your doctor may also use a combination of cemented and uncemented parts.  Your doctor makes a 4- to 10-inch cut, called an incision, on the front of your knee. Your doctor then:  · Replaces the damaged part of your femur with a metal piece. · Replaces the damaged part of your tibia with a metal piece and plastic surface. · May replace part of your kneecap with plastic. The doctor finishes the surgery by closing your incision with stitches, staples, tissue glue, or tape strips. What can you expect as you recover from total knee replacement surgery? Your knee will be swollen and will hurt when you move it. You'll need to take pain medicine for a time after surgery. You will start to walk with a walker or crutches the day after surgery. You'll start rehabilitation (rehab) before you leave the hospital. Rehab will help you improve strength and movement in your knee. You'll need someone to help you at home for the first few weeks. If you need more extensive rehab, you may go to a specialized rehab center for more treatment. After you recover, you should be able to do activities such as go for walks, dance, ride a bike on flat ground, and play golf. Talk to your doctor about whether you can do more strenuous activities. Follow-up care is a key part of your treatment and safety. Be sure to make and go to all appointments, and call your doctor if you are having problems. It's also a good idea to know your test results and keep a list of the medicines you take. Where can you learn more? Go to http://sandra-ramana.info/  Enter A781 in the search box to learn more about \"Learning About Total Knee Replacement Surgery. \"  Current as of: March 2, 2020               Content Version: 12.5  © 4331-7552 Healthwise, Incorporated. Care instructions adapted under license by [employer] (which disclaims liability or warranty for this information).  If you have questions about a medical condition or this instruction, always ask your healthcare professional. [employer] disclaims any warranty or liability for your use of this information. Give patient Dr. Wendi Calabrese post op total knee replacement instructions.

## 2020-08-14 NOTE — PROGRESS NOTES
Pt seen for PT treatment at 1001. Pt safe with all mobility including ambulation of 200 ft and negotiation of 8 stairs. From a PT perspective, pt is safe for discharge and would benefit from home health PT upon discharge, when appropriate. Full note to follow. Sia Oscar

## 2020-08-14 NOTE — ROUTINE PROCESS
8/14/2020 
6:33 AM 
 
End of Shift Note Bedside and verbal shift change report given to Christina Frank RN (On coming nurse) by JORGE Lauren (Off going nurse). --- Written Report left for Jung Ortiz RN. Report included the following information:  
   --Procedure Summary 
   --MAR, 
   --Recent Results --Med Rec Status SBAR Recommendations:  
Issues for Provider to address Activity This Shift 
 
 [] Bed Rest Order 
 [] Refused 
 [] Dangled  
 [] TDWB Ambulating: 
   [x] Bathroom [] BSC [x] Room/Hallway Up in Chair for meals []Yes [] No  
Voiding       [x] Yes  [] No 
Barreto          [] Yes  [] No 
Incontinent [] Yes  [] No 
 
DUE TO VOID POUR        [] Yes [] No 
Purewick    [] Yes [] No 
New Onset [] Yes [] No Straight Cath   []Yes  [] No 
Condom Cath  [] Yes [] No 
MD Called      [] Yes  [] No  
Blood Sugars Managed []Yes [x] No   
Bowels Moved [] Yes [x] No 
 
Incontinent     [] Yes [] No Passed Gas [x]Yes [] No 
 
New Onset  []Yes [] No 
  
 
 MD Called []Yes  [] No 
  
CHG Bath Done Before Surgery After Surgery  
  
[] Yes  [x] No 
[] Yes  [x] No   
  
Drain Removed [] Yes  [] No [x] N/A Dressing Changed [] Yes   [] No [x] N/A Nausea/Vomiting [] Yes   [] No    
Ice Packs Changed [x] Yes   [] No  [] N/A Incentive Spirometer  [x] Yes  [] No     
SCD Pumps On Ankle Pumping  [x] Yes   [] No  
  
[x] Yes   [] No    
  
Telemetry Monitoring [] Yes   [x] No   Rhythm

## 2020-08-14 NOTE — DISCHARGE SUMMARY
Patient admitted for surgery on 8/13/20 for right knee osteoarthritis. He underwent a right total knee arthroplasty without complications. He was admitted for observation for IV Abx, PT/OT and pain control. He had an uneventful stay and will be discharged home with home health PT and nursing care. Patient is stable on discharge and will follow up in the office in 2 weeks.

## 2020-08-14 NOTE — TELEPHONE ENCOUNTER
[facility] called requesting a hospital follow up. Pt is being discharged home  today(8/14/2020) , Pt had right knee surgery. Please call pt with a follow up appt.

## 2020-08-14 NOTE — TELEPHONE ENCOUNTER
Gregg Spur [phone] that Dr. Sourav Solis can see him for a virtual visit on 8/20/2020 @ 3:45 pm. He was asked to contact office to confirm if he can make the virtual appointment.

## 2020-08-14 NOTE — PROGRESS NOTES
Patient gave [facility] phone # [phone], and fax # [phone] for [employer]. Patient said to fax home health orders, CM faxed. Patient said to call to see if Lizy Rawls takes [facility]. Patient was instructed by Lizy Rawls for Home Health orders. CM called and had a lengthy conversation with Apple Smith who said [employer] was in network, and that it will be processed, they will reach out to [facility] and make sure that they can accept the patient. Reference # for the call## [id number].     Rachael Rodriguez RN  Case Management [phone]

## 2020-08-14 NOTE — PROGRESS NOTES
Problem: Self Care Deficits Care Plan (Adult)  Goal: *Acute Goals and Plan of Care (Insert Text)  Outcome: Resolved/Met   OCCUPATIONAL THERAPY EVALUATION/DISCHARGE    Patient: Karin Cooper (84 y.o. male)  Date: 8/14/2020  Primary Diagnosis: Osteoarthritis of right knee, unspecified osteoarthritis type [M17.11]  Total knee replacement status, right [Z96.651]  Procedure(s) (LRB):  RIGHT TOTAL KNEE REPLACEMENT/NEEL/2 SA'S/ADDUCTOR BLOCK (Right) 1 Day Post-Op   Precautions:   Fall, WBAT(right TKA )  PLOF: Pt reports he lives with his wife. Pt was (I) with basic self-care/ADLs PTA. ASSESSMENT AND RECOMMENDATIONS:  Upon entering room, pt seated in chair, alert, and agreeable to therapy session. Based on the objective data described below, the patient presents he is motivated to return home as he is Mod independent in most basic self-care tasks requiring SBA/Supervision for LB dressing/bathing using AE, and toileting/toilet transfers. Pt educated on w/b status with pt demo good understanding. In sitting, pt presents with difficulty reaching past R calf to doff/don R sock. Therefore, pt issued and educated on use of a reacher to doff socks/don LB clothing, sock aid to don socks, long handled sponge for LB bathing (when appropriate), and long handled shoe horn to don shoes with back. Pt was able to don LB dressing tasks at an SBA level. Pt ambulated to the bathroom for toilet transfers using a Rw, with no LOB noted. Will defer to PT for further functional balance/ mobility tasks. Pt reports he has a shower chair in his walk-in shoewr and is having a raised toilet seat sent home. Pt reports he has a supportive wife and daughter to provide assist PRN. Pt is safe for d/c home when medically stable/cleared by PT. Skilled occupational therapy is not indicated at this time. Discharge Recommendations: None  Further Equipment Recommendations for Discharge: Rolling walker to decrease the risk of falls;  Pt reports he has a shower chair and is receiving a raised toilet seat     SUBJECTIVE:   Patient stated i'm from [place].     OBJECTIVE DATA SUMMARY:     Past Medical History:   Diagnosis Date    Abnormal tilt table test 2013    Back pain     CAD (coronary artery disease)     Coronary artery spasm (HCC)     GERD (gastroesophageal reflux disease)     Hypertension     Kidney stone     Migraine     migraines    Prediabetes     Sleep apnea     uses cpap    Syncope     has had recurrent issues, has had work-up to include EEG, monitor, tilt table, Carotids, ECHO     Past Surgical History:   Procedure Laterality Date    CARDIAC SURG PROCEDURE UNLIST      cardiac catheterization times 5    COLONOSCOPY N/A 9/30/2016    COLONOSCOPY with biopsy performed by Lidia Pitts MD at [facility]    HX BACK SURGERY      x3    HX ENDOSCOPY      reportedly normal by patient    HX HEENT      sinus sx    HX ORTHOPAEDIC      left shoulder, right knee, left thumb, left great toe sx    HX UROLOGICAL      Lithotripsy     Barriers to Learning/Limitations: None  Compensate with: visual, verbal, tactile, kinesthetic cues/model    Home Situation:   Home Situation  Home Environment: Private residence  # Steps to Enter: 0  One/Two Story Residence: Two story  # of Interior Steps: 12  Interior Rails: Right  Living Alone: No  Support Systems: Family member(s)  Patient Expects to be Discharged to[de-identified] Private residence  Current DME Used/Available at Home: Shower chair, Walker, rolling, Wheelchair  Tub or Shower Type: Shower  []     Right hand dominant   [x]     Left hand dominant    Cognitive/Behavioral Status:  Neurologic State: Alert  Orientation Level: Oriented X4  Cognition: Follows commands  Safety/Judgement: Fall prevention    Skin: Visible skin appeared intact  Edema: None noted      Coordination: BUE  Coordination: Within functional limits  Fine Motor Skills-Upper: Left Intact; Right Intact    Gross Motor Skills-Upper: Left Intact; Right Intact    Balance:  Sitting: Intact  Standing: Intact; With support  Standing - Static: Good  Standing - Dynamic : Fair(+)    Strength: BUE  Strength: Within functional limits    Tone & Sensation: BUE  Tone: Normal  Sensation: Intact    Range of Motion: BUE  AROM: Within functional limits         Functional Mobility and Transfers for ADLs:  Bed Mobility:  Pt sitting in recliner upon arrival.  Transfers:  Sit to Stand: Supervision  Stand to Sit: Supervision  Toilet transfer: Supervision    ADL Assessment:  Feeding: Modified independent    Oral Facial Hygiene/Grooming: Modified Independent in sitting; Supervision in standing    Bathing: Supervision(pt issued long handled sponge)    Upper Body Dressing: Modified independent    Lower Body Dressing: Supervision/SBA (pt issued reacher, sock aid, and long handled shoe horn)    Toileting: Supervision      ADL Intervention: At chair level, pt only able to reach down to B calves without AD, presenting with difficulty reaching R foot to doff/don R sock. Issued and educated pt on use a of a reacher to doff socks/don LB clothing, sock aid to don socks, long handled shoe horn to don shoes, and long handled sponge for LB bathing (when appropriate) with pt verbalizing and demonstrating good understanding. Patient practiced LB dressing with use of AE given (reacher, sock aid) after demonstration. No assist needed for doff/donning R sock after practice. Patient requiring SBA to thread BLEs through protective undergarment/pant holes using reacher and to don over hips in standing.   Lower Body Dressing Assistance  Protective Undergarmet: Stand-by assistance  Pants With Button/Zipper: Stand-by assistance  Socks: Modified independent(with reacher and sock aid)  Position Performed: Seated in chair  Adaptive Equipment Used: Reacher;Sock aid    Toileting  Toileting Assistance: Supervision  Clothing Management: Supervision    Cognitive Retraining  Safety/Judgement: Naomy Valencia prevention      Pain:  Pain level pre-treatment: 7-8/10 (R knee)  Pain level post-treatment: \" \"/10   Pain Intervention(s): Medication (see MAR); Rest, Ice, Repositioning  Response to intervention: Nurse notified, See doc flow    Activity Tolerance:   Good    Please refer to the flowsheet for vital signs taken during this treatment. After treatment:   [x]  Patient left in no apparent distress sitting up in chair  []  Patient left in no apparent distress in bed  [x]  Call bell left within reach  [x]  Nursing notified  []  Caregiver present  []  Bed alarm activated    COMMUNICATION/EDUCATION:   [x]      Role of Occupational Therapy in the acute care setting  [x]      Home safety education was provided and the patient/caregiver indicated understanding. [x]      Patient/family have participated as able and agree with findings and recommendations. []      Patient is unable to participate in plan of care at this time. Thank you for this referral.  Mark Montgomery MS, OTR/L  Time Calculation: 38 mins      Eval Complexity: History: LOW Complexity : Brief history review ; Examination: LOW Complexity : 1-3 performance deficits relating to physical, cognitive , or psychosocial skils that result in activity limitations and / or participation restrictions ;    Decision Making:LOW Complexity : No comorbidities that affect functional and no verbal or physical assistance needed to complete eval tasks

## 2020-08-14 NOTE — PROGRESS NOTES
conducted an initial consultation and Spiritual Assessment for Gabi Hanson, who is a 60 y. o.,male. Patient's Primary Language is: [place]. According to the patient's EMR [employer] Affiliation is: Jehovah witness. The reason the Patient came to the hospital is:   Patient Active Problem List    Diagnosis Date Noted    Total knee replacement status, right 08/13/2020    Vasovagal syncope 02/14/2020    Severe obesity (BMI 35.0-39. 9) with comorbidity ([address] 04/27/2018    Recurrent depression ([address] 01/05/2018    Chronic daily headache 10/20/2017    Hyperlipidemia 01/30/2017    DB on CPAP 09/20/2016    Essential hypertension with goal blood pressure less than 140/90 06/13/2016    Coronary artery disease involving native coronary artery of native heart with angina pectoris with documented spasm ([facility] [address] 06/13/2016    Glaucoma of both eyes 06/13/2016    Gastroesophageal reflux disease without esophagitis 06/13/2016    Major depressive disorder with single episode 06/13/2016    Nephrolithiasis 06/13/2016    Autonomic neuropathy 06/13/2016    Prediabetes 06/13/2016        The  provided the following Interventions:  Initiated a relationship of care and support. Explored issues of jose, belief, spirituality and Orthodoxy/ritual needs while hospitalized. Listened empathically. Provided chaplaincy education. Provided information about Spiritual Care Services. Offered assurance of continued prayers on patient's behalf. Chart reviewed. The following outcomes where achieved:  Patient shared limited information about both their medical narrative and spiritual journey/beliefs. Patient processed feeling about current hospitalization. Patient expressed gratitude for 's visit. Assessment:  Patient does not have any Orthodoxy/cultural needs that will affect patient's preferences in health care.   There are no spiritual or Orthodoxy issues which require intervention at this time.     Plan:  Chaplains will continue to follow and will provide pastoral care on an as needed/requested basis.  recommends bedside caregivers page  on duty if patient shows signs of acute spiritual or emotional distress. Chaplain Katya PEREZ  [address] [facility]   [phone]

## 2020-08-14 NOTE — PROGRESS NOTES
Patients is lying comfortable alert awake and oriented, dressings to Rknee dry and intact CMS+ at this time patients assisted to the bathroom and to chair, tolerated well no s/s of distress or sob.

## 2020-08-14 NOTE — HOME CARE
Discharge noted for today. Received home health referral for [facility] for SN and PT - Springer knee protocol. Spoke with patient, explained services and answered all questions. Demographics verified. Referral processed and emailed to central office. Patient has the following DME: cane, raised toilet seat, and rolling walker.  Travis Ch [facility] Liaison

## 2020-08-14 NOTE — PROGRESS NOTES
CM put patient in the queue for [facility], and called # [phone], and spoke to Luis F, and explained last CM with her with [facility], and let her now patient was discharging today.     Rigobertojosé miguel Curran, RN  Case Management [phone]

## 2020-08-14 NOTE — PROGRESS NOTES
OT order received and chart reviewed. Patient seen for skilled OT evaluation and is safe for discharge home when medically stable/cleared by PT. Full note to follow.       Thank you for the referral.    Mabel Bowman MS, OTR/L

## 2020-08-14 NOTE — PROGRESS NOTES
Reason for Admission:  Osteoarthritis of right knee, unspecified osteoarthritis type [M17.11]  Total knee replacement status, right [Z96.651]                 RUR Score:    8%            Plan for utilizing home health:    Yes, FOC verbal consent given for [facility]. Likelihood of Readmission:   LOW                         Transition of Care Plan:              Initial assessment completed with patient. Cognitive status of patient: oriented to time, place, person and situation. Face sheet information confirmed:  yes. The patient designates his wife Amaury Hernández [phone] to participate in his discharge plan and to receive any needed information. This patient lives in a single family home with his wife, with 1 step to enter. Patient is able to navigate steps as needed. Prior to hospitalization, patient was considered to be independent with ADLs/IADLS : yes . Patient has a current ACP document on file: yes  The patient's daughter will be available to transport patient home upon discharge. The patient already has Cane, Rolling Walker, Raised Toilet Seat,  medical equipment available in the home. Patient is not currently active with home health. Patient has not stayed in a skilled nursing facility or rehab. This patient is on dialysis :no    List of available Home Health agencies were provided and reviewed with the patient prior to discharge. [place] of choice verbal consent given: yes, for [facility]. Currently, the discharge plan is Home with [address] [place] [address] Colton Campos. The patient states that he can obtain his medications from the pharmacy, and take his medications as directed. Patient's current insurance is [place]. Care Management Interventions  PCP Verified by CM:  Yes  Last Visit to PCP: 08/06/20  Mode of Transport at Discharge: Self(Patient's daughter will be transporting patient home at time of discharge. )  Transition of Care Consult (CM Consult): [address]: Yes  Discharge Durable Medical Equipment: No  Physical Therapy Consult: Yes  Occupational Therapy Consult: No  Speech Therapy Consult: No  Current Support Network: Lives with Spouse  Confirm Follow Up Transport: Family  The Plan for Transition of Care is Related to the Following Treatment Goals : Home with home health  The Patient and/or Patient Representative was Provided with a Choice of Provider and Agrees with the Discharge Plan?: Yes  Freedom of Choice List was Provided with Basic Dialogue that Supports the Patient's Individualized Plan of Care/Goals, Treatment Preferences and Shares the Quality Data Associated with the Providers?: Yes  Discharge Location  Discharge Placement: Home with home health        Tiney Goodpasture, RN  Case Management [phone]

## 2020-08-14 NOTE — PROGRESS NOTES
Patient seen & examined at bedside this AM.  Resting comfortably in bed. He walked with PT yesterday to the bathroom. Pain well controlled. No complaints. Denies n/v, no fever/chills, no CP/SOB. PE:    R Knee:    Dressing c/d/i, ace wrap removed  Moderate effusion  Calves soft/NT  +EHL/FHL/TA/GC  SILT distally  2+ DP Pulse    A/P: 59 yo M s/p R TKA POD#1  -pain control  -DVT ppx (ASA BID, SCD's, Ambulate)  -PT  -OT  -WBAT RLE  -Plan to discharge home today with home health PT and nursing care. Patient should receive Dr. Cisco Wu post op TKA instructions.  -Follow up in the office in 2 weeks for regularly scheduled appointment.

## 2020-08-14 NOTE — PROGRESS NOTES
Discharge order noted for today. Pt has been accepted to [facility] agency. Met with patient and he is  agreeable to the transition plan today. Transport has been arranged through patient's daughter. Patient's  home health  orders have been forwarded to [facility] home health  agency via [address]   Discharge information has been documented on the AVS.       Indira Black RN  Case Management [phone]

## 2020-08-14 NOTE — PROGRESS NOTES
Problem: Mobility Impaired (Adult and Pediatric)  Goal: *Acute Goals and Plan of Care (Insert Text)  Description: Physical Therapy Goals  Initiated 8/13/2020 and to be accomplished within 7 day(s) on 8/20/2020  1. Patient will move from supine to sit and sit to supine , scoot up and down, and roll side to side in bed with modified independence. 2.  Patient will transfer from bed to chair and chair to bed with supervision/set-up using the least restrictive device. 3.  Patient will perform sit to stand with modified independence. 4.  Patient will ambulate with supervision/set-up for 150 feet with the least restrictive device. 5.  Patient will ascend/descend 4 stairs with right handrail(s) with contact guard assist.     Prior Level of Function:   Patient was independence for all mobility including gait using no assistive device. Patient lives with family in a 2 story home with 0 steps to enter with bedroom on second level. Patient reports having WC, RW, and shower chair at home. Outcome: Progressing Towards Goal     PHYSICAL THERAPY TREATMENT    Patient: Chayo Cuevas (3 y.o. male)  Date: 8/14/2020  Diagnosis: Osteoarthritis of right knee, unspecified osteoarthritis type [M17.11]  Total knee replacement status, right [Z96.651]   <principal problem not specified>  Procedure(s) (LRB):  RIGHT TOTAL KNEE REPLACEMENT/NEEL/2 SA'S/ADDUCTOR BLOCK (Right) 1 Day Post-Op  Precautions: Fall, WBAT(right TKA )  PLOF: see above    ASSESSMENT:  Pt cleared for PT treatment session per nursing. Pt received sitting up in bedside chair, feet elevated, and agreeable to treatment session. Pt required Supv for sit to stand transfers, demos appropriate sequencing and hand placement with respect to safety. SBA for ambulation of 200 ft with RW, demos step-to gait pattern secondary to R knee pain. Instructed in stair negotiation to ascend/descend 8 stairs with right hand rail and SBA.  Returned to sitting and educated on towel roll under the ankle, nothing under the knee, and seated/long-sitting TherEx; pt verbalizes and demos understanding. Pt left sitting up in chair, R ankle propped to promote full knee extension, and all needs met/within reach. Progression toward goals:   [x]      Improving appropriately and progressing toward goals  []      Improving slowly and progressing toward goals  []      Not making progress toward goals and plan of care will be adjusted     PLAN:  Patient continues to benefit from skilled intervention to address the above impairments. Continue treatment per established plan of care. Discharge Recommendations:  Home Health  Further Equipment Recommendations for Discharge:  N/A, pt states he has 2 RW at home     SUBJECTIVE:   Patient stated My wife and I were nervous about how I was going to do my stairs, but that wasn't too bad. Going down the stairs was actually easy.     OBJECTIVE DATA SUMMARY:   Critical Behavior:  Neurologic State: Alert  Orientation Level: Oriented X4  Cognition: Follows commands     Functional Mobility Training:  Bed Mobility:  Pt sitting up in chair upon arrival.  Transfers:  Sit to Stand: Supervision  Stand to Sit: Supervision  Balance:  Sitting: Intact  Standing: Intact; With support  Standing - Static: Good  Standing - Dynamic : Fair(+)   Ambulation/Gait Training:  Distance (ft): 200 Feet (ft)  Assistive Device: Walker, rolling  Ambulation - Level of Assistance: Stand-by assistance  Gait Abnormalities: Step to gait; Decreased step clearance; Antalgic  Speed/Lisseth: Slow  Step Length: Right shortened;Left shortened  Stairs:  Number of Stairs Trained: 8  Stairs - Level of Assistance: Stand-by assistance  Rail Use: Right     Therapeutic Exercises:   Reviewed towel roll under the ankle, nothing under the knee, ankle pumps, QS, and heel slides 02D/SX, ice application no greater than 20 min or until numbness is reached.       Pain:  Pain level pre-treatment: 3-4/10  Pain level post-treatment: 3-4/10     Activity Tolerance:   Good  Please refer to the flowsheet for vital signs taken during this treatment. After treatment:   [x] Patient left in no apparent distress sitting up in chair  [] Patient left in no apparent distress in bed  [x] Call bell left within reach  [x] Nursing notified  [] Caregiver present  [] Bed alarm activated  [] SCDs applied      COMMUNICATION/EDUCATION:   [x]         Role of Physical Therapy in the acute care setting. [x]         Fall prevention education was provided and the patient/caregiver indicated understanding. [x]         Patient/family have participated as able in working toward goals and plan of care. []         Patient/family agree to work toward stated goals and plan of care. []         Patient understands intent and goals of therapy, but is neutral about his/her participation.   []         Patient is unable to participate in stated goals/plan of care: ongoing with therapy staff.  []         Other:        Lisette Buerger, PTA   Time Calculation: 27 mins

## 2020-08-15 ENCOUNTER — HOME CARE VISIT (OUTPATIENT)
Dept: SCHEDULING | Facility: HOME HEALTH | Age: 55
End: 2020-08-15
Payer: COMMERCIAL

## 2020-08-15 VITALS
RESPIRATION RATE: 16 BRPM | DIASTOLIC BLOOD PRESSURE: 80 MMHG | HEART RATE: 109 BPM | SYSTOLIC BLOOD PRESSURE: 130 MMHG | TEMPERATURE: 98.3 F | OXYGEN SATURATION: 98 %

## 2020-08-15 PROCEDURE — G0151 HHCP-SERV OF PT,EA 15 MIN: HCPCS

## 2020-08-15 PROCEDURE — 400013 HH SOC

## 2020-08-16 ENCOUNTER — HOME CARE VISIT (OUTPATIENT)
Dept: SCHEDULING | Facility: HOME HEALTH | Age: 55
End: 2020-08-16
Payer: COMMERCIAL

## 2020-08-16 VITALS
OXYGEN SATURATION: 95 % | HEART RATE: 111 BPM | SYSTOLIC BLOOD PRESSURE: 110 MMHG | TEMPERATURE: 97.9 F | DIASTOLIC BLOOD PRESSURE: 84 MMHG

## 2020-08-16 PROCEDURE — G0299 HHS/HOSPICE OF RN EA 15 MIN: HCPCS

## 2020-08-16 PROCEDURE — G0157 HHC PT ASSISTANT EA 15: HCPCS

## 2020-08-17 ENCOUNTER — HOME CARE VISIT (OUTPATIENT)
Dept: SCHEDULING | Facility: HOME HEALTH | Age: 55
End: 2020-08-17
Payer: COMMERCIAL

## 2020-08-17 ENCOUNTER — HOME CARE VISIT (OUTPATIENT)
Dept: HOME HEALTH SERVICES | Facility: HOME HEALTH | Age: 55
End: 2020-08-17
Payer: COMMERCIAL

## 2020-08-17 VITALS
SYSTOLIC BLOOD PRESSURE: 114 MMHG | RESPIRATION RATE: 18 BRPM | OXYGEN SATURATION: 97 % | TEMPERATURE: 98.3 F | DIASTOLIC BLOOD PRESSURE: 70 MMHG | HEART RATE: 82 BPM

## 2020-08-17 PROCEDURE — G0157 HHC PT ASSISTANT EA 15: HCPCS

## 2020-08-17 PROCEDURE — A6255 ABSORPT DRG >16<=48 IN W/BDR: HCPCS

## 2020-08-18 ENCOUNTER — HOME CARE VISIT (OUTPATIENT)
Dept: SCHEDULING | Facility: HOME HEALTH | Age: 55
End: 2020-08-18
Payer: COMMERCIAL

## 2020-08-18 ENCOUNTER — VIRTUAL VISIT (OUTPATIENT)
Dept: FAMILY MEDICINE CLINIC | Age: 55
End: 2020-08-18

## 2020-08-18 DIAGNOSIS — M17.11 ARTHRITIS OF RIGHT KNEE: Primary | ICD-10-CM

## 2020-08-18 DIAGNOSIS — Z96.651 S/P TKR (TOTAL KNEE REPLACEMENT), RIGHT: ICD-10-CM

## 2020-08-18 PROCEDURE — G0157 HHC PT ASSISTANT EA 15: HCPCS

## 2020-08-18 RX ORDER — HYDROMORPHONE HYDROCHLORIDE 2 MG/1
2-4 TABLET ORAL
COMMUNITY
End: 2020-10-19

## 2020-08-18 NOTE — PATIENT INSTRUCTIONS
Total Knee Replacement: What to Expect at Home Your Recovery You had a total knee replacement. The doctor replaced the worn ends of the bones that connect to your knee (thighbone and lower leg bone) with plastic and metal parts. When you leave the hospital, you should be able to move around with a walker or crutches. But you will need someone to help you at home for the next few weeks or until you have more energy and can move around better. If you need more extensive rehab, you may go to a specialized rehab center for more treatment. You will go home with a bandage and stitches, staples, tissue glue, or tape strips. Change the bandage as your doctor tells you to. If you have stitches or staples, your doctor will remove them 10 to 21 days after your surgery. Glue or tape strips will fall off on their own over time. You may still have some mild pain, and the area may be swollen for 3 to 6 months after surgery. Your knee will continue to improve for 6 to 12 months. You will probably use a walker for 1 to 3 weeks and then use crutches. When you are ready, you can use a cane. You will probably be able to walk on your own in 4 to 8 weeks. You will need to do months of physical rehabilitation (rehab) after a knee replacement. Rehab will help you strengthen the muscles of the knee and help you regain movement. After you recover, your artificial knee will allow you to do normal daily activities with less pain or no pain at all. You may be able to hike, dance, ride a bike, and play golf. Talk to your doctor about whether you can do more strenuous activities. Always tell your caregivers that you have an artificial knee. How long it will take to walk on your own, return to normal activities, and go back to work depends on your health and how well your rehabilitation (rehab) program goes. The better you do with your rehab exercises, the quicker you will get your strength and movement back. This care sheet gives you a general idea about how long it will take for you to recover. But each person recovers at a different pace. Follow the steps below to get better as quickly as possible. How can you care for yourself at home? Activity · Rest when you feel tired. You may take a nap, but don't stay in bed all day. When you sit, use a chair with arms. You can use the arms to help you stand up. · Work with your physical therapist to find the best way to exercise. What you can do as your knee heals will depend on whether your new knee is cemented or uncemented. You may not be able to do certain things for a while if your new knee is uncemented. · After your knee has healed enough, you can do more strenuous activities with caution. ? You can golf, but use a golf cart. And don't wear shoes with spikes. ? You can bike on a flat road or on a stationary bike. Avoid biking up hills. ? Your doctor may suggest that you stay away from activities that put stress on your knee. These include tennis, badminton, squash, racquetball, contact sports like football, jumping (such as in basketball), jogging, and running. ? Avoid activities where you might fall. These include horseback riding, skiing, and mountain biking. · Do not sit for more than 1 hour at a time. Get up and walk around for a while before you sit again. If you must sit for a long time, prop up your leg with a chair or footstool. This will help you avoid swelling. · Ask your doctor when you can drive again. It may take up to 8 weeks after knee replacement surgery before it's safe for you to drive. · When you get into a car, sit on the edge of the seat. Then pull in your legs, and turn to face the front. · You should be able to do many everyday activities 3 to 6 weeks after your surgery. You will probably need to take 4 to 16 weeks off from work. When you can go back to work depends on the type of work you do and how you feel. · Ask your doctor when it is okay for you to have sex. · For 12 weeks, do not lift anything heavier than 10 pounds and do not lift weights. Diet · By the time you leave the hospital, you should be eating your normal diet. If your stomach is upset, try bland, low-fat foods like plain rice, broiled chicken, toast, and yogurt. Your doctor may suggest that you take iron and vitamin supplements. · Drink plenty of fluids (unless your doctor tells you not to). · Eat healthy foods, and watch your portion sizes. Try to stay at your ideal weight. Too much weight puts more stress on your new knee. · You may notice that your bowel movements are not regular right after your surgery. This is common. Try to avoid constipation and straining with bowel movements. You may want to take a fiber supplement every day. If you have not had a bowel movement after a couple of days, ask your doctor about taking a mild laxative. Medicines · Your doctor will tell you if and when you can restart your medicines. He or she will also give you instructions about taking any new medicines. · If you take aspirin or some other blood thinner, ask your doctor if and when to start taking it again. Make sure that you understand exactly what your doctor wants you to do. · Your doctor may give you a blood-thinning medicine to prevent blood clots. If you take a blood thinner, be sure you get instructions about how to take your medicine safely. Blood thinners can cause serious bleeding problems. This medicine could be in pill form or as a shot (injection). If a shot is needed, your doctor will tell you how to do this. · Be safe with medicines. Take pain medicines exactly as directed. ? If the doctor gave you a prescription medicine for pain, take it as prescribed. ? If you are not taking a prescription pain medicine, ask your doctor if you can take an over-the-counter medicine. ? Plan to take your pain medicine 30 minutes before exercises. It is easier to prevent pain before it starts than to stop it after it has started. · If you think your pain medicine is making you sick to your stomach: 
? Take your medicine after meals (unless your doctor has told you not to). ? Ask your doctor for a different pain medicine. · If your doctor prescribed antibiotics, take them as directed. Do not stop taking them just because you feel better. You need to take the full course of antibiotics. Incision care · If your doctor told you how to care for your cut (incision), follow your doctor's instructions. You will have a dressing over the cut. A dressing helps the incision heal and protects it. Your doctor will tell you how to take care of this. · If you did not get instructions, follow this general advice: ? If you have strips of tape on the cut the doctor made, leave the tape on for a week or until it falls off. 
? If you have stitches or staples, your doctor will tell you when to come back to have them removed. ? If you have skin adhesive on the cut, leave it on until it falls off. Skin adhesive is also called glue or liquid stitches. ? Change the bandage every day. ? Wash the area daily with warm water, and pat it dry. Don't use hydrogen peroxide or alcohol. They can slow healing. ? You may cover the area with a gauze bandage if it oozes fluid or rubs against clothing. ? You may shower 24 to 48 hours after surgery. Pat the incision dry. Don't swim or take a bath for the first 2 weeks, or until your doctor tells you it is okay. Exercise · Your rehab program will give you a number of exercises to do to help you get back your knee's range of motion and strength. Always do them as your therapist tells you. Ice · For pain and swelling, put ice or a cold pack on the area for 10 to 20 minutes at a time. Put a thin cloth between the ice and your skin. Other instructions · Keep wearing your support stockings as your doctor says. These help to prevent blood clots. How long you'll have to wear them depends on your activity level and the amount of swelling. · Carry a medical alert card that says you have an artificial joint. You have metal pieces in your knee. These may set off some airport metal detectors. Follow-up care is a key part of your treatment and safety. Be sure to make and go to all appointments, and call your doctor if you are having problems. It's also a good idea to know your test results and keep a list of the medicines you take. When should you call for help? [id number] anytime you think you may need emergency care. For example, call if: 
· You passed out (lost consciousness). · You have severe trouble breathing. · You have sudden chest pain and shortness of breath, or you cough up blood. Call your doctor now or seek immediate medical care if: 
· You have signs of infection, such as: 
? Increased pain, swelling, warmth, or redness. ? Red streaks leading from the incision. ? Pus draining from the incision. ? A fever. · You have signs of a blood clot, such as: 
? Pain in your calf, back of the knee, thigh, or groin. ? Redness and swelling in your leg or groin. · Your incision comes open and begins to bleed, or the bleeding increases. · You have pain that does not get better after you take pain medicine. Watch closely for changes in your health, and be sure to contact your doctor if: 
· You do not have a bowel movement after taking a laxative. Where can you learn more? Go to http://sandra-ramana.info/ Enter U860 in the search box to learn more about \"Total Knee Replacement: What to Expect at Home. \" Current as of: March 2, 2020               Content Version: 12.5 © 5088-8757 [employer], [employer]. Care instructions adapted under license by [employer] (which disclaims liability or warranty for this information). If you have questions about a medical condition or this instruction, always ask your healthcare professional. [address] any warranty or liability for your use of this information.

## 2020-08-18 NOTE — PROGRESS NOTES
1. Have you been to the ER, urgent care clinic since your last visit? Hospitalized since your last visit? Yes Where: [facility]     2. Have you seen or consulted any other health care providers outside of the [address] [place] [address] since your last visit? Include any pap smears or colon screening.  Yes Where: Dr. Jose Murrell

## 2020-08-18 NOTE — PROGRESS NOTES
Anni Germain, who was evaluated through a synchronous (real-time) audio-video encounter, and/or his healthcare decision maker, is aware that it is a billable service, with coverage as determined by his insurance carrier. He provided verbal consent to proceed: Yes, and patient identification was verified. It was conducted pursuant to the emergency declaration under the [facility], [facility] authority and the [employer] General Act. A caregiver was present when appropriate. Ability to conduct physical exam was limited. I was in the office. The patient was at home. Chief Complaint   Patient presents with   [facility] Follow Up     [address] for right TKR performed by Dr. Ailyn Flanagan on 8/13/2020     SUBJECTIVE    Patient presents for hospital follow up. We reviewed the recent hospitalization in detail. The patient reports doing much better. The patient denies any complaints at this time. He had a right TKR on 8/13/2020. Pain is better controlled after a medication change yesterday. Has home health currently. Nurse comes in weekly. PT coming in everyday. ROS:  History obtained from the patient  · General: negative for - chills, fever  · Respiratory: no cough, shortness of breath, or wheezing  · Cardiovascular: no chest pain, palpitations, or dyspnea on exertion  · Musculoskeletal: knee is swelling postsurgically as expected  · Neurological: no numbness, tingling, or weakness in leg  · : no hematuria, dysuria, frequency, hesitancy, or nocturia. No catheter during procedure he says. OBJECTIVE    General:  Alert, cooperative, well appearing, in no apparent distress. Lungs: Inspiratory and expiratory efforts are full and unlabored. ASSESSMENT / PLAN    ICD-10-CM ICD-9-CM    1. Arthritis of right knee  M17.11 716.96    2. S/P TKR (total knee replacement), right  Z96.651 V43.65      Doing well. Cont per orthopedics. Cont home health and PT. All chart history elements were reviewed by me at the time of the visit even though marked at time of note closure. Patient understands our medical plan. Patient has provided input and agrees with goals. Alternatives have been explained and offered. All questions answered. The patient is to call if condition worsens or fails to improve. RTC as scheduled in Jan with me.

## 2020-08-19 ENCOUNTER — HOME CARE VISIT (OUTPATIENT)
Dept: SCHEDULING | Facility: HOME HEALTH | Age: 55
End: 2020-08-19
Payer: COMMERCIAL

## 2020-08-19 PROCEDURE — G0157 HHC PT ASSISTANT EA 15: HCPCS

## 2020-08-20 ENCOUNTER — HOME CARE VISIT (OUTPATIENT)
Dept: SCHEDULING | Facility: HOME HEALTH | Age: 55
End: 2020-08-20
Payer: COMMERCIAL

## 2020-08-20 VITALS
TEMPERATURE: 98.1 F | SYSTOLIC BLOOD PRESSURE: 140 MMHG | HEART RATE: 98 BPM | DIASTOLIC BLOOD PRESSURE: 76 MMHG | OXYGEN SATURATION: 97 % | RESPIRATION RATE: 17 BRPM

## 2020-08-20 PROCEDURE — G0299 HHS/HOSPICE OF RN EA 15 MIN: HCPCS

## 2020-08-20 PROCEDURE — G0151 HHCP-SERV OF PT,EA 15 MIN: HCPCS

## 2020-08-21 ENCOUNTER — HOME CARE VISIT (OUTPATIENT)
Dept: SCHEDULING | Facility: HOME HEALTH | Age: 55
End: 2020-08-21
Payer: COMMERCIAL

## 2020-08-21 VITALS
DIASTOLIC BLOOD PRESSURE: 82 MMHG | OXYGEN SATURATION: 97 % | SYSTOLIC BLOOD PRESSURE: 130 MMHG | HEART RATE: 102 BPM | RESPIRATION RATE: 17 BRPM | TEMPERATURE: 98.4 F

## 2020-08-21 PROCEDURE — G0157 HHC PT ASSISTANT EA 15: HCPCS

## 2020-08-22 ENCOUNTER — HOME CARE VISIT (OUTPATIENT)
Dept: SCHEDULING | Facility: HOME HEALTH | Age: 55
End: 2020-08-22
Payer: COMMERCIAL

## 2020-08-22 VITALS
DIASTOLIC BLOOD PRESSURE: 67 MMHG | HEART RATE: 106 BPM | TEMPERATURE: 98.1 F | OXYGEN SATURATION: 97 % | SYSTOLIC BLOOD PRESSURE: 143 MMHG | RESPIRATION RATE: 18 BRPM

## 2020-08-22 VITALS
DIASTOLIC BLOOD PRESSURE: 78 MMHG | OXYGEN SATURATION: 98 % | HEART RATE: 116 BPM | RESPIRATION RATE: 17 BRPM | SYSTOLIC BLOOD PRESSURE: 132 MMHG | TEMPERATURE: 99.3 F

## 2020-08-22 PROCEDURE — G0157 HHC PT ASSISTANT EA 15: HCPCS

## 2020-08-23 ENCOUNTER — HOME CARE VISIT (OUTPATIENT)
Dept: SCHEDULING | Facility: HOME HEALTH | Age: 55
End: 2020-08-23
Payer: COMMERCIAL

## 2020-08-23 VITALS
TEMPERATURE: 97.7 F | HEART RATE: 99 BPM | RESPIRATION RATE: 16 BRPM | OXYGEN SATURATION: 97 % | SYSTOLIC BLOOD PRESSURE: 149 MMHG | DIASTOLIC BLOOD PRESSURE: 72 MMHG

## 2020-08-23 PROCEDURE — G0157 HHC PT ASSISTANT EA 15: HCPCS

## 2020-08-24 ENCOUNTER — HOME CARE VISIT (OUTPATIENT)
Dept: SCHEDULING | Facility: HOME HEALTH | Age: 55
End: 2020-08-24
Payer: COMMERCIAL

## 2020-08-24 VITALS
HEART RATE: 104 BPM | TEMPERATURE: 98.4 F | DIASTOLIC BLOOD PRESSURE: 74 MMHG | SYSTOLIC BLOOD PRESSURE: 148 MMHG | OXYGEN SATURATION: 98 % | RESPIRATION RATE: 16 BRPM

## 2020-08-24 VITALS
SYSTOLIC BLOOD PRESSURE: 136 MMHG | DIASTOLIC BLOOD PRESSURE: 78 MMHG | SYSTOLIC BLOOD PRESSURE: 136 MMHG | OXYGEN SATURATION: 97 % | HEART RATE: 102 BPM | TEMPERATURE: 98.5 F | TEMPERATURE: 98.4 F | DIASTOLIC BLOOD PRESSURE: 76 MMHG | RESPIRATION RATE: 17 BRPM | RESPIRATION RATE: 17 BRPM | HEART RATE: 101 BPM | OXYGEN SATURATION: 97 %

## 2020-08-24 PROCEDURE — G0157 HHC PT ASSISTANT EA 15: HCPCS

## 2020-08-25 ENCOUNTER — HOME CARE VISIT (OUTPATIENT)
Dept: SCHEDULING | Facility: HOME HEALTH | Age: 55
End: 2020-08-25
Payer: COMMERCIAL

## 2020-08-25 PROCEDURE — G0157 HHC PT ASSISTANT EA 15: HCPCS

## 2020-08-25 PROCEDURE — G0300 HHS/HOSPICE OF LPN EA 15 MIN: HCPCS

## 2020-08-25 NOTE — Clinical Note
Thank you. MD  ----- Message -----  From: Kassandra Williamson LPN  Sent: 3/53/4709   6:36 AM EDT  To: Jamari Bean, CHAI      Pt meeting all SN goals at this time and is clinically discharged and documentation finalized for completion of discipline discharge. Please continue with other therapies and complete final DC. Thank you!

## 2020-08-26 ENCOUNTER — HOME CARE VISIT (OUTPATIENT)
Dept: SCHEDULING | Facility: HOME HEALTH | Age: 55
End: 2020-08-26
Payer: COMMERCIAL

## 2020-08-26 VITALS
DIASTOLIC BLOOD PRESSURE: 88 MMHG | TEMPERATURE: 97.5 F | HEART RATE: 76 BPM | SYSTOLIC BLOOD PRESSURE: 152 MMHG | OXYGEN SATURATION: 98 %

## 2020-08-26 PROCEDURE — G0157 HHC PT ASSISTANT EA 15: HCPCS

## 2020-08-27 ENCOUNTER — HOME CARE VISIT (OUTPATIENT)
Dept: SCHEDULING | Facility: HOME HEALTH | Age: 55
End: 2020-08-27
Payer: COMMERCIAL

## 2020-08-27 PROCEDURE — G0157 HHC PT ASSISTANT EA 15: HCPCS

## 2020-08-28 ENCOUNTER — HOME CARE VISIT (OUTPATIENT)
Dept: SCHEDULING | Facility: HOME HEALTH | Age: 55
End: 2020-08-28
Payer: COMMERCIAL

## 2020-08-28 PROCEDURE — G0157 HHC PT ASSISTANT EA 15: HCPCS

## 2020-08-29 ENCOUNTER — HOME CARE VISIT (OUTPATIENT)
Dept: SCHEDULING | Facility: HOME HEALTH | Age: 55
End: 2020-08-29
Payer: COMMERCIAL

## 2020-08-29 VITALS
OXYGEN SATURATION: 98 % | RESPIRATION RATE: 18 BRPM | SYSTOLIC BLOOD PRESSURE: 132 MMHG | DIASTOLIC BLOOD PRESSURE: 82 MMHG | HEART RATE: 102 BPM | TEMPERATURE: 98.1 F

## 2020-08-29 PROCEDURE — G0157 HHC PT ASSISTANT EA 15: HCPCS

## 2020-08-30 ENCOUNTER — HOME CARE VISIT (OUTPATIENT)
Dept: SCHEDULING | Facility: HOME HEALTH | Age: 55
End: 2020-08-30
Payer: COMMERCIAL

## 2020-08-30 VITALS
SYSTOLIC BLOOD PRESSURE: 130 MMHG | RESPIRATION RATE: 13 BRPM | TEMPERATURE: 98.2 F | HEART RATE: 103 BPM | DIASTOLIC BLOOD PRESSURE: 74 MMHG | OXYGEN SATURATION: 97 %

## 2020-08-30 PROCEDURE — G0157 HHC PT ASSISTANT EA 15: HCPCS

## 2020-08-31 ENCOUNTER — HOME CARE VISIT (OUTPATIENT)
Dept: SCHEDULING | Facility: HOME HEALTH | Age: 55
End: 2020-08-31
Payer: COMMERCIAL

## 2020-08-31 VITALS
RESPIRATION RATE: 17 BRPM | TEMPERATURE: 98.2 F | HEART RATE: 103 BPM | SYSTOLIC BLOOD PRESSURE: 132 MMHG | DIASTOLIC BLOOD PRESSURE: 82 MMHG | SYSTOLIC BLOOD PRESSURE: 130 MMHG | DIASTOLIC BLOOD PRESSURE: 80 MMHG | RESPIRATION RATE: 17 BRPM | OXYGEN SATURATION: 98 % | DIASTOLIC BLOOD PRESSURE: 80 MMHG | HEART RATE: 108 BPM | OXYGEN SATURATION: 98 % | TEMPERATURE: 98.1 F | HEART RATE: 112 BPM | RESPIRATION RATE: 18 BRPM | OXYGEN SATURATION: 97 % | SYSTOLIC BLOOD PRESSURE: 132 MMHG | TEMPERATURE: 98.2 F

## 2020-08-31 VITALS
RESPIRATION RATE: 14 BRPM | DIASTOLIC BLOOD PRESSURE: 72 MMHG | OXYGEN SATURATION: 97 % | TEMPERATURE: 98.1 F | HEART RATE: 107 BPM | SYSTOLIC BLOOD PRESSURE: 126 MMHG

## 2020-08-31 PROCEDURE — G0157 HHC PT ASSISTANT EA 15: HCPCS

## 2020-09-01 ENCOUNTER — HOME CARE VISIT (OUTPATIENT)
Dept: SCHEDULING | Facility: HOME HEALTH | Age: 55
End: 2020-09-01
Payer: COMMERCIAL

## 2020-09-01 VITALS
OXYGEN SATURATION: 98 % | SYSTOLIC BLOOD PRESSURE: 140 MMHG | TEMPERATURE: 98.2 F | RESPIRATION RATE: 18 BRPM | DIASTOLIC BLOOD PRESSURE: 84 MMHG | HEART RATE: 104 BPM

## 2020-09-01 VITALS
RESPIRATION RATE: 18 BRPM | DIASTOLIC BLOOD PRESSURE: 84 MMHG | SYSTOLIC BLOOD PRESSURE: 142 MMHG | OXYGEN SATURATION: 97 % | TEMPERATURE: 98.3 F | HEART RATE: 112 BPM

## 2020-09-01 PROCEDURE — G0151 HHCP-SERV OF PT,EA 15 MIN: HCPCS

## 2020-09-02 ENCOUNTER — HOME CARE VISIT (OUTPATIENT)
Dept: SCHEDULING | Facility: HOME HEALTH | Age: 55
End: 2020-09-02
Payer: COMMERCIAL

## 2020-09-02 PROCEDURE — G0157 HHC PT ASSISTANT EA 15: HCPCS

## 2020-09-03 ENCOUNTER — HOME CARE VISIT (OUTPATIENT)
Dept: SCHEDULING | Facility: HOME HEALTH | Age: 55
End: 2020-09-03
Payer: COMMERCIAL

## 2020-09-03 VITALS
RESPIRATION RATE: 17 BRPM | TEMPERATURE: 98.1 F | HEART RATE: 114 BPM | DIASTOLIC BLOOD PRESSURE: 86 MMHG | OXYGEN SATURATION: 97 % | SYSTOLIC BLOOD PRESSURE: 146 MMHG

## 2020-09-03 PROCEDURE — G0157 HHC PT ASSISTANT EA 15: HCPCS

## 2020-09-04 ENCOUNTER — HOME CARE VISIT (OUTPATIENT)
Dept: SCHEDULING | Facility: HOME HEALTH | Age: 55
End: 2020-09-04
Payer: COMMERCIAL

## 2020-09-04 VITALS
TEMPERATURE: 98.1 F | SYSTOLIC BLOOD PRESSURE: 124 MMHG | DIASTOLIC BLOOD PRESSURE: 70 MMHG | HEART RATE: 106 BPM | RESPIRATION RATE: 16 BRPM | OXYGEN SATURATION: 98 %

## 2020-09-04 PROCEDURE — G0151 HHCP-SERV OF PT,EA 15 MIN: HCPCS

## 2020-09-07 VITALS
TEMPERATURE: 98.2 F | OXYGEN SATURATION: 96 % | HEART RATE: 98 BPM | SYSTOLIC BLOOD PRESSURE: 140 MMHG | RESPIRATION RATE: 18 BRPM | DIASTOLIC BLOOD PRESSURE: 82 MMHG

## 2020-09-08 DIAGNOSIS — F32.4 MAJOR DEPRESSIVE DISORDER WITH SINGLE EPISODE, IN PARTIAL REMISSION (HCC): ICD-10-CM

## 2020-09-09 NOTE — PROGRESS NOTES
PT D/C:  Pt has made great progress during this therapy episode; ROM has improved in R knee flex from 12 - 38 degs to 0 - 107 degs enabling pt to perform stairs and car transfers w/o difficulty; MMT has progressed from R hip flex 3-/5, R knee flex/ext 2+ - 3-/5 at [facility] to R hip flex 4+/5, R knee flex/ext 4+/5 at d/c; bed mob w/ (I) from Min (A); transfers with (I) from all surfaces including shower/car [facility] [address] [facility]; gait us ing RW at 30 ft w/ SBA has progressed to (I) using SPC at > 300 ft; pt conts to have low grade pain at 2/10 at the worst within the past 24 hrs but has greatly improved from 7-8/10 at [facility]; pt would benefit from further PT to increase AROM in R knee and to increase (I) w/ mobility to enable pt to transition from [facility] to no assitive device w/o limping gait. Pt has met all goals and will therefore d/c home PT services.   Thanks for your ref Trinity Sommers, MPT

## 2020-09-10 RX ORDER — SERTRALINE HYDROCHLORIDE 100 MG/1
TABLET, FILM COATED ORAL
Qty: 90 TAB | Refills: 0 | Status: SHIPPED | OUTPATIENT
Start: 2020-09-10 | End: 2020-11-30

## 2020-10-21 DIAGNOSIS — I25.111 CORONARY ARTERY DISEASE INVOLVING NATIVE CORONARY ARTERY OF NATIVE HEART WITH ANGINA PECTORIS WITH DOCUMENTED SPASM (HCC): Primary | ICD-10-CM

## 2020-10-21 RX ORDER — NITROGLYCERIN 400 UG/1
1 SPRAY ORAL
Qty: 1 BOTTLE | Refills: 0 | Status: SHIPPED | OUTPATIENT
Start: 2020-10-21 | End: 2020-12-21

## 2020-10-21 NOTE — TELEPHONE ENCOUNTER
Requested Prescriptions     Pending Prescriptions Disp Refills    nitroglycerin (NITROLINGUAL) 400 mcg/spray spray 1 Bottle 1     Si Spray by SubLINGual route every five (5) minutes as needed for Chest Pain for up to 3 doses.

## 2020-11-28 DIAGNOSIS — F32.4 MAJOR DEPRESSIVE DISORDER WITH SINGLE EPISODE, IN PARTIAL REMISSION (HCC): ICD-10-CM

## 2020-11-30 RX ORDER — SERTRALINE HYDROCHLORIDE 100 MG/1
TABLET, FILM COATED ORAL
Qty: 90 TAB | Refills: 0 | Status: SHIPPED | OUTPATIENT
Start: 2020-11-30 | End: 2021-02-18

## 2020-12-10 DIAGNOSIS — E78.5 HYPERLIPIDEMIA, UNSPECIFIED HYPERLIPIDEMIA TYPE: ICD-10-CM

## 2020-12-11 RX ORDER — ROSUVASTATIN CALCIUM 10 MG/1
TABLET, COATED ORAL
Qty: 90 TAB | Refills: 0 | Status: SHIPPED | OUTPATIENT
Start: 2020-12-11 | End: 2021-02-02

## 2020-12-21 DIAGNOSIS — I25.111 CORONARY ARTERY DISEASE INVOLVING NATIVE CORONARY ARTERY OF NATIVE HEART WITH ANGINA PECTORIS WITH DOCUMENTED SPASM (HCC): ICD-10-CM

## 2020-12-21 RX ORDER — NITROGLYCERIN 400 UG/1
SPRAY ORAL
Qty: 4.9 G | Refills: 3 | Status: SHIPPED | OUTPATIENT
Start: 2020-12-21 | End: 2022-01-27

## 2020-12-24 DIAGNOSIS — R51.9 CHRONIC DAILY HEADACHE: ICD-10-CM

## 2020-12-24 RX ORDER — NORTRIPTYLINE HYDROCHLORIDE 25 MG/1
CAPSULE ORAL
Qty: 90 CAP | Refills: 1 | Status: SHIPPED | OUTPATIENT
Start: 2020-12-24 | End: 2021-06-18

## 2021-01-06 ENCOUNTER — TELEPHONE (OUTPATIENT)
Dept: FAMILY MEDICINE CLINIC | Age: 56
End: 2021-01-06

## 2021-01-11 DIAGNOSIS — K21.9 GASTROESOPHAGEAL REFLUX DISEASE WITHOUT ESOPHAGITIS: ICD-10-CM

## 2021-01-12 ENCOUNTER — TELEPHONE (OUTPATIENT)
Dept: PULMONOLOGY | Age: 56
End: 2021-01-12

## 2021-01-12 NOTE — TELEPHONE ENCOUNTER
Rinku Webster from [facility] called([phone]). She needs an order for cpap mask faxed to [facility] and to her. Ha Cavazos fax [phone] and to Rinku Webster fax:[phone]. normal...

## 2021-01-15 DIAGNOSIS — G47.33 OSA ON CPAP: Primary | ICD-10-CM

## 2021-01-15 DIAGNOSIS — Z99.89 OSA ON CPAP: Primary | ICD-10-CM

## 2021-01-15 NOTE — PROGRESS NOTES
Order placed for PAP Supplies, per Verbal Order from Dr. Jade Marrero on 1/15/2021. Last office visit: July 2020  Follow up Visit: Due July 2021    Provider is aware of last office visit and follow up. No further action requested from provider.

## 2021-01-18 RX ORDER — OMEPRAZOLE 40 MG/1
CAPSULE, DELAYED RELEASE ORAL
Qty: 90 CAP | Refills: 3 | Status: SHIPPED | OUTPATIENT
Start: 2021-01-18 | End: 2021-12-07

## 2021-01-20 ENCOUNTER — OFFICE VISIT (OUTPATIENT)
Dept: FAMILY MEDICINE CLINIC | Age: 56
End: 2021-01-20
Payer: COMMERCIAL

## 2021-01-20 DIAGNOSIS — R73.03 PREDIABETES: ICD-10-CM

## 2021-01-20 DIAGNOSIS — E78.5 HYPERLIPIDEMIA, UNSPECIFIED HYPERLIPIDEMIA TYPE: ICD-10-CM

## 2021-01-20 DIAGNOSIS — R51.9 CHRONIC DAILY HEADACHE: ICD-10-CM

## 2021-01-20 DIAGNOSIS — Z99.89 OSA ON CPAP: ICD-10-CM

## 2021-01-20 DIAGNOSIS — N40.0 BENIGN PROSTATIC HYPERPLASIA, UNSPECIFIED WHETHER LOWER URINARY TRACT SYMPTOMS PRESENT: ICD-10-CM

## 2021-01-20 DIAGNOSIS — G47.33 OSA ON CPAP: ICD-10-CM

## 2021-01-20 DIAGNOSIS — I10 ESSENTIAL HYPERTENSION WITH GOAL BLOOD PRESSURE LESS THAN 140/90: Primary | ICD-10-CM

## 2021-01-20 DIAGNOSIS — K21.9 GASTROESOPHAGEAL REFLUX DISEASE WITHOUT ESOPHAGITIS: ICD-10-CM

## 2021-01-20 DIAGNOSIS — F32.4 MAJOR DEPRESSIVE DISORDER WITH SINGLE EPISODE, IN PARTIAL REMISSION (HCC): ICD-10-CM

## 2021-01-20 DIAGNOSIS — E66.9 OBESITY WITH SERIOUS COMORBIDITY, UNSPECIFIED CLASSIFICATION, UNSPECIFIED OBESITY TYPE: ICD-10-CM

## 2021-01-20 PROCEDURE — 99214 OFFICE O/P EST MOD 30 MIN: CPT | Performed by: FAMILY MEDICINE

## 2021-01-20 RX ORDER — ASPIRIN 81 MG/1
81 TABLET ORAL DAILY
COMMUNITY

## 2021-01-20 NOTE — PROGRESS NOTES
1. Have you been to the ER, urgent care clinic since your last visit? Hospitalized since your last visit? No    2. Have you seen or consulted any other health care providers outside of the [address] [place] [address] since your last visit? Include any pap smears or colon screening.  No

## 2021-01-20 NOTE — PATIENT INSTRUCTIONS
A Healthy Lifestyle: Care Instructions Your Care Instructions A healthy lifestyle can help you feel good, stay at a healthy weight, and have plenty of energy for both work and play. A healthy lifestyle is something you can share with your whole family. A healthy lifestyle also can lower your risk for serious health problems, such as high blood pressure, heart disease, and diabetes. You can follow a few steps listed below to improve your health and the health of your family. Follow-up care is a key part of your treatment and safety. Be sure to make and go to all appointments, and call your doctor if you are having problems. It's also a good idea to know your test results and keep a list of the medicines you take. How can you care for yourself at home? · Do not eat too much sugar, fat, or fast foods. You can still have dessert and treats now and then. The goal is moderation. · Start small to improve your eating habits. Pay attention to portion sizes, drink less juice and soda pop, and eat more fruits and vegetables. ? Eat a healthy amount of food. A 3-ounce serving of meat, for example, is about the size of a deck of cards. Fill the rest of your plate with vegetables and whole grains. ? Limit the amount of soda and sports drinks you have every day. Drink more water when you are thirsty. ? Eat at least 5 servings of fruits and vegetables every day. It may seem like a lot, but it is not hard to reach this goal. A serving or helping is 1 piece of fruit, 1 cup of vegetables, or 2 cups of leafy, raw vegetables. Have an apple or some carrot sticks as an afternoon snack instead of a candy bar.  Try to have fruits and/or vegetables at every meal. 
 · Make exercise part of your daily routine. You may want to start with simple activities, such as walking, bicycling, or slow swimming. Try to be active 30 to 60 minutes every day. You do not need to do all 30 to 60 minutes all at once. For example, you can exercise 3 times a day for 10 or 20 minutes. Moderate exercise is safe for most people, but it is always a good idea to talk to your doctor before starting an exercise program. 
· Keep moving. Nicolas Lotus the lawn, work in the garden, or [employer] Take the stairs instead of the elevator at work. · If you smoke, quit. People who smoke have an increased risk for heart attack, stroke, cancer, and other lung illnesses. Quitting is hard, but there are ways to boost your chance of quitting tobacco for good. ? Use nicotine gum, patches, or lozenges. ? Ask your doctor about stop-smoking programs and medicines. ? Keep trying. In addition to reducing your risk of diseases in the future, you will notice some benefits soon after you stop using tobacco. If you have shortness of breath or asthma symptoms, they will likely get better within a few weeks after you quit. · Limit how much alcohol you drink. Moderate amounts of alcohol (up to 2 drinks a day for men, 1 drink a day for women) are okay. But drinking too much can lead to liver problems, high blood pressure, and other health problems. Family health If you have a family, there are many things you can do together to improve your health. · Eat meals together as a family as often as possible. · Eat healthy foods. This includes fruits, vegetables, lean meats and dairy, and whole grains. · Include your family in your fitness plan. Most people think of activities such as jogging or tennis as the way to fitness, but there are many ways you and your family can be more active. Anything that makes you breathe hard and gets your heart pumping is exercise. Here are some tips: ? Walk to do errands or to take your child to school or the bus. 
? Go for a family bike ride after dinner instead of watching TV. Where can you learn more? Go to http://www.gray.com/ Enter N840 in the search box to learn more about \"A Healthy Lifestyle: Care Instructions. \" Current as of: January 31, 2020               Content Version: 12.6 © 2006-2020 [employer]. Care instructions adapted under license by [employer] (which disclaims liability or warranty for this information). If you have questions about a medical condition or this instruction, always ask your healthcare professional. [address] any warranty or liability for your use of this information.

## 2021-01-20 NOTE — PROGRESS NOTES
Susy Galeano, who was evaluated through a synchronous (real-time) audio-video encounter, and/or his healthcare decision maker, is aware that it is a billable service, with coverage as determined by his insurance carrier. He provided verbal consent to proceed: Yes, and patient identification was verified. It was conducted pursuant to the emergency declaration under the [facility], [address] [place] [address] and the [employer] General Act. A caregiver was present when appropriate. Ability to conduct physical exam was limited. I was in the office. The patient was at home. SUBJECTIVE  Chief Complaint   Patient presents with    Hypertension    GERD    Cholesterol Problem    Benign Prostatic Hypertrophy     Patient presents for follow-up. Overall doing well. So far pleased with knee but adjusting to the fact it is larger. He has metabolic disease in the way of hypertension, prediabetes, hyperlipidemia in the setting of obesity. He reports compliance with his current medications which we reviewed. He has no side effects on the medications. He admits he could do better with diet and exercise. He has depression that is well controlled on Zoloft. He has no side effects. No harmful ideations outwardly expressed. He has GERD controlled on PPIs. He has had colorectal cancer screening in 2016. He has sleep apnea and chronic daily headaches controlled on Pamelor. He is on CPAP. He has BPH and a family history of prostate CA. He has had kidney stones as well. He sees a urologist.     OBJECTIVE    General:  Alert, cooperative, well appearing, in no apparent distress. Psych: normal affect. Mood good. Oriented x 3. Judgement and insight intact. ASSESSMENT / PLAN    ICD-10-CM ICD-9-CM    1.  Essential hypertension with goal blood pressure less than 140/90  I10 401.9 CBC WITH AUTOMATED DIFF      METABOLIC PANEL, COMPREHENSIVE      LIPID PANEL   2. Prediabetes  R73.03 790.29 HEMOGLOBIN A1C WITH EAG   3. Obesity with serious comorbidity, unspecified classification, unspecified obesity type  E66.9 278.00    4. Hyperlipidemia, unspecified hyperlipidemia type  M44.0 008.1 METABOLIC PANEL, COMPREHENSIVE      LIPID PANEL   5. Chronic daily headache  R51.9 784.0    6. Gastroesophageal reflux disease without esophagitis  K21.9 530.81    7. Major depressive disorder with single episode, in partial remission ([facility]  F32.4 296.25    8. DB on CPAP  G47.33 327.23     Z99.89 V46.8    9. Benign prostatic hyperplasia, unspecified whether lower urinary tract symptoms present  N40.0 600.00      HTN / prediabetes / hyperlipidemia / obesity - cont current care. Full set of fasting labs due in July. Diet and exercise. Chronic daily HAs / DB - has seen the sleep specialist.  HAs controlled. Refills as needed. GERD - cont PPI as needed. Refills sent earlier this month. Depression - controlled on Zoloft 100mg daily. Refills as needed. BPH / kidney stones - cont per urology. All chart history elements were reviewed by me at the time of the visit even though marked at time of note closure. Patient understands our medical plan. Patient has provided input and agrees with goals. Alternatives have been explained and offered. All questions answered. The patient is to call if condition worsens or fails to improve. RTC in 6 months, fasting labs prior.

## 2021-01-25 ENCOUNTER — OFFICE VISIT (OUTPATIENT)
Dept: CARDIOLOGY CLINIC | Age: 56
End: 2021-01-25
Payer: COMMERCIAL

## 2021-01-25 VITALS
WEIGHT: 238 LBS | BODY MASS INDEX: 35.25 KG/M2 | HEIGHT: 69 IN | TEMPERATURE: 98.4 F | DIASTOLIC BLOOD PRESSURE: 92 MMHG | SYSTOLIC BLOOD PRESSURE: 145 MMHG | HEART RATE: 87 BPM

## 2021-01-25 DIAGNOSIS — I10 ESSENTIAL HYPERTENSION WITH GOAL BLOOD PRESSURE LESS THAN 140/90: ICD-10-CM

## 2021-01-25 DIAGNOSIS — G47.33 OSA ON CPAP: ICD-10-CM

## 2021-01-25 DIAGNOSIS — Z99.89 OSA ON CPAP: ICD-10-CM

## 2021-01-25 DIAGNOSIS — R55 VASOVAGAL SYNCOPE: ICD-10-CM

## 2021-01-25 DIAGNOSIS — I25.111 CORONARY ARTERY DISEASE INVOLVING NATIVE CORONARY ARTERY OF NATIVE HEART WITH ANGINA PECTORIS WITH DOCUMENTED SPASM (HCC): Primary | ICD-10-CM

## 2021-01-25 DIAGNOSIS — E78.5 HYPERLIPIDEMIA, UNSPECIFIED HYPERLIPIDEMIA TYPE: ICD-10-CM

## 2021-01-25 PROCEDURE — 99214 OFFICE O/P EST MOD 30 MIN: CPT | Performed by: INTERNAL MEDICINE

## 2021-01-25 RX ORDER — AMLODIPINE BESYLATE 10 MG/1
10 TABLET ORAL DAILY
Qty: 90 TAB | Refills: 3 | Status: SHIPPED | OUTPATIENT
Start: 2021-01-25 | End: 2021-05-18 | Stop reason: ALTCHOICE

## 2021-01-25 NOTE — PROGRESS NOTES
HISTORY OF PRESENT ILLNESS  Dontrell Manrique is a 60 y.o. male. Patient with cad,coronary spasm,htn  On follow up patient denies any sob, palpitation or other significant symptoms. 2/2020  Seen for follow-up after recent admission. Episode of syncope started while he was standing in line for longer time started with dizziness subsequent syncope. Occasional lightheaded when standing in 1 position. He had positive tilt table in 2013. No recent syncopal episodes. 1/2021  Patient seen today for follow-up. Complains of chest tightness on the left side. Under significant stress at present. Also feels malaise and fatigue. Hypertension  The history is provided by the patient. This is a chronic problem. The problem occurs constantly. The problem has not changed since onset. Associated symptoms include chest pain. Cholesterol Problem  The history is provided by the patient. This is a chronic problem. The problem occurs constantly. The problem has not changed since onset. Associated symptoms include chest pain. Chest Pain (Angina)   The history is provided by the patient. This is a recurrent problem. The problem has been gradually worsening. The problem occurs every several days. The pain is associated with exertion and rest. The pain is present in the substernal region. The pain is mild. The quality of the pain is described as pressure-like. The pain does not radiate. Pertinent negatives include no claudication, no cough, no dizziness, no fever, no hemoptysis, no nausea, no orthopnea, no palpitations, no PND, no sputum production, no vomiting and no weakness. He has tried nitroglycerin for the symptoms. Review of Systems   Constitutional: Negative for chills and fever. HENT: Negative for nosebleeds. Eyes: Negative for blurred vision and double vision. Respiratory: Negative for cough, hemoptysis, sputum production and wheezing. Cardiovascular: Positive for chest pain.  Negative for palpitations, orthopnea, claudication, leg swelling and PND. Gastrointestinal: Negative for heartburn, nausea and vomiting. Musculoskeletal: Negative for myalgias. Skin: Negative for rash. Neurological: Negative for dizziness and weakness. Endo/Heme/Allergies: Does not bruise/bleed easily.      Family History   Problem Relation Age of Onset    Hypertension Mother     Diabetes Mother     Hypertension Father     Cancer Father         Prostate    Heart Disease Father     Heart Attack Father     Stroke Brother     Migraines Brother     Cancer Sister         Breast    Migraines Sister     Stroke Brother        Past Medical History:   Diagnosis Date    Abnormal tilt table test 2013    Back pain     CAD (coronary artery disease)     Coronary artery spasm ([address]     GERD (gastroesophageal reflux disease)     Hearing loss 2021    has an audiologist, has hearing aids    Hypertension     Kidney stone     Migraine     migraines    Prediabetes     Sleep apnea     uses cpap    Syncope     has had recurrent issues, has had work-up to include EEG, monitor, tilt table, Carotids, ECHO       Past Surgical History:   Procedure Laterality Date    COLONOSCOPY N/A 9/30/2016    COLONOSCOPY with biopsy performed by Kathrin Mcgraw MD at [facility] ENDOSCOPY    HX BACK SURGERY      x3    HX ENDOSCOPY      reportedly normal by patient    HX HEENT      sinus sx    HX KNEE REPLACEMENT Right 08/13/2020    Dr Sherwood Median      left shoulder, right knee, left thumb, left great toe sx    HX UROLOGICAL      Lithotripsy    [place] CARDIAC SURG PROCEDURE UNLIST      cardiac catheterization times 5       Social History     Tobacco Use    Smoking status: Never Smoker    Smokeless tobacco: Never Used   Substance Use Topics    Alcohol use: Yes     Frequency: Monthly or less     Drinks per session: 1 or 2     Binge frequency: Never     Comment: rare use       No Known Allergies        Visit Vitals  BP (!) 145/92   Pulse 87   Temp 98.4 °F (36.9 °C) (Temporal)   Ht 5' 9\" (1.753 m)   Wt 108 kg (238 lb)   BMI 35.15 kg/m²         Physical Exam   Constitutional: He is oriented to person, place, and time. He appears well-developed and well-nourished. HENT:   Head: Normocephalic and atraumatic. Eyes: Conjunctivae are normal.   Neck: Neck supple. No JVD present. No tracheal deviation present. No thyromegaly present. Cardiovascular: Normal rate, regular rhythm and normal heart sounds. Exam reveals no gallop and no friction rub. No murmur heard. Pulmonary/Chest: Breath sounds normal. No respiratory distress. He has no wheezes. He has no rales. He exhibits no tenderness. Abdominal: Soft. There is no abdominal tenderness. Musculoskeletal:         General: No edema. Neurological: He is alert and oriented to person, place, and time. Skin: Skin is warm and dry. Psychiatric: He has a normal mood and affect. Mr. Eriberto Alarcon has a reminder for a \"due or due soon\" health maintenance. I have asked that he contact his primary care provider for follow-up on this health maintenance. Cath-2004  rca spasm-  lcx 30-40%  Stress test-2015  Fixed ant defect  Echo-2015  Normal lvef  ekg-7/2016-  wnl  I Have personally reviewed recent relevant labs available and discussed with patient  5/2019BMP, LFT, lipids. MSF56    Echo Cardiogram Complete2/3/2020  [address]  Component Name Value Ref Range   EF Echo 60     Result Impression   :   NORMAL LEFT VENTRICULAR CAVITY SIZE AND SYSTOLIC FUNCTION WITH AN EJECTION FRACTION OF  60 %. ABNORMAL DIASTOLIC FILLING PATTERN. MITRAL ANNULAR CALCIFICATION WITH MILD MITRAL REGURGITATION. SCLEROTIC TRILEAFLET AORTIC VALVE WITHOUT EVIDENCE OF STENOSIS. TRACE OF TRICUSPID REGURGITATION WITH A RVSP OF 38 MMHG. STRUCTURALLY NORMAL PULMONIC VALVE WITH TRACE PULMONIC REGURGITATION. NO EVIDENCE OF PERICARDIAL EFFUSION. NO MASSES, SHUNTS OR THROMBI SEEN.    NO PREVIOUS REPORT FOR COMPARISON. 2/2020 carotid artery PVL  Conclusions: Normal extracranial carotid and vertebral duplex examination. Assessment         ICD-10-CM ICD-9-CM    1. Coronary artery disease involving native coronary artery of native heart with angina pectoris with documented spasm (HCC)  I25.111 414.01 TSH 3RD GENERATION     413.9 T4, FREE      METABOLIC PANEL, BASIC      LIPID PANEL      HEPATIC FUNCTION PANEL    Recently has malaise with increased chest tightness. Medication adjusted   2. Essential hypertension with goal blood pressure less than 140/90  I10 401.9     Mildly elevated increase amlodipine   3. Hyperlipidemia, unspecified hyperlipidemia type  E78.5 272.4 TSH 3RD GENERATION      T4, FREE      METABOLIC PANEL, BASIC      LIPID PANEL      HEPATIC FUNCTION PANEL    Continue treatment lab with PCP   4. Vasovagal syncope  R55 780.2     Stable no recurrence   5. DB on CPAP  G47.33 327.23     Z99.89 V46.8     Continue treatment monitor   Had tried cardizem in past -had drop in bp  No acei/normal lvef  2/2019  Angina significantly improved on addition of Imdur. Blood pressure controlled continue medical management  In 2019  Cardiac status stable. Angina stable. He has orthostatic dizziness. Likely from neuropathy. Will use support stocking. Consider additional medication if required if symptoms are persistent  2/2020  Recent syncope clinically vasovagal.  History of positive tilt table in 2013. Will discontinue amlodipine and monitor as blood pressure on low normal side. Monitor for angina and spasm. Continue with support stocking. Pressure maneuvers and if needed midodrine  7/2020  Cardiac status stable. Okay to proceed with knee surgery with medium cardiac risk. No recurrence of syncope or angina  1/2021  Recent increase in chest tightness. Currently takes Imdur 60 mg increased amlodipine to 10 mg a day. Monitor blood pressure.   Ordered lab including TSH LFT and lipids and BMP      Medications Discontinued During This Encounter   Medication Reason    amLODIPine (NORVASC) 5 mg tablet        Orders Placed This Encounter    TSH 3RD GENERATION     Standing Status:   Future     Standing Expiration Date:   2/24/2021    T4, FREE     Standing Status:   Future     Standing Expiration Date:   8/63/9517    METABOLIC PANEL, BASIC     Standing Status:   Future     Standing Expiration Date:   2/24/2021    LIPID PANEL     Standing Status:   Future     Standing Expiration Date:   7/26/2021    HEPATIC FUNCTION PANEL     Standing Status:   Future     Standing Expiration Date:   7/26/2021    amLODIPine (NORVASC) 10 mg tablet     Sig: Take 1 Tab by mouth daily. Dispense:  90 Tab     Refill:  3       Follow-up and Dispositions    · Return in about 3 months (around 4/25/2021).

## 2021-01-25 NOTE — PROGRESS NOTES
1. Have you been to the ER, urgent care clinic since your last visit? Hospitalized since your last visit?     no  2. Have you seen or consulted any other health care providers outside of the [address] [place] [address] since your last visit? Include any pap smears or colon screening. No     3. Since your last visit, have you had any of the following symptoms? chest pains.

## 2021-02-01 DIAGNOSIS — E78.5 HYPERLIPIDEMIA, UNSPECIFIED HYPERLIPIDEMIA TYPE: ICD-10-CM

## 2021-02-02 RX ORDER — ROSUVASTATIN CALCIUM 10 MG/1
TABLET, COATED ORAL
Qty: 90 TAB | Refills: 1 | Status: SHIPPED | OUTPATIENT
Start: 2021-02-02 | End: 2021-06-18

## 2021-02-03 ENCOUNTER — TELEPHONE (OUTPATIENT)
Dept: PULMONOLOGY | Age: 56
End: 2021-02-03

## 2021-02-03 NOTE — TELEPHONE ENCOUNTER
Arnel Roca from [facility] called requesting and order for CPAP supplies for pt.  Please fax order to [phone]

## 2021-02-04 NOTE — TELEPHONE ENCOUNTER
Order faxed to Jumana Rios at [facility], and order faxed to [address] at [phone], as requestd by Jumana Rios.

## 2021-02-17 DIAGNOSIS — F32.4 MAJOR DEPRESSIVE DISORDER WITH SINGLE EPISODE, IN PARTIAL REMISSION (HCC): ICD-10-CM

## 2021-02-18 ENCOUNTER — HOSPITAL ENCOUNTER (OUTPATIENT)
Dept: LAB | Age: 56
Discharge: HOME | End: 2021-02-18

## 2021-02-18 PROCEDURE — 99001 SPECIMEN HANDLING PT-LAB: CPT

## 2021-02-18 RX ORDER — SERTRALINE HYDROCHLORIDE 100 MG/1
TABLET, FILM COATED ORAL
Qty: 90 TAB | Refills: 1 | Status: SHIPPED | OUTPATIENT
Start: 2021-02-18 | End: 2021-06-18

## 2021-02-19 ENCOUNTER — TELEPHONE (OUTPATIENT)
Dept: CARDIOLOGY CLINIC | Age: 56
End: 2021-02-19

## 2021-02-19 LAB
ALBUMIN SERPL-MCNC: 4.6 G/DL (ref 3.8–4.9)
ALP SERPL-CCNC: 114 IU/L (ref 39–117)
ALT SERPL-CCNC: 44 IU/L (ref 0–44)
AST SERPL-CCNC: 29 IU/L (ref 0–40)
BILIRUB DIRECT SERPL-MCNC: 0.08 MG/DL (ref 0–0.4)
BILIRUB SERPL-MCNC: 0.3 MG/DL (ref 0–1.2)
BUN SERPL-MCNC: 12 MG/DL (ref 6–24)
BUN/CREAT SERPL: 12 (ref 9–20)
CALCIUM SERPL-MCNC: 9.5 MG/DL (ref 8.7–10.2)
CHLORIDE SERPL-SCNC: 106 MMOL/L (ref 96–106)
CHOLEST SERPL-MCNC: 143 MG/DL (ref 100–199)
CO2 SERPL-SCNC: 23 MMOL/L (ref 20–29)
CREAT SERPL-MCNC: 1.04 MG/DL (ref 0.76–1.27)
GLUCOSE SERPL-MCNC: 101 MG/DL (ref 65–99)
HDLC SERPL-MCNC: 42 MG/DL
LDLC SERPL CALC-MCNC: 85 MG/DL (ref 0–99)
POTASSIUM SERPL-SCNC: 4.7 MMOL/L (ref 3.5–5.2)
PROT SERPL-MCNC: 7.5 G/DL (ref 6–8.5)
SODIUM SERPL-SCNC: 142 MMOL/L (ref 134–144)
SPECIMEN STATUS REPORT, ROLRST: NORMAL
T4 FREE SERPL-MCNC: 1.08 NG/DL (ref 0.82–1.77)
TRIGL SERPL-MCNC: 83 MG/DL (ref 0–149)
TSH SERPL DL<=0.005 MIU/L-ACNC: 2.68 UIU/ML (ref 0.45–4.5)
VLDLC SERPL CALC-MCNC: 16 MG/DL (ref 5–40)
XX-LABCORP SPECIMEN COL,LCBCF: NORMAL

## 2021-02-19 NOTE — TELEPHONE ENCOUNTER
----- Message from Gil Chavez MD sent at 2/19/2021  1:04 PM EST -----  No significant abnormality on the lab result

## 2021-02-19 NOTE — TELEPHONE ENCOUNTER
----- Message from Jacquelin Virgen MD sent at 2/19/2021  1:04 PM EST -----  No significant abnormality on the lab result

## 2021-02-19 NOTE — TELEPHONE ENCOUNTER
Called and spoke to patient regarding lab/test per Dr. Leandro Richardson, lab reviewed no significant abnormality on the lab result. He voices understanding and acceptance of this advice and will call back if any further questions or concerns.

## 2021-03-07 ENCOUNTER — HOSPITAL ENCOUNTER (EMERGENCY)
Age: 56
Discharge: HOME | End: 2021-03-07
Attending: EMERGENCY MEDICINE
Payer: COMMERCIAL

## 2021-03-07 VITALS
OXYGEN SATURATION: 96 % | HEIGHT: 69 IN | DIASTOLIC BLOOD PRESSURE: 84 MMHG | TEMPERATURE: 98.6 F | HEART RATE: 104 BPM | BODY MASS INDEX: 34.96 KG/M2 | RESPIRATION RATE: 18 BRPM | SYSTOLIC BLOOD PRESSURE: 121 MMHG | WEIGHT: 236 LBS

## 2021-03-07 DIAGNOSIS — M54.50 CHRONIC LEFT-SIDED LOW BACK PAIN WITHOUT SCIATICA: Primary | ICD-10-CM

## 2021-03-07 DIAGNOSIS — G89.29 CHRONIC LEFT-SIDED LOW BACK PAIN WITHOUT SCIATICA: Primary | ICD-10-CM

## 2021-03-07 PROCEDURE — 74011250637 HC RX REV CODE- 250/637: Performed by: NURSE PRACTITIONER

## 2021-03-07 PROCEDURE — 99284 EMERGENCY DEPT VISIT MOD MDM: CPT

## 2021-03-07 PROCEDURE — 74011000250 HC RX REV CODE- 250: Performed by: NURSE PRACTITIONER

## 2021-03-07 RX ORDER — LIDOCAINE 4 G/100G
PATCH TOPICAL
Qty: 10 PATCH | Refills: 0 | Status: SHIPPED | OUTPATIENT
Start: 2021-03-07 | End: 2021-12-02

## 2021-03-07 RX ORDER — LIDOCAINE 4 G/100G
1 PATCH TOPICAL EVERY 24 HOURS
Status: DISCONTINUED | OUTPATIENT
Start: 2021-03-07 | End: 2021-03-08 | Stop reason: HOSPADM

## 2021-03-07 RX ORDER — PREDNISONE 10 MG/1
TABLET ORAL SEE ADMIN INSTRUCTIONS
COMMUNITY
End: 2021-05-18 | Stop reason: ALTCHOICE

## 2021-03-07 RX ORDER — NALOXONE HYDROCHLORIDE 4 MG/.1ML
SPRAY NASAL
Qty: 1 EACH | Refills: 0 | Status: SHIPPED | OUTPATIENT
Start: 2021-03-07 | End: 2022-09-20

## 2021-03-07 RX ORDER — DIAZEPAM 5 MG/1
10 TABLET ORAL
Status: COMPLETED | OUTPATIENT
Start: 2021-03-07 | End: 2021-03-07

## 2021-03-07 RX ORDER — DIAZEPAM 5 MG/1
10 TABLET ORAL
Qty: 20 TAB | Refills: 0 | Status: SHIPPED | OUTPATIENT
Start: 2021-03-07 | End: 2022-03-21

## 2021-03-07 RX ADMIN — DIAZEPAM 10 MG: 5 TABLET ORAL at 19:14

## 2021-03-07 NOTE — ED TRIAGE NOTES
Patient presents with c/o left lower back pain. States being evaluated by his orthopedic physician on Friday and was prescribed Tramadol and prednisone. He denies relief from medications prescribed. States having Xray completed during ortho visit. States being advised of arthritis to spine dx. Patient transported to ER via EMS with c/o back pain. Patient received 50 mg Fentanyl IV during transport.   Patient rates back pain at 12/10

## 2021-03-07 NOTE — ED TRIAGE NOTES
IV to right AC that was placed by [facility] removed as instructed by Charge Nurse. Patient seated in waiting area awaiting triage.

## 2021-03-08 NOTE — ED PROVIDER NOTES
EMERGENCY DEPARTMENT HISTORY AND PHYSICAL EXAM    7:53 PM  Date: 3/7/2021  Patient Name: Rita Barton    History of Presenting Illness     Chief Complaint   Patient presents with    Back Pain       History Provided By: patient     HPI: Rita Barton is a 60 y.o. male with past medical history of spinal stenosis, disc herniation, back surgeries presents with left lower back pain. Pain is severe worse with movement, no radiation, improved when leaning forward. Denies any injury, injections. Denies any urinary or bowel issues. Patient was seen on Friday by spinal surgery was placed on tramadol and steroids.           PCP: Arron Iqbal MD    Past History     Past Medical History:  Past Medical History:   Diagnosis Date    Abnormal tilt table test 2013    Back pain     Back pain     CAD (coronary artery disease)     Coronary artery spasm (HCC)     GERD (gastroesophageal reflux disease)     Hearing loss 2021    has an audiologist, has hearing aids    Hypertension     Kidney stone     Migraine     migraines    Prediabetes     Sleep apnea     uses cpap    Syncope     has had recurrent issues, has had work-up to include EEG, monitor, tilt table, Carotids, ECHO       Past Surgical History:  Past Surgical History:   Procedure Laterality Date    COLONOSCOPY N/A 9/30/2016    COLONOSCOPY with biopsy performed by Godfrey Jones MD at [facility] ENDOSCOPY    HX BACK SURGERY      x3    HX ENDOSCOPY      reportedly normal by patient    HX HEENT      sinus sx    HX KNEE REPLACEMENT Right 08/13/2020    Dr Michelle White      left shoulder, right knee, left thumb, left great toe sx    HX UROLOGICAL      Lithotripsy    [place] CARDIAC SURG PROCEDURE UNLIST      cardiac catheterization times 5       Family History:  Family History   Problem Relation Age of Onset    Hypertension Mother     Diabetes Mother     Hypertension Father     Cancer Father         Prostate    Heart Disease Father     Heart Attack Father     Stroke Brother     Migraines Brother     Cancer Sister         Breast    Migraines Sister     Stroke Brother        Social History:  Social History     Tobacco Use    Smoking status: Never Smoker    Smokeless tobacco: Never Used   Substance Use Topics    Alcohol use: Yes     Frequency: Monthly or less     Drinks per session: 1 or 2     Binge frequency: Never     Comment: rare use    Drug use: No       Allergies:  No Known Allergies    Review of Systems   Review of Systems   Constitutional: Negative for activity change, appetite change and chills. HENT: Negative for congestion, ear discharge, ear pain and sore throat. Eyes: Negative for photophobia and pain. Respiratory: Negative for cough and choking. Cardiovascular: Negative for palpitations and leg swelling. Gastrointestinal: Negative for anal bleeding and rectal pain. Endocrine: Negative for polydipsia and polyuria. Genitourinary: Negative for genital sores and urgency. Musculoskeletal: Positive for back pain. Negative for arthralgias and myalgias. Neurological: Negative for dizziness, seizures and speech difficulty. Psychiatric/Behavioral: Negative for hallucinations, self-injury and suicidal ideas. Physical Exam     Patient Vitals for the past 12 hrs:   Temp Pulse Resp BP SpO2   03/07/21 1752 98.6 °F (37 °C) (!) 109 22 116/89 96 %       Physical Exam  Vitals signs and nursing note reviewed. Constitutional:       Appearance: He is well-developed. HENT:      Head: Normocephalic and atraumatic. Eyes:      General:         Right eye: No discharge. Left eye: No discharge. Neck:      Musculoskeletal: Normal range of motion and neck supple. Cardiovascular:      Rate and Rhythm: Normal rate and regular rhythm. Heart sounds: Normal heart sounds. No murmur. Pulmonary:      Effort: Pulmonary effort is normal. No respiratory distress.       Breath sounds: Normal breath sounds. No stridor. No wheezing or rales. Chest:      Chest wall: No tenderness. Abdominal:      General: Bowel sounds are normal. There is no distension. Palpations: Abdomen is soft. Tenderness: There is no abdominal tenderness. There is no guarding or rebound. Musculoskeletal: Normal range of motion. General: No swelling, tenderness or signs of injury. Comments: Left paravertebral muscle tenderness  No midline tenderness   Skin:     General: Skin is warm and dry. Neurological:      General: No focal deficit present. Mental Status: He is alert and oriented to person, place, and time. Mental status is at baseline. Motor: No weakness. Comments: No weakness, numbness or paresthesia           Diagnostic Study Results     Labs -  No results found for this or any previous visit (from the past 12 hour(s)). Radiologic Studies -   No results found. Medical Decision Making     ED Course: Progress Notes, Reevaluation, and Consults:    7:53 PM Initial assessment performed. The patients presenting problems have been discussed, and they/their family are in agreement with the care plan formulated and outlined with them. I have encouraged them to ask questions as they arise throughout their visit. Provider Notes (Medical Decision Making):   Patient presents with low back pain  Has known history of spinal stenosis  Left paravertebral muscle tenderness no midline tenderness on exam  No neurological deficit on exam  Patient is already taking steroids  Patient given Valium, since suspect paravertebral muscle tenderness secondary to spasm. Better on reassessment. Able to ambulate  Advised to follow-up with spine surgery              Vital Signs-Reviewed the patient's vital signs. Reviewed pt's pulse ox reading.          Records Reviewed: old medical records  -I am the first provider for this patient.  -I reviewed the vital signs, available nursing notes, past medical history, past surgical history, family history and social history. Current Facility-Administered Medications   Medication Dose Route Frequency Provider Last Rate Last Admin    lidocaine 4 % patch 1 Patch  1 Patch TransDERmal Q24H FIONA Castillo   1 Patch at 03/07/21 1914     Current Outpatient Medications   Medication Sig Dispense Refill    predniSONE (STERAPRED DS) 10 mg dose pack Take  by mouth See Admin Instructions. See administration instruction per 10mg dose pack      tramadol HCl (TRAMADOL PO) Take  by mouth.  tamsulosin (FLOMAX) 0.4 mg capsule TAKE 1 CAPSULE BY MOUTH  DAILY AFTER DINNER 90 Cap 3    sertraline (ZOLOFT) 100 mg tablet TAKE 1 TABLET BY MOUTH  DAILY 90 Tab 1    rosuvastatin (CRESTOR) 10 mg tablet TAKE 1 TABLET BY MOUTH AT  NIGHT 90 Tab 1    amLODIPine (NORVASC) 10 mg tablet Take 1 Tab by mouth daily. 90 Tab 3    aspirin delayed-release 81 mg tablet Take 81 mg by mouth daily.  omeprazole (PRILOSEC) 40 mg capsule TAKE 1 CAPSULE BY MOUTH  DAILY 90 Cap 3    nortriptyline (PAMELOR) 25 mg capsule TAKE 1 CAPSULE BY MOUTH AT  NIGHT FOR HEADACHES 90 Cap 1    ascorbic acid, vitamin C, (Vitamin C) 500 mg tablet Take  by mouth.  aspirin delayed-release 325 mg tablet Take 1 Tab by mouth two (2) times a day. 60 Tab 0    ergocalciferol (ERGOCALCIFEROL) 1,250 mcg (50,000 unit) capsule Take 1 Cap by mouth every seven (7) days. 12 Cap 3    isosorbide mononitrate ER (IMDUR) 60 mg CR tablet Take 60 mg by mouth daily.  latanoprost (XALATAN) 0.005 % ophthalmic solution Administer 1 Drop to both eyes daily. 1    multivitamin (ONE A DAY) tablet Take 1 Tab by mouth daily.  cpap machine kit by Does Not Apply route. CPAP at 11 cm with humidifier. Mask: Wisp nasal, large. Length of need 99 months. Replace mask and accessories as needed times 12 months. Download data at 30 days and fax at [phone]. Dx- DB on CPAP, RLS, Obesity (Patient taking differently: by Does Not Apply route. CPAP at 11 cm with humidifier. Mask: Wisp nasal, large. Length of need 99 months. Replace mask and accessories as needed times 12 months. Download data at 30 days and fax at [phone]. Dx- DB on CPAP, RLS, Obesity  [employer]) 1 Kit 0    nitroglycerin (NITROLINGUAL) 400 mcg/spray spray USE 1 SPRAY ONTO OR UNDER  TONGUE EVERY 5 MINUTES AS  NEEDED FOR CHEST PAIN. MAX  OF 3 SPRAYS IN 15 MINUTES. CALL 911 IF PAIN PERSISTS 4.9 g 3        Clinical Impression     Clinical Impression: No diagnosis found. Disposition:      Pt has been reexamined. Patient has no new complaints, changes, or physical findings. Care plan outlined and precautions discussed. Results were reviewed with the patient. All medications were reviewed with the patient; will d/c home with PMD f/u. All of pt's questions and concerns were addressed. Patient was instructed and agrees to follow up with PMD, as well as to return to the ED upon further deterioration. Patient is ready to go home. This note was dictated utilizing voice recognition software which may lead to typographical errors. I apologize in advance if the situation occurs. If questions arise please do not hesitate to contact me or call our department. This note was dictated utilizing voice recognition software which may lead to typographical errors. I apologize in advance if the situation occurs. If questions arise please do not hesitate to contact me or call our department.     Nate Thompson MD  7:53 PM

## 2021-03-08 NOTE — ED NOTES
I have reviewed discharge instructions with the patient. The patient verbalized understanding. Current Discharge Medication List      START taking these medications    Details   diazePAM (Valium) 5 mg tablet Take 2 Tabs by mouth nightly as needed for Anxiety (spasm). Max Daily Amount: 10 mg. Indications: muscle spasm  Qty: 20 Tab, Refills: 0    Associated Diagnoses: Chronic left-sided low back pain without sciatica      naloxone (NARCAN) 4 mg/actuation nasal spray Use 1 spray intranasally, then discard. Repeat with new spray every 2 min as needed for opioid overdose symptoms, alternating nostrils.   Qty: 1 Each, Refills: 0      lidocaine 4 % patch Apply to area of pain one every 12 hours  Qty: 10 Patch, Refills: 0    Comments: Apply to left lower back

## 2021-03-24 ENCOUNTER — TRANSCRIBE ORDER (OUTPATIENT)
Dept: SCHEDULING | Age: 56
End: 2021-03-24

## 2021-03-24 DIAGNOSIS — M47.816 LUMBAR SPONDYLOSIS: Primary | ICD-10-CM

## 2021-05-18 ENCOUNTER — OFFICE VISIT (OUTPATIENT)
Dept: CARDIOLOGY CLINIC | Age: 56
End: 2021-05-18
Payer: COMMERCIAL

## 2021-05-18 VITALS
WEIGHT: 230 LBS | DIASTOLIC BLOOD PRESSURE: 79 MMHG | HEIGHT: 69 IN | SYSTOLIC BLOOD PRESSURE: 117 MMHG | HEART RATE: 94 BPM | BODY MASS INDEX: 34.07 KG/M2

## 2021-05-18 DIAGNOSIS — R55 VASOVAGAL SYNCOPE: ICD-10-CM

## 2021-05-18 DIAGNOSIS — G47.33 OSA ON CPAP: ICD-10-CM

## 2021-05-18 DIAGNOSIS — E78.5 HYPERLIPIDEMIA, UNSPECIFIED HYPERLIPIDEMIA TYPE: ICD-10-CM

## 2021-05-18 DIAGNOSIS — I10 ESSENTIAL HYPERTENSION WITH GOAL BLOOD PRESSURE LESS THAN 140/90: ICD-10-CM

## 2021-05-18 DIAGNOSIS — I25.111 CORONARY ARTERY DISEASE INVOLVING NATIVE CORONARY ARTERY OF NATIVE HEART WITH ANGINA PECTORIS WITH DOCUMENTED SPASM (HCC): Primary | ICD-10-CM

## 2021-05-18 DIAGNOSIS — Z99.89 OSA ON CPAP: ICD-10-CM

## 2021-05-18 PROCEDURE — 99214 OFFICE O/P EST MOD 30 MIN: CPT | Performed by: INTERNAL MEDICINE

## 2021-05-18 RX ORDER — DILTIAZEM HYDROCHLORIDE 240 MG/1
240 CAPSULE, COATED, EXTENDED RELEASE ORAL DAILY
Qty: 90 CAP | Refills: 1 | Status: SHIPPED | OUTPATIENT
Start: 2021-05-18 | End: 2021-06-18 | Stop reason: SDUPTHER

## 2021-05-18 NOTE — PROGRESS NOTES
HISTORY OF PRESENT ILLNESS  Dion Rodriguez is a 60 y.o. male. Patient with cad,coronary spasm,htn  On follow up patient denies any sob, palpitation or other significant symptoms. 2/2020  Seen for follow-up after recent admission. Episode of syncope started while he was standing in line for longer time started with dizziness subsequent syncope. Occasional lightheaded when standing in 1 position. He had positive tilt table in 2013. No recent syncopal episodes. 1/2021  Patient seen today for follow-up. Complains of chest tightness on the left side. Under significant stress at present. Also feels malaise and fatigue. Hypertension  The history is provided by the patient. This is a chronic problem. The problem occurs constantly. The problem has not changed since onset. Associated symptoms include chest pain. Cholesterol Problem  The history is provided by the patient. This is a chronic problem. The problem occurs constantly. The problem has not changed since onset. Associated symptoms include chest pain. Chest Pain (Angina)   The history is provided by the patient. This is a recurrent problem. The problem has been gradually worsening. The problem occurs every several days. The pain is associated with exertion and rest. The pain is present in the substernal region. The pain is mild. The quality of the pain is described as pressure-like. The pain does not radiate. Pertinent negatives include no claudication, no cough, no dizziness, no fever, no hemoptysis, no nausea, no orthopnea, no palpitations, no PND, no sputum production, no vomiting and no weakness. He has tried nitroglycerin for the symptoms. Review of Systems   Constitutional: Negative for chills and fever. HENT: Negative for nosebleeds. Eyes: Negative for blurred vision and double vision. Respiratory: Negative for cough, hemoptysis, sputum production and wheezing. Cardiovascular: Positive for chest pain.  Negative for palpitations, orthopnea, claudication, leg swelling and PND. Gastrointestinal: Negative for heartburn, nausea and vomiting. Musculoskeletal: Negative for myalgias. Skin: Negative for rash. Neurological: Negative for dizziness and weakness. Endo/Heme/Allergies: Does not bruise/bleed easily.      Family History   Problem Relation Age of Onset    Hypertension Mother     Diabetes Mother     Hypertension Father     Cancer Father         Prostate    Heart Disease Father     Heart Attack Father     Stroke Brother     Migraines Brother     Cancer Sister         Breast    Migraines Sister     Stroke Brother        Past Medical History:   Diagnosis Date    Abnormal tilt table test 2013    Back pain     Back pain     CAD (coronary artery disease)     Coronary artery spasm ([address]     GERD (gastroesophageal reflux disease)     Hearing loss 2021    has an audiologist, has hearing aids    Hypertension     Kidney stone     Migraine     migraines    Prediabetes     Sleep apnea     uses cpap    Syncope     has had recurrent issues, has had work-up to include EEG, monitor, tilt table, Carotids, ECHO       Past Surgical History:   Procedure Laterality Date    COLONOSCOPY N/A 9/30/2016    COLONOSCOPY with biopsy performed by Iman Swan MD at [facility] ENDOSCOPY    HX BACK SURGERY      x3    HX ENDOSCOPY      reportedly normal by patient    HX HEENT      sinus sx    HX KNEE REPLACEMENT Right 08/13/2020    Dr Ruelas Sportsman      left shoulder, right knee, left thumb, left great toe sx    HX UROLOGICAL      Lithotripsy    OR CARDIAC SURG PROCEDURE UNLIST      cardiac catheterization times 5       Social History     Tobacco Use    Smoking status: Never Smoker    Smokeless tobacco: Never Used   Substance Use Topics    Alcohol use: Yes     Frequency: Monthly or less     Drinks per session: 1 or 2     Binge frequency: Never     Comment: rare use       No Known Allergies        Visit Vitals  /79   Pulse 94   Ht 5' 9\" (1.753 m)   Wt 104.3 kg (230 lb)   BMI 33.97 kg/m²         Physical Exam   Constitutional: He is oriented to person, place, and time. He appears well-developed and well-nourished. HENT:   Head: Normocephalic and atraumatic. Eyes: Conjunctivae are normal.   Neck: Neck supple. No JVD present. No tracheal deviation present. No thyromegaly present. Cardiovascular: Normal rate, regular rhythm and normal heart sounds. Exam reveals no gallop and no friction rub. No murmur heard. Pulmonary/Chest: Breath sounds normal. No respiratory distress. He has no wheezes. He has no rales. He exhibits no tenderness. Abdominal: Soft. There is no abdominal tenderness. Musculoskeletal:         General: No edema. Neurological: He is alert and oriented to person, place, and time. Skin: Skin is warm and dry. Psychiatric: He has a normal mood and affect. Mr. Oliver Rosa has a reminder for a \"due or due soon\" health maintenance. I have asked that he contact his primary care provider for follow-up on this health maintenance. Cath-2004  rca spasm-  lcx 30-40%  Stress test-2015  Fixed ant defect  Echo-2015  Normal lvef  ekg-7/2016-  wnl  I Have personally reviewed recent relevant labs available and discussed with patient  5/2019BMP, LFT, lipids. CHD75    Echo Cardiogram Complete2/3/2020  [address]  Component Name Value Ref Range   EF Echo 60     Result Impression   :   NORMAL LEFT VENTRICULAR CAVITY SIZE AND SYSTOLIC FUNCTION WITH AN EJECTION FRACTION OF  60 %. ABNORMAL DIASTOLIC FILLING PATTERN. MITRAL ANNULAR CALCIFICATION WITH MILD MITRAL REGURGITATION. SCLEROTIC TRILEAFLET AORTIC VALVE WITHOUT EVIDENCE OF STENOSIS. TRACE OF TRICUSPID REGURGITATION WITH A RVSP OF 38 MMHG. STRUCTURALLY NORMAL PULMONIC VALVE WITH TRACE PULMONIC REGURGITATION. NO EVIDENCE OF PERICARDIAL EFFUSION. NO MASSES, SHUNTS OR THROMBI SEEN.    NO PREVIOUS REPORT FOR COMPARISON. 2/2020 carotid artery PVL  Conclusions: Normal extracranial carotid and vertebral duplex examination. I Have personally reviewed recent relevant labs available and discussed with patient  2/2021    Assessment         ICD-10-CM ICD-9-CM    1. Coronary artery disease involving native coronary artery of native heart with angina pectoris with documented spasm ([facility])  I25.111 414.01      413.9    2. Essential hypertension with goal blood pressure less than 140/90  I10 401.9    3. Hyperlipidemia, unspecified hyperlipidemia type  E78.5 272.4    4. Vasovagal syncope  R55 780.2    5. DB on CPAP  G47.33 327.23     Z99.89 V46.8    Had tried cardizem in past -had drop in bp  No acei/normal lvef  2/2019  Angina significantly improved on addition of Imdur. Blood pressure controlled continue medical management  In 2019  Cardiac status stable. Angina stable. He has orthostatic dizziness. Likely from neuropathy. Will use support stocking. Consider additional medication if required if symptoms are persistent  2/2020  Recent syncope clinically vasovagal.  History of positive tilt table in 2013. Will discontinue amlodipine and monitor as blood pressure on low normal side. Monitor for angina and spasm. Continue with support stocking. Pressure maneuvers and if needed midodrine  7/2020  Cardiac status stable. Okay to proceed with knee surgery with medium cardiac risk. No recurrence of syncope or angina  1/2021  Recent increase in chest tightness. Currently takes Imdur 60 mg increased amlodipine to 10 mg a day. Monitor blood pressure. Ordered lab including TSH LFT and lipids and BMP  5/2021  Episodes of palpitation and tachycardia noted. We will change amlodipine to diltiazem. Continue other treatment and monitor.   Blood pressure starts elevating might need a combination of diltiazem and amlodipine    Medications Discontinued During This Encounter   Medication Reason    predniSONE (STERAPRED DS) 10 mg dose pack Therapy Completed    aspirin delayed-release 325 mg tablet Not A Current Medication    amLODIPine (NORVASC) 10 mg tablet Alternate Therapy       Orders Placed This Encounter    dilTIAZem ER (CARDIZEM CD) 240 mg capsule     Sig: Take 1 Cap by mouth daily. Dispense:  90 Cap     Refill:  1       Follow-up and Dispositions    · Return in about 4 weeks (around 6/15/2021).

## 2021-05-18 NOTE — PROGRESS NOTES
1. Have you been to the ER, urgent care clinic since your last visit? Hospitalized since your last visit? Yes [facility]  2. Have you seen or consulted any other health care providers outside of the [address] [place] [address] since your last visit? Include any pap smears or colon screening. No     3. Since your last visit, have you had any of the following symptoms? chest pains, palpitations, shortness of breath and dizziness.

## 2021-05-31 RX ORDER — ERGOCALCIFEROL 1.25 MG/1
CAPSULE ORAL
Qty: 12 CAPSULE | Refills: 3 | Status: SHIPPED | OUTPATIENT
Start: 2021-05-31 | End: 2021-12-27

## 2021-06-03 NOTE — TELEPHONE ENCOUNTER
Spoke with Elizabeth Aguilar aware medication has been approved and e-scribed to mail order pharmacy.

## 2021-06-18 ENCOUNTER — OFFICE VISIT (OUTPATIENT)
Dept: CARDIOLOGY CLINIC | Age: 56
End: 2021-06-18
Payer: COMMERCIAL

## 2021-06-18 VITALS
HEART RATE: 69 BPM | HEIGHT: 69 IN | SYSTOLIC BLOOD PRESSURE: 129 MMHG | WEIGHT: 230 LBS | BODY MASS INDEX: 34.07 KG/M2 | DIASTOLIC BLOOD PRESSURE: 81 MMHG

## 2021-06-18 DIAGNOSIS — R55 VASOVAGAL SYNCOPE: ICD-10-CM

## 2021-06-18 DIAGNOSIS — E78.5 HYPERLIPIDEMIA, UNSPECIFIED HYPERLIPIDEMIA TYPE: ICD-10-CM

## 2021-06-18 DIAGNOSIS — F32.4 MAJOR DEPRESSIVE DISORDER WITH SINGLE EPISODE, IN PARTIAL REMISSION (HCC): ICD-10-CM

## 2021-06-18 DIAGNOSIS — R51.9 CHRONIC DAILY HEADACHE: ICD-10-CM

## 2021-06-18 DIAGNOSIS — I10 ESSENTIAL HYPERTENSION WITH GOAL BLOOD PRESSURE LESS THAN 140/90: ICD-10-CM

## 2021-06-18 DIAGNOSIS — Z99.89 OSA ON CPAP: ICD-10-CM

## 2021-06-18 DIAGNOSIS — G47.33 OSA ON CPAP: ICD-10-CM

## 2021-06-18 DIAGNOSIS — I25.111 CORONARY ARTERY DISEASE INVOLVING NATIVE CORONARY ARTERY OF NATIVE HEART WITH ANGINA PECTORIS WITH DOCUMENTED SPASM (HCC): Primary | ICD-10-CM

## 2021-06-18 PROCEDURE — 99214 OFFICE O/P EST MOD 30 MIN: CPT | Performed by: INTERNAL MEDICINE

## 2021-06-18 RX ORDER — SERTRALINE HYDROCHLORIDE 100 MG/1
TABLET, FILM COATED ORAL
Qty: 90 TABLET | Refills: 0 | Status: SHIPPED | OUTPATIENT
Start: 2021-06-18 | End: 2021-10-11

## 2021-06-18 RX ORDER — DILTIAZEM HYDROCHLORIDE 240 MG/1
240 CAPSULE, COATED, EXTENDED RELEASE ORAL DAILY
Qty: 90 CAPSULE | Refills: 3 | Status: SHIPPED | OUTPATIENT
Start: 2021-06-18 | End: 2022-04-27

## 2021-06-18 RX ORDER — NORTRIPTYLINE HYDROCHLORIDE 25 MG/1
CAPSULE ORAL
Qty: 90 CAPSULE | Refills: 0 | Status: SHIPPED | OUTPATIENT
Start: 2021-06-18 | End: 2021-09-30

## 2021-06-18 RX ORDER — ROSUVASTATIN CALCIUM 10 MG/1
TABLET, COATED ORAL
Qty: 90 TABLET | Refills: 0 | Status: SHIPPED | OUTPATIENT
Start: 2021-06-18 | End: 2021-09-30

## 2021-06-18 NOTE — PROGRESS NOTES
HISTORY OF PRESENT ILLNESS  Farideh Post is a 60 y.o. male. Patient with cad,coronary spasm,htn  On follow up patient denies any sob, palpitation or other significant symptoms. 2/2020  Seen for follow-up after recent admission. Episode of syncope started while he was standing in line for longer time started with dizziness subsequent syncope. Occasional lightheaded when standing in 1 position. He had positive tilt table in 2013. No recent syncopal episodes. 1/2021  Patient seen today for follow-up. Complains of chest tightness on the left side. Under significant stress at present. Also feels malaise and fatigue. Hypertension  The history is provided by the patient. This is a chronic problem. The problem occurs constantly. The problem has not changed since onset. Associated symptoms include chest pain. Cholesterol Problem  The history is provided by the patient. This is a chronic problem. The problem occurs constantly. The problem has not changed since onset. Associated symptoms include chest pain. Chest Pain (Angina)   The history is provided by the patient. This is a recurrent problem. The problem has been gradually worsening. The problem occurs every several days. The pain is associated with exertion and rest. The pain is present in the substernal region. The pain is mild. The quality of the pain is described as pressure-like. The pain does not radiate. Pertinent negatives include no claudication, no cough, no dizziness, no fever, no hemoptysis, no nausea, no orthopnea, no palpitations, no PND, no sputum production, no vomiting and no weakness. He has tried nitroglycerin for the symptoms. Review of Systems   Constitutional: Negative for chills and fever. HENT: Negative for nosebleeds. Eyes: Negative for blurred vision and double vision. Respiratory: Negative for cough, hemoptysis, sputum production and wheezing. Cardiovascular: Positive for chest pain.  Negative for palpitations, orthopnea, claudication, leg swelling and PND. Gastrointestinal: Negative for heartburn, nausea and vomiting. Musculoskeletal: Negative for myalgias. Skin: Negative for rash. Neurological: Negative for dizziness and weakness. Endo/Heme/Allergies: Does not bruise/bleed easily.      Family History   Problem Relation Age of Onset    Hypertension Mother     Diabetes Mother     Hypertension Father     Cancer Father         Prostate    Heart Disease Father     Heart Attack Father     Stroke Brother     Migraines Brother     Cancer Sister         Breast    Migraines Sister     Stroke Brother        Past Medical History:   Diagnosis Date    Abnormal tilt table test 2013    Back pain     Back pain     CAD (coronary artery disease)     Coronary artery spasm ([address]     GERD (gastroesophageal reflux disease)     Hearing loss 2021    has an audiologist, has hearing aids    Hypertension     Kidney stone     Migraine     migraines    Prediabetes     Sleep apnea     uses cpap    Syncope     has had recurrent issues, has had work-up to include EEG, monitor, tilt table, Carotids, ECHO       Past Surgical History:   Procedure Laterality Date    COLONOSCOPY N/A 9/30/2016    COLONOSCOPY with biopsy performed by Iman Swan MD at [facility] ENDOSCOPY    HX BACK SURGERY      x3    HX ENDOSCOPY      reportedly normal by patient    HX HEENT      sinus sx    HX KNEE REPLACEMENT Right 08/13/2020    Dr Ruelas Sportsman      left shoulder, right knee, left thumb, left great toe sx    HX UROLOGICAL      Lithotripsy    [place] CARDIAC SURG PROCEDURE UNLIST      cardiac catheterization times 5       Social History     Tobacco Use    Smoking status: Never Smoker    Smokeless tobacco: Never Used   Substance Use Topics    Alcohol use: Yes     Comment: rare use       No Known Allergies        Visit Vitals  /81   Pulse 69   Ht 5' 9\" (1.753 m)   Wt 104.3 kg (230 lb)   BMI 33.97 kg/m²         Physical Exam  Constitutional:       Appearance: He is well-developed. HENT:      Head: Normocephalic and atraumatic. Eyes:      Conjunctiva/sclera: Conjunctivae normal.   Neck:      Thyroid: No thyromegaly. Vascular: No JVD. Trachea: No tracheal deviation. Cardiovascular:      Rate and Rhythm: Normal rate and regular rhythm. Heart sounds: Normal heart sounds. No murmur heard. No friction rub. No gallop. Pulmonary:      Effort: No respiratory distress. Breath sounds: Normal breath sounds. No wheezing or rales. Chest:      Chest wall: No tenderness. Abdominal:      Palpations: Abdomen is soft. Tenderness: There is no abdominal tenderness. Musculoskeletal:      Cervical back: Neck supple. Skin:     General: Skin is warm and dry. Neurological:      Mental Status: He is alert and oriented to person, place, and time. Mr. Karley Preston has a reminder for a \"due or due soon\" health maintenance. I have asked that he contact his primary care provider for follow-up on this health maintenance. Cath-2004  rca spasm-  lcx 30-40%  Stress test-2015  Fixed ant defect  Echo-2015  Normal lvef  ekg-7/2016-  wnl  I Have personally reviewed recent relevant labs available and discussed with patient  5/2019BMP, LFT, lipids. PGD37    Echo Cardiogram Complete2/3/2020  [address]  Component Name Value Ref Range   EF Echo 60     Result Impression   :   NORMAL LEFT VENTRICULAR CAVITY SIZE AND SYSTOLIC FUNCTION WITH AN EJECTION FRACTION OF  60 %. ABNORMAL DIASTOLIC FILLING PATTERN. MITRAL ANNULAR CALCIFICATION WITH MILD MITRAL REGURGITATION. SCLEROTIC TRILEAFLET AORTIC VALVE WITHOUT EVIDENCE OF STENOSIS. TRACE OF TRICUSPID REGURGITATION WITH A RVSP OF 38 MMHG. STRUCTURALLY NORMAL PULMONIC VALVE WITH TRACE PULMONIC REGURGITATION. NO EVIDENCE OF PERICARDIAL EFFUSION. NO MASSES, SHUNTS OR THROMBI SEEN. NO PREVIOUS REPORT FOR COMPARISON.      2/2020 carotid artery PVL  Conclusions: Normal extracranial carotid and vertebral duplex examination. I Have personally reviewed recent relevant labs available and discussed with patient  2/2021    Assessment         ICD-10-CM ICD-9-CM    1. Coronary artery disease involving native coronary artery of native heart with angina pectoris with documented spasm ([facility])  I25.111 414.01      413.9     Stable continue current medical management monitor   2. Essential hypertension with goal blood pressure less than 140/90  I10 401.9     Stable continue treatment   3. Hyperlipidemia, unspecified hyperlipidemia type  E78.5 272.4     Continue treatment lab with PCP   4. Vasovagal syncope  R55 780.2     Stable monitor   5. DB on CPAP  G47.33 327.23     Z99.89 V46.8     Continue treatment follow-up with sleep physician   Had tried cardizem in past -had drop in bp  No acei/normal lvef  2/2019  Angina significantly improved on addition of Imdur. Blood pressure controlled continue medical management  In 2019  Cardiac status stable. Angina stable. He has orthostatic dizziness. Likely from neuropathy. Will use support stocking. Consider additional medication if required if symptoms are persistent  2/2020  Recent syncope clinically vasovagal.  History of positive tilt table in 2013. Will discontinue amlodipine and monitor as blood pressure on low normal side. Monitor for angina and spasm. Continue with support stocking. Pressure maneuvers and if needed midodrine  7/2020  Cardiac status stable. Okay to proceed with knee surgery with medium cardiac risk. No recurrence of syncope or angina  1/2021  Recent increase in chest tightness. Currently takes Imdur 60 mg increased amlodipine to 10 mg a day. Monitor blood pressure. Ordered lab including TSH LFT and lipids and BMP  5/2021  Episodes of palpitation and tachycardia noted. We will change amlodipine to diltiazem. Continue other treatment and monitor.   Blood pressure starts elevating might need a combination of diltiazem and amlodipine  6/2021  Cardiac status stable. Blood pressure much better controlled. Heart rate better. Palpitations are better continue treatment  Medications Discontinued During This Encounter   Medication Reason    dilTIAZem ER (CARDIZEM CD) 240 mg capsule REORDER       Orders Placed This Encounter    dilTIAZem ER (CARDIZEM CD) 240 mg capsule     Sig: Take 1 Capsule by mouth daily. Dispense:  90 Capsule     Refill:  3       Follow-up and Dispositions    · Return in about 6 months (around 12/18/2021).

## 2021-07-01 RX ORDER — ISOSORBIDE MONONITRATE 60 MG/1
TABLET, EXTENDED RELEASE ORAL
Qty: 45 TABLET | Refills: 3 | Status: SHIPPED | OUTPATIENT
Start: 2021-07-01 | End: 2021-07-20 | Stop reason: SDUPTHER

## 2021-07-06 ENCOUNTER — HOSPITAL ENCOUNTER (OUTPATIENT)
Dept: LAB | Age: 56
Discharge: HOME | End: 2021-07-06

## 2021-07-06 LAB — XX-LABCORP SPECIMEN COL,LCBCF: NORMAL

## 2021-07-06 PROCEDURE — 99001 SPECIMEN HANDLING PT-LAB: CPT

## 2021-07-08 LAB
ALBUMIN SERPL-MCNC: 4.9 G/DL (ref 3.8–4.9)
ALBUMIN/GLOB SERPL: 1.8 {RATIO} (ref 1.2–2.2)
ALP SERPL-CCNC: 101 IU/L (ref 48–121)
ALT SERPL-CCNC: 32 IU/L (ref 0–44)
AST SERPL-CCNC: 26 IU/L (ref 0–40)
BASOPHILS # BLD AUTO: 0.1 X10E3/UL (ref 0–0.2)
BASOPHILS NFR BLD AUTO: 1 %
BILIRUB SERPL-MCNC: 0.3 MG/DL (ref 0–1.2)
BUN SERPL-MCNC: 14 MG/DL (ref 6–24)
BUN/CREAT SERPL: 15 (ref 9–20)
CALCIUM SERPL-MCNC: 9.6 MG/DL (ref 8.7–10.2)
CHLORIDE SERPL-SCNC: 104 MMOL/L (ref 96–106)
CHOLEST SERPL-MCNC: 154 MG/DL (ref 100–199)
CO2 SERPL-SCNC: 23 MMOL/L (ref 20–29)
CREAT SERPL-MCNC: 0.94 MG/DL (ref 0.76–1.27)
EOSINOPHIL # BLD AUTO: 0.2 X10E3/UL (ref 0–0.4)
EOSINOPHIL NFR BLD AUTO: 3 %
ERYTHROCYTE [DISTWIDTH] IN BLOOD BY AUTOMATED COUNT: 14.1 % (ref 11.6–15.4)
EST. AVERAGE GLUCOSE BLD GHB EST-MCNC: 123 MG/DL
GLOBULIN SER CALC-MCNC: 2.7 G/DL (ref 1.5–4.5)
GLUCOSE SERPL-MCNC: 96 MG/DL (ref 65–99)
HBA1C MFR BLD: 5.9 % (ref 4.8–5.6)
HCT VFR BLD AUTO: 42 % (ref 37.5–51)
HDLC SERPL-MCNC: 37 MG/DL
HGB BLD-MCNC: 14.7 G/DL (ref 13–17.7)
IMM GRANULOCYTES # BLD AUTO: 0 X10E3/UL (ref 0–0.1)
IMM GRANULOCYTES NFR BLD AUTO: 0 %
IMP & REVIEW OF LAB RESULTS: NORMAL
LDLC SERPL CALC-MCNC: 91 MG/DL (ref 0–99)
LYMPHOCYTES # BLD AUTO: 2.5 X10E3/UL (ref 0.7–3.1)
LYMPHOCYTES NFR BLD AUTO: 35 %
MCH RBC QN AUTO: 29.8 PG (ref 26.6–33)
MCHC RBC AUTO-ENTMCNC: 35 G/DL (ref 31.5–35.7)
MCV RBC AUTO: 85 FL (ref 79–97)
MONOCYTES # BLD AUTO: 0.5 X10E3/UL (ref 0.1–0.9)
MONOCYTES NFR BLD AUTO: 7 %
NEUTROPHILS # BLD AUTO: 3.9 X10E3/UL (ref 1.4–7)
NEUTROPHILS NFR BLD AUTO: 54 %
PLATELET # BLD AUTO: 231 X10E3/UL (ref 150–450)
POTASSIUM SERPL-SCNC: 4.3 MMOL/L (ref 3.5–5.2)
PROT SERPL-MCNC: 7.6 G/DL (ref 6–8.5)
RBC # BLD AUTO: 4.93 X10E6/UL (ref 4.14–5.8)
SODIUM SERPL-SCNC: 144 MMOL/L (ref 134–144)
SPECIMEN STATUS REPORT, ROLRST: NORMAL
TRIGL SERPL-MCNC: 148 MG/DL (ref 0–149)
VLDLC SERPL CALC-MCNC: 26 MG/DL (ref 5–40)
WBC # BLD AUTO: 7.2 X10E3/UL (ref 3.4–10.8)

## 2021-07-15 ENCOUNTER — OFFICE VISIT (OUTPATIENT)
Dept: FAMILY MEDICINE CLINIC | Age: 56
End: 2021-07-15
Payer: COMMERCIAL

## 2021-07-15 VITALS
DIASTOLIC BLOOD PRESSURE: 80 MMHG | TEMPERATURE: 98.9 F | SYSTOLIC BLOOD PRESSURE: 132 MMHG | OXYGEN SATURATION: 96 % | WEIGHT: 232 LBS | BODY MASS INDEX: 34.36 KG/M2 | HEART RATE: 73 BPM | HEIGHT: 69 IN | RESPIRATION RATE: 16 BRPM

## 2021-07-15 DIAGNOSIS — E66.9 OBESITY WITH SERIOUS COMORBIDITY, UNSPECIFIED CLASSIFICATION, UNSPECIFIED OBESITY TYPE: ICD-10-CM

## 2021-07-15 DIAGNOSIS — N40.0 BENIGN PROSTATIC HYPERPLASIA, UNSPECIFIED WHETHER LOWER URINARY TRACT SYMPTOMS PRESENT: ICD-10-CM

## 2021-07-15 DIAGNOSIS — K21.9 GASTROESOPHAGEAL REFLUX DISEASE WITHOUT ESOPHAGITIS: ICD-10-CM

## 2021-07-15 DIAGNOSIS — G47.33 OSA ON CPAP: ICD-10-CM

## 2021-07-15 DIAGNOSIS — R73.03 PREDIABETES: ICD-10-CM

## 2021-07-15 DIAGNOSIS — E78.5 HYPERLIPIDEMIA, UNSPECIFIED HYPERLIPIDEMIA TYPE: ICD-10-CM

## 2021-07-15 DIAGNOSIS — R51.9 CHRONIC DAILY HEADACHE: ICD-10-CM

## 2021-07-15 DIAGNOSIS — Z99.89 OSA ON CPAP: ICD-10-CM

## 2021-07-15 DIAGNOSIS — K63.5 POLYP OF COLON, UNSPECIFIED PART OF COLON, UNSPECIFIED TYPE: ICD-10-CM

## 2021-07-15 DIAGNOSIS — N20.0 KIDNEY STONE: ICD-10-CM

## 2021-07-15 DIAGNOSIS — I10 ESSENTIAL HYPERTENSION WITH GOAL BLOOD PRESSURE LESS THAN 140/90: Primary | ICD-10-CM

## 2021-07-15 DIAGNOSIS — F32.4 MAJOR DEPRESSIVE DISORDER WITH SINGLE EPISODE, IN PARTIAL REMISSION (HCC): ICD-10-CM

## 2021-07-15 PROCEDURE — 99214 OFFICE O/P EST MOD 30 MIN: CPT | Performed by: FAMILY MEDICINE

## 2021-07-15 NOTE — PROGRESS NOTES
SUBJECTIVE  Chief Complaint   Patient presents with    Results     Patient presents for follow-up. He has metabolic disease in the way of hypertension, prediabetes, hyperlipidemia in the setting of obesity. He reports compliance with his current medications without side effects. He has depression that is well-controlled on Zoloft. He has no side effects. No harmful ideations outwardly expressed. He has GERD controlled on PPIs. He has had colorectal cancer screening in Sept 2016 and he is due soon. He has sleep apnea and chronic daily headaches controlled on Pamelor. He is on CPAP. He has BPH and a family history of prostate CA. He has had kidney stones as well and sees a urologist.     OBJECTIVE    Blood pressure 132/80, pulse 73, temperature 98.9 °F (37.2 °C), temperature source Temporal, resp. rate 16, height 5' 9\" (1.753 m), weight 232 lb (105.2 kg), SpO2 96 %. General:  Alert, cooperative, well appearing, in no apparent distress. CV:  The heart sounds are regular in rate and rhythm. There is a normal S1 and S2. There or no murmurs  Lungs: Inspiratory and expiratory efforts are full and unlabored. Lung sounds are clear and equal to auscultation throughout all lung fields without wheezing, rales, or rhonchi. Skin:  No rashes, no jaundice. Psych: normal affect. Mood good. Oriented x 3. Judgement and insight intact. Results for Alyse Jimenez (MRN [id number]) as of 7/17/2021 11:57   Ref.  Range 7/6/2021 00:00   WBC Latest Ref Range: 3.4 - 10.8 x10E3/uL 7.2   RBC Latest Ref Range: 4.14 - 5.80 x10E6/uL 4.93   HGB Latest Ref Range: 13.0 - 17.7 g/dL 14.7   HCT Latest Ref Range: 37.5 - 51.0 % 42.0   MCV Latest Ref Range: 79 - 97 fL 85   MCH Latest Ref Range: 26.6 - 33.0 pg 29.8   MCHC Latest Ref Range: 31.5 - 35.7 g/dL 35.0   RDW Latest Ref Range: 11.6 - 15.4 % 14.1   PLATELET Latest Ref Range: 150 - 450 x10E3/uL 231   NEUTROPHILS Latest Ref Range: Not Estab. % 54 Lymphocytes Latest Ref Range: Not Estab. % 35   MONOCYTES Latest Ref Range: Not Estab. % 7   EOSINOPHILS Latest Ref Range: Not Estab. % 3   BASOPHILS Latest Ref Range: Not Estab. % 1   IMMATURE GRANULOCYTES Latest Ref Range: Not Estab. % 0   ABS. NEUTROPHILS Latest Ref Range: 1.4 - 7.0 x10E3/uL 3.9   ABS. IMM. GRANS. Latest Ref Range: 0.0 - 0.1 x10E3/uL 0.0   Abs Lymphocytes Latest Ref Range: 0.7 - 3.1 x10E3/uL 2.5   ABS. MONOCYTES Latest Ref Range: 0.1 - 0.9 x10E3/uL 0.5   ABS. EOSINOPHILS Latest Ref Range: 0.0 - 0.4 x10E3/uL 0.2   ABS. BASOPHILS Latest Ref Range: 0.0 - 0.2 x10E3/uL 0.1   Sodium Latest Ref Range: 134 - 144 mmol/L 144   Potassium Latest Ref Range: 3.5 - 5.2 mmol/L 4.3   Chloride Latest Ref Range: 96 - 106 mmol/L 104   CO2 Latest Ref Range: 20 - 29 mmol/L 23   Glucose Latest Ref Range: 65 - 99 mg/dL 96   BUN Latest Ref Range: 6 - 24 mg/dL 14   Creatinine Latest Ref Range: 0.76 - 1.27 mg/dL 0.94   BUN/Creatinine ratio Latest Ref Range: 9 - 20  15   Calcium Latest Ref Range: 8.7 - 10.2 mg/dL 9.6   GFR est non-AA Latest Ref Range: >59 mL/min/1.73 90   GFR est AA Latest Ref Range: >59 mL/min/1.73 104   Bilirubin, total Latest Ref Range: 0.0 - 1.2 mg/dL 0.3   Protein, total Latest Ref Range: 6.0 - 8.5 g/dL 7.6   Albumin Latest Ref Range: 3.8 - 4.9 g/dL 4.9   A-G Ratio Latest Ref Range: 1.2 - 2.2  1.8   ALT Latest Ref Range: 0 - 44 IU/L 32   AST Latest Ref Range: 0 - 40 IU/L 26   Alk. phosphatase Latest Ref Range: 48 - 121 IU/L 101   Triglyceride Latest Ref Range: 0 - 149 mg/dL 148   Cholesterol, total Latest Ref Range: 100 - 199 mg/dL 154   HDL Cholesterol Latest Ref Range: >39 mg/dL 37 (L)   VLDL, calculated Latest Ref Range: 5 - 40 mg/dL 26   LDL, calculated Latest Ref Range: 0 - 99 mg/dL 91   Hemoglobin A1c, (calculated) Latest Ref Range: 4.8 - 5.6 % 5.9 (H)   Estimated average glucose Latest Units: mg/dL 123     ASSESSMENT / PLAN    ICD-10-CM ICD-9-CM    1.  Essential hypertension with goal blood pressure less than 140/90  I10 401.9    2. Hyperlipidemia, unspecified hyperlipidemia type  E78.5 272.4    3. Prediabetes  R73.03 790.29    4. Obesity with serious comorbidity, unspecified classification, unspecified obesity type  E66.9 278.00    5. Gastroesophageal reflux disease without esophagitis  K21.9 530.81    6. DB on CPAP  G47.33 327.23     Z99.89 V46.8    7. Major depressive disorder with single episode, in partial remission ([facility]  F32.4 296.25    8. Chronic daily headache  R51.9 784.0    9. Benign prostatic hyperplasia, unspecified whether lower urinary tract symptoms present  N40.0 600.00    10. Kidney stone  N20.0 592.0      Reviewed labs. HTN / prediabetes / hyperlipidemia / obesity - cont current care. Diet and exercise. GERD - cont PPI as needed. Refills sent earlier this month. Depression - controlled on Zoloft 100mg daily. Refills as needed. Chronic daily HAs / DB - has seen the sleep specialist.  HAs controlled. Refills as needed. BPH / kidney stones - cont per urology. Referred back to Dr. Kavya Vasquez for CRCS. All chart history elements were reviewed by me at the time of the visit even though marked at time of note closure. Patient understands our medical plan. Patient has provided input and agrees with goals. Alternatives have been explained and offered. All questions answered. The patient is to call if condition worsens or fails to improve. Follow-up and Dispositions    · Return in about 6 months (around 1/15/2022) for routine care. A1c to be done in office.

## 2021-07-15 NOTE — PROGRESS NOTES
1. Have you been to the ER, urgent care clinic since your last visit? Hospitalized since your last visit? Yes, [facility] 3/7/2021    2. Have you seen or consulted any other health care providers outside of the [address] [place] [address] since your last visit? Include any pap smears or colon screening.  No

## 2021-07-15 NOTE — PATIENT INSTRUCTIONS
A Healthy Lifestyle: Care Instructions  Your Care Instructions     A healthy lifestyle can help you feel good, stay at a healthy weight, and have plenty of energy for both work and play. A healthy lifestyle is something you can share with your whole family. A healthy lifestyle also can lower your risk for serious health problems, such as high blood pressure, heart disease, and diabetes. You can follow a few steps listed below to improve your health and the health of your family. Follow-up care is a key part of your treatment and safety. Be sure to make and go to all appointments, and call your doctor if you are having problems. It's also a good idea to know your test results and keep a list of the medicines you take. How can you care for yourself at home? · Do not eat too much sugar, fat, or fast foods. You can still have dessert and treats now and then. The goal is moderation. · Start small to improve your eating habits. Pay attention to portion sizes, drink less juice and soda pop, and eat more fruits and vegetables. ? Eat a healthy amount of food. A 3-ounce serving of meat, for example, is about the size of a deck of cards. Fill the rest of your plate with vegetables and whole grains. ? Limit the amount of soda and sports drinks you have every day. Drink more water when you are thirsty. ? Eat plenty of fruits and vegetables every day. Have an apple or some carrot sticks as an afternoon snack instead of a candy bar. Try to have fruits and/or vegetables at every meal.  · Make exercise part of your daily routine. You may want to start with simple activities, such as walking, bicycling, or slow swimming. Try to be active 30 to 60 minutes every day. You do not need to do all 30 to 60 minutes all at once. For example, you can exercise 3 times a day for 10 or 20 minutes.  Moderate exercise is safe for most people, but it is always a good idea to talk to your doctor before starting an exercise program.  · Keep moving. Ani Bayt the lawn, work in the garden, or [employer]. Take the stairs instead of the elevator at work. · If you smoke, quit. People who smoke have an increased risk for heart attack, stroke, cancer, and other lung illnesses. Quitting is hard, but there are ways to boost your chance of quitting tobacco for good. ? Use nicotine gum, patches, or lozenges. ? Ask your doctor about stop-smoking programs and medicines. ? Keep trying. In addition to reducing your risk of diseases in the future, you will notice some benefits soon after you stop using tobacco. If you have shortness of breath or asthma symptoms, they will likely get better within a few weeks after you quit. · Limit how much alcohol you drink. Moderate amounts of alcohol (up to 2 drinks a day for men, 1 drink a day for women) are okay. But drinking too much can lead to liver problems, high blood pressure, and other health problems. Family health  If you have a family, there are many things you can do together to improve your health. · Eat meals together as a family as often as possible. · Eat healthy foods. This includes fruits, vegetables, lean meats and dairy, and whole grains. · Include your family in your fitness plan. Most people think of activities such as jogging or tennis as the way to fitness, but there are many ways you and your family can be more active. Anything that makes you breathe hard and gets your heart pumping is exercise. Here are some tips:  ? Walk to do errands or to take your child to school or the bus.  ? Go for a family bike ride after dinner instead of watching TV. Where can you learn more? Go to http://www.gray.com/  Enter S461 in the search box to learn more about \"A Healthy Lifestyle: Care Instructions. \"  Current as of: September 23, 2020               Content Version: 12.8  © 7674-5625 [employer].    Care instructions adapted under license by [employer] (which disclaims liability or warranty for this information). If you have questions about a medical condition or this instruction, always ask your healthcare professional. [address] any warranty or liability for your use of this information.

## 2021-07-20 RX ORDER — ISOSORBIDE MONONITRATE 60 MG/1
60 TABLET, EXTENDED RELEASE ORAL
Qty: 30 TABLET | Refills: 6 | Status: SHIPPED | OUTPATIENT
Start: 2021-07-20 | End: 2021-07-23 | Stop reason: SDUPTHER

## 2021-07-23 RX ORDER — ISOSORBIDE MONONITRATE 60 MG/1
60 TABLET, EXTENDED RELEASE ORAL
Qty: 90 TABLET | Refills: 1 | Status: SHIPPED | OUTPATIENT
Start: 2021-07-23 | End: 2021-12-02 | Stop reason: SDUPTHER

## 2021-09-08 ENCOUNTER — CLINICAL SUPPORT (OUTPATIENT)
Dept: SURGERY | Age: 56
End: 2021-09-08

## 2021-09-08 VITALS
TEMPERATURE: 97.7 F | OXYGEN SATURATION: 96 % | HEART RATE: 78 BPM | WEIGHT: 234 LBS | BODY MASS INDEX: 34.66 KG/M2 | RESPIRATION RATE: 18 BRPM | HEIGHT: 69 IN

## 2021-09-08 DIAGNOSIS — Z86.010 HISTORY OF COLON POLYPS: ICD-10-CM

## 2021-09-08 DIAGNOSIS — Z01.818 PRE-OP TESTING: Primary | ICD-10-CM

## 2021-09-08 DIAGNOSIS — Z12.11 COLON CANCER SCREENING: ICD-10-CM

## 2021-09-08 RX ORDER — DICLOFENAC SODIUM 100 MG/1
100 TABLET, FILM COATED, EXTENDED RELEASE ORAL 2 TIMES DAILY
COMMUNITY
End: 2021-12-02

## 2021-09-08 NOTE — PROGRESS NOTES
Review of Systems   Constitutional: Positive for malaise/fatigue. Negative for chills, diaphoresis, fever and weight loss. HENT: Positive for hearing loss and tinnitus. Negative for congestion, ear discharge, ear pain, nosebleeds, sinus pain and sore throat. Hearing aides bilateral   Eyes: Positive for pain. Negative for blurred vision, double vision, photophobia, discharge and redness. Respiratory: Negative. Negative for stridor. Cardiovascular: Positive for chest pain and palpitations. Negative for orthopnea, claudication, leg swelling and PND. Gastrointestinal: Positive for constipation. Negative for abdominal pain, blood in stool, diarrhea, heartburn, melena, nausea and vomiting. Genitourinary: Positive for dysuria. Negative for flank pain, frequency, hematuria and urgency. Musculoskeletal: Positive for back pain and joint pain. Negative for falls, myalgias and neck pain. Skin: Negative. Neurological: Positive for dizziness, tingling, tremors, loss of consciousness and headaches. Negative for sensory change, speech change, focal weakness, seizures and weakness. Feb 2020, syncope   Endo/Heme/Allergies: Negative. Psychiatric/Behavioral: Negative. Colon Screen    Patient: Luis Leonard MRN: [id number]  SSN: [id number]    YOB: 1965  Age: 60 y.o. Sex: male        Subjective:   Luis Leonard was referred by her PCP, Beto Spicer MD.  Patient referred for colonoscopy for   Personal history of colon polyps (screening only). Patient denies rectal pain or bleeding. Abdominal surgeries as described below, specifically none. Family history as described below, specifically sister with colon polyps. Last colonoscopy was 5 years ago with Dr. Ephraim Velasquez.     No Known Allergies    Past Medical History:   Diagnosis Date    Abnormal tilt table test 2013    Back pain     Back pain     CAD (coronary artery disease)     Coronary artery spasm (HCC)     GERD (gastroesophageal reflux disease)     Glaucoma     Hearing loss 2021    has an audiologist, has hearing aids    Hypertension     Kidney stone     Migraine     migraines    Prediabetes     S/P colonoscopic polypectomy 09/2016    Sleep apnea     uses cpap    Syncope     has had recurrent issues, has had work-up to include EEG, monitor, tilt table, Carotids, ECHO     Past Surgical History:   Procedure Laterality Date    COLONOSCOPY N/A 9/30/2016    COLONOSCOPY with biopsy performed by Prem [facility]ett [facility] at [facility] HX BACK SURGERY      x3    HX COLONOSCOPY  09/2016    Dr. Jean Solid      reportedly normal by patient    HX HEENT      sinus sx    HX KNEE REPLACEMENT Right 08/13/2020    Dr Verona Schilder      left shoulder x 3, right knee, left thumb, left great toe sx    HX ORTHOPAEDIC Right     elbow    HX UROLOGICAL      Lithotripsy    MS CARDIAC SURG PROCEDURE UNLIST      cardiac catheterization times 5      Family History   Problem Relation Age of Onset    Hypertension Mother     Diabetes Mother     Hypertension Father     Cancer Father         Prostate    Heart Disease Father     Heart Attack Father     Stroke Brother     Migraines Brother     Cancer Sister         Breast    Colon Polyps Sister     Migraines Sister     Stroke Brother      Social History     Tobacco Use    Smoking status: Never Smoker    Smokeless tobacco: Never Used   Substance Use Topics    Alcohol use: Yes     Comment: rare use      Prior to Admission medications    Medication Sig Start Date End Date Taking? Authorizing Provider   diclofenac (VOLTAREN XR) 100 mg ER tablet Take 100 mg by mouth two (2) times a day. Yes Provider, Historical   isosorbide mononitrate ER (IMDUR) 60 mg CR tablet Take 1 Tablet by mouth every morning. 7/23/21  Yes Taylor Wilkinson NP   dilTIAZem ER (CARDIZEM CD) 240 mg capsule Take 1 Capsule by mouth daily.  6/18/21  Yes Enrique Bedoya MD   sertraline (ZOLOFT) 100 mg tablet TAKE 1 TABLET BY MOUTH  DAILY 6/18/21  Yes Chery Patel MD   nortriptyline (PAMELOR) 25 mg capsule TAKE 1 CAPSULE BY MOUTH AT  NIGHT FOR HEADACHES 6/18/21  Yes Chery Patel MD   rosuvastatin (CRESTOR) 10 mg tablet TAKE 1 TABLET BY MOUTH AT  NIGHT 6/18/21  Yes Chery Patel MD   Vitamin D2 1,250 mcg (50,000 unit) capsule TAKE 1 CAPSULE BY MOUTH  EVERY 7 DAYS 5/31/21  Yes Chery Patel MD   tramadol HCl (TRAMADOL PO) Take  by mouth. Yes Other, MD Lotus   diazePAM (Valium) 5 mg tablet Take 2 Tabs by mouth nightly as needed for Anxiety (spasm). Max Daily Amount: 10 mg. Indications: muscle spasm 3/7/21  Yes Crystal Dey MD   naloxone [facility]) 4 mg/actuation nasal spray Use 1 spray intranasally, then discard. Repeat with new spray every 2 min as needed for opioid overdose symptoms, alternating nostrils. 3/7/21  Yes Crystal Dey MD   lidocaine 4 % patch Apply to area of pain one every 12 hours 3/7/21  Yes Crystal Dey MD   tamsulosin (FLOMAX) 0.4 mg capsule TAKE 1 CAPSULE BY MOUTH  DAILY AFTER DINNER 2/18/21  Yes Deuce Torres MD   aspirin delayed-release 81 mg tablet Take 81 mg by mouth daily. Yes Provider, Historical   omeprazole (PRILOSEC) 40 mg capsule TAKE 1 CAPSULE BY MOUTH  DAILY 1/18/21  Yes Chery Patel MD   nitroglycerin (NITROLINGUAL) 400 mcg/spray spray USE 1 SPRAY ONTO OR UNDER  TONGUE EVERY 5 MINUTES AS  NEEDED FOR CHEST PAIN. MAX  OF 3 SPRAYS IN 15 MINUTES. CALL 911 IF PAIN PERSISTS 12/21/20  Yes Windy Valentin NP   ascorbic acid, vitamin C, (Vitamin C) 500 mg tablet Take  by mouth. Yes Provider, Historical   latanoprost (XALATAN) 0.005 % ophthalmic solution Administer 1 Drop to both eyes daily. 10/24/19  Yes Provider, Historical   multivitamin (ONE A DAY) tablet Take 1 Tab by mouth daily. Yes Provider, Historical   cpap machine kit by Does Not Apply route. CPAP at 11 cm with humidifier. Mask: Wisp nasal, large. Length of need 99 months.  Replace mask and accessories as needed times 12 months. Download data at 30 days and fax at [phone]. Dx- DB on CPAP, RLS, Obesity  Patient taking differently: by Does Not Apply route. CPAP at 11 cm with humidifier. Mask: Wisp nasal, large. Length of need 99 months. Replace mask and accessories as needed times 12 months. Download data at 30 days and fax at [phone]. Dx- DB on CPAP, RLS, Obesity  [facility] 11/22/16  Yes Ya López MD              Risks colonoscopy described- colon injury, missed lesion, anesthesia problems, bleeding       Brooke Kuo, LPN  September 8, 7217  2:38 PM

## 2021-09-23 DIAGNOSIS — E78.5 HYPERLIPIDEMIA, UNSPECIFIED HYPERLIPIDEMIA TYPE: ICD-10-CM

## 2021-09-30 DIAGNOSIS — R51.9 CHRONIC DAILY HEADACHE: ICD-10-CM

## 2021-09-30 RX ORDER — NORTRIPTYLINE HYDROCHLORIDE 25 MG/1
CAPSULE ORAL
Qty: 90 CAPSULE | Refills: 3 | Status: SHIPPED | OUTPATIENT
Start: 2021-09-30 | End: 2022-09-20 | Stop reason: SDUPTHER

## 2021-09-30 RX ORDER — ROSUVASTATIN CALCIUM 10 MG/1
TABLET, COATED ORAL
Qty: 90 TABLET | Refills: 3 | Status: SHIPPED | OUTPATIENT
Start: 2021-09-30 | End: 2022-07-05

## 2021-10-09 DIAGNOSIS — F32.4 MAJOR DEPRESSIVE DISORDER WITH SINGLE EPISODE, IN PARTIAL REMISSION (HCC): ICD-10-CM

## 2021-10-11 RX ORDER — SERTRALINE HYDROCHLORIDE 100 MG/1
TABLET, FILM COATED ORAL
Qty: 90 TABLET | Refills: 3 | Status: SHIPPED | OUTPATIENT
Start: 2021-10-11 | End: 2022-09-16

## 2021-11-23 NOTE — PERIOP NOTES
PAT phone assessment completed on 11/23/2021     The following instructions were reviewed with patient and verbalized understanding. 1. Do NOT eat or drink anything, including candy, gum, or ice chips after midnight on 11/30/2021, unless you have specific instructions from your surgeon or anesthesia provider to do so.  2. You may brush your teeth before coming to the hospital.  3. No smoking 24 hours prior to the day of surgery. 4. No alcohol 24 hours prior to the day of surgery. 5. No recreational drugs for one week prior to the day of surgery. 6. Leave all valuables, including money/purse, at home. 7. Remove all jewelry, nail polish, acrylic nails, and makeup (including mascara); no lotions powders, deodorant, or perfume/cologne/after shave on the skin. 8. Glasses/contact lenses and dentures may be worn to the hospital.  They will be removed prior to surgery. 9. Call your doctor if symptoms of a cold or illness develop within 24-48 hours prior to your surgery. 10.  AN ADULT MUST DRIVE YOU HOME AFTER OUTPATIENT SURGERY. 11.  If you are having an outpatient procedure, please make arrangements for a responsible adult to be with you for 24 hours after your surgery. 12.  NO VISITORS in the hospital at this time for outpatient procedures. Exceptions may be made for surgical admissions, per hospital guidelines        Special Instructions:      Bring list of CURRENT medications. Bring inhaler. Bring CPAP machine. Bring any pertinent legal medical records. Take these medications the morning of surgery with a sip of water: Diltiazem and Imdur and MD instructions. Follow physician instructions about insulin. Follow physician instructions about stopping anticoagulants. Complete bowel prep per MD instructions.

## 2021-11-30 ENCOUNTER — ANESTHESIA EVENT (OUTPATIENT)
Dept: ENDOSCOPY | Age: 56
End: 2021-11-30
Payer: COMMERCIAL

## 2021-11-30 NOTE — PROGRESS NOTES
"11/30/2021       RE: Ivelisse Sanz   [facility]  [facility] [place] [address]     Dear Colleague,    Thank you for referring your patient, Ivelisse Sanz, to the [facility] VOICE CLINIC [place] at [facility]. Please see a copy of my visit note below.    [facility] VOICE CLINIC  Evaluation report    Clinician: Miguel Ángel Perez M.M., M.A., CCC/SLP  Seen in conjunction with: Dr. Cruz  Referring physician:  Dr. Cook  Patient: Ivelisse Sanz  Date of Visit: 11/30/2021    HISTORY  Chief complaint: Ivelisse Sanz is a 13 year old male presenting today for evaluation of voice, swallowing, and breathing.    Salient history: Of note the patient's father was at today's visit and helped provide the history.  They report that Days voice has always been an issue throughout his life.  When he was a young child he assumed he simply had a \"lighter voice\", but they began to take a special note when his voice did not seem to improve following puberty.  At that point his dentist brought up that it could be thyroid related and this ultimately led to see Dr. Cook.  At that time he was diagnosed with a left true vocal fold paralysis and slightly asymmetric appearance of the left versus right vocal fold.  An injection augmentation was performed in the spring 2021, and patient reported no significant benefit in voice though swallowing was slightly improved.  Subsequently a thyroplasty was performed in June with a size 13 Tristan implant.  Again he noted some improvement in swallowing but no notable change in voice.  He was then referred to speech therapy with a focus on adductory exercises.  When he failed to respond as expected he was referred back to Dr. Cook who ultimately referred him to one of the large [facility] which is how he and his father made today's appointment.    Beyond voice concerns patient also notes shortness of breath both during speech and during activity.  His " 1. Have you been to the ER, urgent care clinic since your last visit? Hospitalized since your last visit?     no    2. Have you seen or consulted any other health care providers outside of the [address] [place] [address] since your last visit? Include any pap smears or colon screening.       No "father stated that he had some breathing issues in his early childhood that seemed like \"asthma attacks.  They were seen at an ED who gave him an albuterol inhaler; however, subsequent follow-up with his pediatrician, and episodic regimen was continued as needed.  He has not seen a pulmonologist or another specialist.  He reports that during the pacer test or other endurance running he lags behind his peers and will get noisy breathing.    CURRENT SYMPTOMS INCLUDE  VOICE    States that he isn't bothered by his voice initially though when we discussed in more detail he states that he would like to do something about it if he could    Difficult to project his voice    Sounds like a \"loud whisper\"    Feels as if he runs out of air as he is talking    COUGH/THROAT CLEARING    Feeling of mucus in the throat    3x per day     SWALLOWING    Coughing with thin liquids    Improved following injection augmentation and thyroplasty    Please see the note by my colleague Lakisha Law CCC-SLP for full details of swallowing concerns and evaluation    BREATHING    When he would practice for football    Would have a challenge with endurance running    Some SOB with activity like going up longer flights    Doesn't have to stop    Feels this is different than his peers    Hasn't changed since the thryoplasty    Patient denies significant cough and pain.     OTHER PERTINENT HISTORY    Otherwise unknown.  Please also refer to Dr. Cruz's dictation.     No past medical history on file.  No past surgical history on file.    OBJECTIVE  PATIENT REPORTED MEASURES  Patient Supplied Answers To VHI Questionnaire  Voice Handicap Index (VHI-10) 11/30/2021   My voice makes it difficult for people to hear me 3   People have difficulty understanding me in a noisy room 4   My voice difficulties restrict my personal and social life.  2   I feel left out of conversations because of my voice 2   My voice problem causes me to lose income 0   I feel as " "though I have to strain to produce voice 2   The clarity of my voice is unpredictable 1   My voice problem upsets me 2   My voice makes me feel handicapped 1   People ask, \"What's wrong with your voice?\" 4   VHI-10 21     Patient Supplied Answers to Dyspnea Index Questionnaire:  Dyspnea Index 11/30/2021   1. I have trouble getting air in. 0   2. I feel tightness in my throat when I am having checo breathing problem. 1   3. It takes more effort to breathe than it used to. 1   4. Change in weather affect my breathing problem. 0   5. My breathing gets worse with stress. 0   6. I make sound/noise breathing in 1   7. I have to strain to breathe. 0   8. My shortness of breath gets worse with exercise or physical activity 3   9. My breathing problem makes me feel stressed. 0   10. My breathing problem casuses me to restrict my personal and social life. 0   Dyspnea Index Total Score 6     Patient Supplied Answers To CSI Questionnaire  Cough Severity Index (CSI) 11/30/2021   My cough is worse when I lie down 0   My coughing problem causes me to restrict my personal and social life 0   I tend to avoid places because of my cough problem 0   I feel embarrassed because of my coughing problem 0   People ask, ''What's wrong?'' because I cough a lot 0   I run out of air when I cough 2   My coughing problem affects my voice 1   My coughing problem limits my physical activity 0   My coughing problem upsets me 0   People ask me if I am sick because I cough a lot 0   CSI Score 3     Patient Supplied Answers To EAT Questionnaire  Eating Assessment Tool (EAT-10) 11/30/2021   My swallowing problem has caused me to lose weight 1   My swallowing problem interferes with my ability to go out for meals 1   Swallowing liquids takes extra effort 0   Swallowing solids takes extra effort 0   Swallowing pills takes extra effort 1   Swallowing is painful 0   The pleasure of eating is affected by my swallowing 0   When I swallow food sticks in my " throat 0   I cough when I eat 1   Swallowing is stressful 0   EAT-10 4     PERCEPTUAL EVALUATION (CPT 02936)  POSTURE / TENSION:     No Overt tension    BREATHING:     appears within normal limits and adequate     Shortened breath groups of 4-6 words    VOICE:    Roughness: Mild to moderate Consistent    Breathiness: Severe Consistent    Strain: Mild to moderate Consistent    Loudness    Conversational speech:  Mild to moderately reduced    Projected speech:  Severely reduced    Pitch:    Conversational speech:  Mildly elevated 150 Hz    Pitch glide:     120-330 hz    Resonance:    Conversational speech: Difficult to accurately assess due to the degree of breathy dysphonia    Singing vs. Speech: Consistent across contexts    CAPE- Overall Severity:  63/100    COUGH/THROAT CLEARING:    Not observed    THERAPY PROBES: No substantial improvement was elicited with therapeutic and probes designed to improve glottic closure    LARYNGEAL EXAMINATION  Procedure: Flexible endoscopy with chip-tip technology with stroboscopy, left nostril; topical anesthesia with 3% Lidocaine and 0.25% phenylephrine was applied.   Performed by: Dr. Manda Cruz  The laryngeal and pharyngeal structures were evaluated for gross appearance, mobility, function, and focal lesions / abnormalities of the associated mucosa.  Stroboscopy was warranted to evaluate closure, symmetry, and vibratory characteristics of the vocal folds.  All findings were within normal limits with the exception of the following salient features:     LEft true vocal fold is immobile in a paramedian position with fairly well supported appearnce of the vibratory margin (no significant concavity)    Right true vocal fold demonstrates brisk motion, but does not seem to cross midline when attempting to achieve glottic closure    Significant posterior gap seen    Vertical height mismatch right above the left    With phonatory tasks there is moderate to severe four-way constrictive  supraglottic recruitment    Decreased hyperfunction is seen with singing tasks, flow based tasks, and improved closure is noted to a subtle degree with forward resonant stimuli    Stroboscopy demonstrates:    Stroboscopic views are significantly limited due to short duration of entrainment    Limited views demonstrate closure of the anterior 2/3 of the membranous vocal fold    Closure still appears open phase    Significant ongoing posterior glottal gap    The laryngeal exam was reviewed with Mr. Sanz, and I provided pertinent explanations, as well as written and oral information.    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  Today's evaluation demonstrates Dysphonia (R49.0) in the context of Left true vocal fold paralysis (J 38.01) and seemingly nonoptimal adduction of the right true vocal fold.  Laryngeal evaluation shows adequate support of the left true vocal fold though ongoing paramedian position.  Right true vocal fold does demonstrate brisk motion but is not able to compensate adequately for left immobility, which may represent a weakness in its own right.  This yields ongoing glottic insufficiency particularly posteriorly as evidenced by visualization and S to Z ratio.  Beyond voicing patient also describes longstanding history of shortness of breath of unknown nature, and a degree of likely paradoxical vocal fold motion based on patient description of inspiratory stridor with more severe episodes.  Functionally during speech patient shows increasingly short of breath groups over successive statements and nonoptimal coordination of respiration with phonation which in addition to breathing concerns may be amenable to behavioral therapy.  Is notable however that glottic configuration and efficiency of closure  was not meaningfully responsive to behavioral probes.    RECOMMENDATIONS:     Given the unclear picture and origin of the patient's left true vocal fold paralysis and possible right true vocal fold paresis evaluation via neurology was recommended by Dr. Cruz.    The possibility of arytenoid adduction was discussed; however, this would not be pursued until a full understanding of the patient's etiology can be gathered as well as allowing the patient to finish growth as he is only 13 at this time and is still smaller than his father.    Although the speech therapy for glottic insufficiency is unlikely to be of substantive benefit at this time he has the potential to derive benefit from adjustment of patterns of respiratory phonatory coordination relative to breath groups and optimization of respiratory mechanics relative to dyspnea during exertion/possible vocal cord dysfunction.    Patient and his father would like to move forward with additional evaluation at this time and will decide at a later date if they wish to pursue speech therapy, and they were encouraged to do so.  Should they decide to pursue speech therapy virtually appointments must be made with my colleagues Aisha Koch or Xavier Pappas, CCC-SLP due to the patient's residence in [place]    Today's appointment is evaluation only.    This treatment plan was developed with the patient who agreed with the recommendations.    TOTAL SERVICE TIME: 60 minutes  EVALUATION OF VOICE AND RESONANCE (23403)  NO CHARGE FACILITY FEE (89740)    Miguel Ángel Perez M.M., M.A., CCC-SLP  Speech-Language Pathologist  Certificate of Vocology  [phone]    *this report was created in part through the use of computerized dictation software, and though reviewed following completion, some typographic errors may persist.  If there is confusion regarding any of this notes contents, please contact me for clarification.*        Again, thank you for allowing me to participate  in the care of your patient.      Sincerely,    Yony Perez, SLP

## 2021-12-01 ENCOUNTER — HOSPITAL ENCOUNTER (OUTPATIENT)
Age: 56
Setting detail: OUTPATIENT SURGERY
Discharge: HOME | End: 2021-12-01
Attending: COLON & RECTAL SURGERY | Admitting: COLON & RECTAL SURGERY
Payer: COMMERCIAL

## 2021-12-01 ENCOUNTER — ANESTHESIA (OUTPATIENT)
Dept: ENDOSCOPY | Age: 56
End: 2021-12-01
Payer: COMMERCIAL

## 2021-12-01 VITALS
BODY MASS INDEX: 33.77 KG/M2 | DIASTOLIC BLOOD PRESSURE: 66 MMHG | TEMPERATURE: 97.6 F | SYSTOLIC BLOOD PRESSURE: 115 MMHG | RESPIRATION RATE: 20 BRPM | OXYGEN SATURATION: 98 % | HEART RATE: 83 BPM | WEIGHT: 228 LBS | HEIGHT: 69 IN

## 2021-12-01 PROCEDURE — 76060000031 HC ANESTHESIA FIRST 0.5 HR: Performed by: COLON & RECTAL SURGERY

## 2021-12-01 PROCEDURE — 2709999900 HC NON-CHARGEABLE SUPPLY: Performed by: COLON & RECTAL SURGERY

## 2021-12-01 PROCEDURE — 74011250637 HC RX REV CODE- 250/637: Performed by: NURSE ANESTHETIST, CERTIFIED REGISTERED

## 2021-12-01 PROCEDURE — 74011250636 HC RX REV CODE- 250/636: Performed by: NURSE ANESTHETIST, CERTIFIED REGISTERED

## 2021-12-01 PROCEDURE — 77030013992 HC SNR POLYP ENDOSC BSC -B: Performed by: COLON & RECTAL SURGERY

## 2021-12-01 PROCEDURE — C1729 CATH, DRAINAGE: HCPCS | Performed by: COLON & RECTAL SURGERY

## 2021-12-01 PROCEDURE — 77030008565 HC TBNG SUC IRR ERBE -B: Performed by: COLON & RECTAL SURGERY

## 2021-12-01 PROCEDURE — 76040000019: Performed by: COLON & RECTAL SURGERY

## 2021-12-01 PROCEDURE — 45378 DIAGNOSTIC COLONOSCOPY: CPT | Performed by: COLON & RECTAL SURGERY

## 2021-12-01 PROCEDURE — 00812 ANES LWR INTST SCR COLSC: CPT | Performed by: ANESTHESIOLOGY

## 2021-12-01 PROCEDURE — 77030021593 HC FCPS BIOP ENDOSC BSC -A: Performed by: COLON & RECTAL SURGERY

## 2021-12-01 PROCEDURE — 00812 ANES LWR INTST SCR COLSC: CPT | Performed by: NURSE ANESTHETIST, CERTIFIED REGISTERED

## 2021-12-01 RX ORDER — FENTANYL CITRATE 50 UG/ML
25 INJECTION, SOLUTION INTRAMUSCULAR; INTRAVENOUS AS NEEDED
Status: CANCELLED | OUTPATIENT
Start: 2021-12-01

## 2021-12-01 RX ORDER — PROPOFOL 10 MG/ML
INJECTION, EMULSION INTRAVENOUS AS NEEDED
Status: DISCONTINUED | OUTPATIENT
Start: 2021-12-01 | End: 2021-12-01 | Stop reason: HOSPADM

## 2021-12-01 RX ORDER — FAMOTIDINE 20 MG/1
20 TABLET, FILM COATED ORAL ONCE
Status: COMPLETED | OUTPATIENT
Start: 2021-12-01 | End: 2021-12-01

## 2021-12-01 RX ORDER — SODIUM CHLORIDE, SODIUM LACTATE, POTASSIUM CHLORIDE, CALCIUM CHLORIDE 600; 310; 30; 20 MG/100ML; MG/100ML; MG/100ML; MG/100ML
25 INJECTION, SOLUTION INTRAVENOUS CONTINUOUS
Status: DISCONTINUED | OUTPATIENT
Start: 2021-12-01 | End: 2021-12-01 | Stop reason: HOSPADM

## 2021-12-01 RX ORDER — SODIUM CHLORIDE, SODIUM LACTATE, POTASSIUM CHLORIDE, CALCIUM CHLORIDE 600; 310; 30; 20 MG/100ML; MG/100ML; MG/100ML; MG/100ML
25 INJECTION, SOLUTION INTRAVENOUS CONTINUOUS
Status: CANCELLED | OUTPATIENT
Start: 2021-12-01

## 2021-12-01 RX ORDER — FENTANYL CITRATE 50 UG/ML
50 INJECTION, SOLUTION INTRAMUSCULAR; INTRAVENOUS
Status: CANCELLED | OUTPATIENT
Start: 2021-12-01

## 2021-12-01 RX ORDER — MAGNESIUM SULFATE 100 %
4 CRYSTALS MISCELLANEOUS AS NEEDED
Status: CANCELLED | OUTPATIENT
Start: 2021-12-01

## 2021-12-01 RX ORDER — OXYCODONE AND ACETAMINOPHEN 5; 325 MG/1; MG/1
1 TABLET ORAL AS NEEDED
Status: CANCELLED | OUTPATIENT
Start: 2021-12-01

## 2021-12-01 RX ORDER — DEXTROSE 50 % IN WATER (D50W) INTRAVENOUS SYRINGE
25-50 AS NEEDED
Status: CANCELLED | OUTPATIENT
Start: 2021-12-01

## 2021-12-01 RX ADMIN — SODIUM CHLORIDE, SODIUM LACTATE, POTASSIUM CHLORIDE, AND CALCIUM CHLORIDE 25 ML/HR: 600; 310; 30; 20 INJECTION, SOLUTION INTRAVENOUS at 10:33

## 2021-12-01 RX ADMIN — PROPOFOL 20 MG: 10 INJECTION, EMULSION INTRAVENOUS at 11:20

## 2021-12-01 RX ADMIN — PROPOFOL 20 MG: 10 INJECTION, EMULSION INTRAVENOUS at 11:13

## 2021-12-01 RX ADMIN — PROPOFOL 20 MG: 10 INJECTION, EMULSION INTRAVENOUS at 11:14

## 2021-12-01 RX ADMIN — FAMOTIDINE 20 MG: 20 TABLET ORAL at 10:08

## 2021-12-01 RX ADMIN — PROPOFOL 60 MG: 10 INJECTION, EMULSION INTRAVENOUS at 11:08

## 2021-12-01 RX ADMIN — PROPOFOL 20 MG: 10 INJECTION, EMULSION INTRAVENOUS at 11:18

## 2021-12-01 RX ADMIN — PROPOFOL 20 MG: 10 INJECTION, EMULSION INTRAVENOUS at 11:11

## 2021-12-01 RX ADMIN — PROPOFOL 20 MG: 10 INJECTION, EMULSION INTRAVENOUS at 11:16

## 2021-12-01 RX ADMIN — PROPOFOL 20 MG: 10 INJECTION, EMULSION INTRAVENOUS at 11:12

## 2021-12-01 RX ADMIN — PROPOFOL 10 MG: 10 INJECTION, EMULSION INTRAVENOUS at 11:22

## 2021-12-01 RX ADMIN — PROPOFOL 20 MG: 10 INJECTION, EMULSION INTRAVENOUS at 11:09

## 2021-12-01 RX ADMIN — PROPOFOL 20 MG: 10 INJECTION, EMULSION INTRAVENOUS at 11:10

## 2021-12-01 NOTE — H&P
HPI: Lauraine Snellen is a 60 y.o. male presenting with chief complain of hx polyps.     Past Medical History:   Diagnosis Date    Abnormal tilt table test 2013    Back pain     Back pain     CAD (coronary artery disease)     Coronary artery spasm (HCC)     Difficult intubation     15 years ago during thumb sx, no problem since then    GERD (gastroesophageal reflux disease)     Glaucoma     Hearing loss 2021    has an audiologist, has hearing aids    Hypertension     Kidney stone     Migraine     migraines    Prediabetes     S/P colonoscopic polypectomy 09/2016    Sleep apnea     uses cpap    Syncope     has had recurrent issues, has had work-up to include EEG, monitor, tilt table, Carotids, ECHO       Past Surgical History:   Procedure Laterality Date    COLONOSCOPY N/A 9/30/2016    COLONOSCOPY with biopsy performed by Rex Lopez MD at [facility] ENDOSCOPY    HX BACK SURGERY      x3    HX COLONOSCOPY  09/2016    Dr. Jonah Mcdonough HX ENDOSCOPY      reportedly normal by patient    HX HEENT      sinus sx    HX KNEE REPLACEMENT Right 08/13/2020    Dr Yoli Rasmussen      left shoulder x 3, right knee, left thumb, left great toe sx    HX ORTHOPAEDIC Right     elbow    HX UROLOGICAL      Lithotripsy    [place] CARDIAC SURG PROCEDURE UNLIST      cardiac catheterization times 5       Family History   Problem Relation Age of Onset    Hypertension Mother     Diabetes Mother     Hypertension Father     Cancer Father         Prostate    Heart Disease Father     Heart Attack Father     Stroke Brother     Migraines Brother     Cancer Sister         Breast    Colon Polyps Sister     Migraines Sister     Stroke Brother        Social History     Socioeconomic History    Marital status:    Occupational History    Occupation:    Tobacco Use    Smoking status: Never Smoker    Smokeless tobacco: Never Used   Vaping Use    Vaping Use: Never used   Substance and Sexual Activity    Alcohol use: Yes     Comment: rare use    Drug use: No    Sexual activity: Yes     Partners: Female   Social History Narrative    ** Merged History Encounter **            Review of Systems - neg    Outpatient Medications Marked as Taking for the 12/1/21 encounter [facility] Encounter)   Medication Sig Dispense Refill    sertraline (ZOLOFT) 100 mg tablet TAKE 1 TABLET BY MOUTH  DAILY 90 Tablet 3    rosuvastatin (CRESTOR) 10 mg tablet TAKE 1 TABLET BY MOUTH AT  NIGHT 90 Tablet 3    nortriptyline (PAMELOR) 25 mg capsule TAKE 1 CAPSULE BY MOUTH AT  NIGHT FOR HEADACHES 90 Capsule 3    isosorbide mononitrate ER (IMDUR) 60 mg CR tablet Take 1 Tablet by mouth every morning. 90 Tablet 1    dilTIAZem ER (CARDIZEM CD) 240 mg capsule Take 1 Capsule by mouth daily. 90 Capsule 3    Vitamin D2 1,250 mcg (50,000 unit) capsule TAKE 1 CAPSULE BY MOUTH  EVERY 7 DAYS 12 Capsule 3    tamsulosin (FLOMAX) 0.4 mg capsule TAKE 1 CAPSULE BY MOUTH  DAILY AFTER DINNER 90 Cap 3    aspirin delayed-release 81 mg tablet Take 81 mg by mouth daily.  omeprazole (PRILOSEC) 40 mg capsule TAKE 1 CAPSULE BY MOUTH  DAILY 90 Cap 3    nitroglycerin (NITROLINGUAL) 400 mcg/spray spray USE 1 SPRAY ONTO OR UNDER  TONGUE EVERY 5 MINUTES AS  NEEDED FOR CHEST PAIN. MAX  OF 3 SPRAYS IN 15 MINUTES. CALL 911 IF PAIN PERSISTS 4.9 g 3    latanoprost (XALATAN) 0.005 % ophthalmic solution Administer 1 Drop to both eyes daily. 1    multivitamin (ONE A DAY) tablet Take 1 Tab by mouth daily.  cpap machine kit by Does Not Apply route. CPAP at 11 cm with humidifier. Mask: Wisp nasal, large. Length of need 99 months. Replace mask and accessories as needed times 12 months. Download data at 30 days and fax at [phone]. Dx- DB on CPAP, RLS, Obesity (Patient taking differently: by Does Not Apply route. CPAP at 11 cm with humidifier. Mask: Wisp nasal, large. Length of need 99 months. Replace mask and accessories as needed times 12 months. Download data at 30 days and fax at [phone]. Dx- DB on CPAP, RLS, Obesity  [facility]) 1 Kit 0       No Known Allergies    Vitals:    11/23/21 1418 12/01/21 1015   BP:  126/87   Pulse:  82   Resp:  20   Temp:  98.4 °F (36.9 °C)   SpO2:  97%   Weight: 102.1 kg (225 lb) 103.4 kg (228 lb)   Height: 5' 9\" (1.753 m) 5' 9\" (1.753 m)       Physical Exam  Constitutional:       Appearance: He is well-developed. HENT:      Head: Normocephalic and atraumatic. Eyes:      Conjunctiva/sclera: Conjunctivae normal.   Abdominal:      General: There is no distension. Palpations: Abdomen is soft. There is no mass. Tenderness: There is no abdominal tenderness. Musculoskeletal:         General: Normal range of motion. Lymphadenopathy:      Cervical: No cervical adenopathy. Skin:     General: Skin is warm and dry. Neurological:      Sensory: No sensory deficit. Psychiatric:         Speech: Speech normal.         Assessment / Plan    colonoscopy    The diagnoses and plan were discussed with the patient. All questions answered. Plan of care agreed to by all concerned.

## 2021-12-01 NOTE — OP NOTES
[facility]  [address], [address] [place],[address]   [place], [address]  [phone]                    Colonoscopy Procedure Note      Robson Cruz  1965  [id number]                Date of Procedure: 12/1/2021    Preoperative diagnosis: Colon cancer Screening:  Z12.11    Postoperative diagnosis: normal    :  Isma Seals MD    Assistant(s): Endoscopy Technician-1: Karmen Dawson  Endoscopy RN-1: Cheyanne Boo RN    Sedation: MAC    Complications: None    Implants: None    Procedure Details:  Prior to the procedure, a history and physical were performed. The patients medications, allergies and sensitivities were reviewed and all questions were answered. After informed consent was obtained for the procedure, with all risks and benefits of procedure explained. The patient was taken to the endoscopy suite and placed in the left lateral decubitus position. Patient identification and proposed procedure were verified prior to the procedure by the nurse and I. After sequential anesthesia administered by anesthesiologist, a digital rectal exam was performed and was normal.  The Olympus video colonoscope was introduced through the anus and advanced to cecum, which was identified by the ileocecal valve and appendiceal orifice. The quality of preparation was fair. The colonoscope was slowly withdrawn and the mucosa examined for any abnormalities. Cecal withdrawal time was greater than 6 minutes. The patient tolerated the procedure well. There were no complications. Findings/Interventions:   Polyps - none    EBL: none    Recommendations: -Repeat colonoscopy in 5 years. Resume normal medication(s).      Discharge Disposition:  Jessee Law MD  12/1/2021  11:26 AM

## 2021-12-01 NOTE — ANESTHESIA PREPROCEDURE EVALUATION
Relevant Problems   RESPIRATORY SYSTEM   (+) DB on CPAP      NEUROLOGY   (+) Chronic daily headache   (+) Major depressive disorder with single episode   (+) Recurrent depression (HCC)      CARDIOVASCULAR   (+) Coronary artery disease involving native coronary artery of native heart with angina pectoris with documented spasm (HCC)   (+) Essential hypertension with goal blood pressure less than 140/90      GASTROINTESTINAL   (+) Gastroesophageal reflux disease without esophagitis      RENAL FAILURE   (+) Nephrolithiasis      ENDOCRINE   (+) Severe obesity (BMI 35.0-39. 9) with comorbidity ([address]       Anesthetic History     Increased risk of difficult airway          Review of Systems / Medical History  Patient summary reviewed and pertinent labs reviewed    Pulmonary        Sleep apnea: CPAP           Neuro/Psych         Psychiatric history     Cardiovascular    Hypertension: well controlled          CAD    Exercise tolerance: >4 METS  Comments: Documented coronary spasm syndrome, no significant CAD, uses NTG spray occasionally for relief of symptoms. Nl LV function.      GI/Hepatic/Renal     GERD: well controlled           Endo/Other        Morbid obesity and arthritis     Other Findings              Physical Exam    Airway  Mallampati: III  TM Distance: 4 - 6 cm  Neck ROM: decreased range of motion   Mouth opening: Diminished (comment)     Cardiovascular    Rhythm: regular  Rate: normal         Dental  No notable dental hx       Pulmonary  Breath sounds clear to auscultation               Abdominal  GI exam deferred       Other Findings            Anesthetic Plan    ASA: 3  Anesthesia type: MAC - femoral single shot          Induction: Intravenous  Anesthetic plan and risks discussed with: Patient

## 2021-12-01 NOTE — ANESTHESIA POSTPROCEDURE EVALUATION
Procedure(s):  COLONOSCOPY. MAC    Anesthesia Post Evaluation      Multimodal analgesia: multimodal analgesia used between 6 hours prior to anesthesia start to PACU discharge  Patient location during evaluation: PACU  Patient participation: complete - patient participated  Level of consciousness: awake and alert  Pain management: adequate  Airway patency: patent  Anesthetic complications: no  Cardiovascular status: acceptable  Respiratory status: acceptable  Hydration status: acceptable  Post anesthesia nausea and vomiting:  none  Final Post Anesthesia Temperature Assessment:  Normothermia (36.0-37.5 degrees C)      INITIAL Post-op Vital signs:   Vitals Value Taken Time   /75 12/01/21 1152   Temp 36.7 °C (98 °F) 12/01/21 1135   Pulse 87 12/01/21 1155   Resp 16 12/01/21 1135   SpO2 97 % 12/01/21 1155   Vitals shown include unvalidated device data.

## 2021-12-01 NOTE — DISCHARGE INSTRUCTIONS
From Dr. Shalonda Banda: FOLLOW UP VISIT Appointment in: Other (Specify) Repeat colonoscopy in 5 year(s). Colonoscopy: What to Expect at [address] [place] [address]  After you have a colonoscopy, you will stay at the clinic for 1 to 2 hours until the medicines wear off. Then you can go home. But you will need to arrange for a ride. Your doctor will tell you when you can eat and do your other usual activities. Your doctor will talk to you about when you will need your next colonoscopy. Your doctor can help you decide how often you need to be checked. This will depend on the results of your test and your risk for colorectal cancer. After the test, you may be bloated or have gas pains. You may need to pass gas. If a biopsy was done or a polyp was removed, you may have streaks of blood in your stool (feces) for a few days. This care sheet gives you a general idea about how long it will take for you to recover. But each person recovers at a different pace. Follow the steps below to get better as quickly as possible. How can you care for yourself at home? Activity  · Rest when you feel tired. · You can do your normal activities when it feels okay to do so. Diet  · Follow your doctor's directions for eating. · Unless your doctor has told you not to, drink plenty of fluids. This helps to replace the fluids that were lost during the colon prep. · Do not drink alcohol. Medicines  · If polyps were removed or a biopsy was done during the test, your doctor may tell you not to take aspirin or other anti-inflammatory medicines for a few days. These include ibuprofen (Advil, Motrin) and naproxen (Aleve). Other instructions  · For your safety, do not drive or operate machinery until the medicine wears off and you can think clearly. Your doctor may tell you not to drive or operate machinery until the day after your test.  · Do not sign legal documents or make major decisions until the medicine wears off and you can think clearly. The anesthesia can make it hard for you to fully understand what you are agreeing to. Follow-up care is a key part of your treatment and safety. Be sure to make and go to all appointments, and call your doctor if you are having problems. It's also a good idea to know your test results and keep a list of the medicines you take. When should you call for help? Call 911 anytime you think you may need emergency care. For example, call if:  · You passed out (lost consciousness). · You pass maroon or bloody stools. · You have severe belly pain. Call your doctor now or seek immediate medical care if:  · Your stools are black and tarlike. · Your stools have streaks of blood, but you did not have a biopsy or any polyps removed. · You have belly pain, or your belly is swollen and firm. · You vomit. · You have a fever. · You are very dizzy. Watch closely for changes in your health, and be sure to contact your doctor if you have any problems. Where can you learn more? Go to [employer]  Enter E264 in the search box to learn more about \"Colonoscopy: What to Expect at Home. \"   © 6322-7565 Healthwise, Incorporated. Care instructions adapted under license by [address] [address] [employer] (which disclaims liability or warranty for this information). This care instruction is for use with your licensed healthcare professional. If you have questions about a medical condition or this instruction, always ask your healthcare professional. [place] [address] any warranty or liability for your use of this information. Content Version: 76.9.829222; Current as of: November 14, 2014      DISCHARGE SUMMARY from Nurse     POST-PROCEDURE INSTRUCTIONS:    Call your Physician if you:  ? Observe any excess bleeding. ? Develop a temperature over 100.5o F.  ? Experience abdominal, shoulder or chest pain. ?  Notice any signs of decreased circulation or nerve impairment to an extremity such as a change in color, persistent numbness, tingling, coldness or increase in pain. ? Vomit blood or you have nausea and vomiting lasting longer than 4 hours. ? Are unable to take medications. ? Are unable to urinate within 8 hours after discharge following general anesthesia or intravenous sedation. For the next 24 hours after receiving general anesthesia or intravenous sedation, or while taking prescription Narcotics, limit your activities:  ? Do NOT drive a motor vehicle, operate hazard machinery or power tools, or perform tasks that require coordination. The medication you received during your procedure may have some effect on your mental awareness. ? Do NOT make important personal or business decisions. The medication you received during your procedure may have some effect on your mental awareness. ? Do NOT drink alcoholic beverages. These drinks do not mix well with the medications that have been given to you. ? Upon discharge from the hospital, you must be accompanied by a responsible adult. ? Resume your diet as directed by your physician. ? Resume medications as your physician has prescribed. ? Please give a list of your current medications to your Primary Care Provider. ? Please update this list whenever your medications are discontinued, doses are changed, or new medications (including over-the-counter products) are added. ? Please carry medication information at all times in case of emergency situations. These are general instructions for a healthy lifestyle:    No smoking/ No tobacco products/ Avoid exposure to second hand smoke.  Surgeon General's Warning:  Quitting smoking now greatly reduces serious risk to your health. Obesity, smoking, and a sedentary lifestyle greatly increase your risk for illness.    A healthy diet, regular physical exercise & weight monitoring are important for maintaining a healthy lifestyle   You may be retaining fluid if you have a history of heart failure or if you experience any of the following symptoms:  Weight gain of 3 pounds or more overnight or 5 pounds in a week, increased swelling in our hands or feet or shortness of breath while lying flat in bed. Please call your doctor as soon as you notice any of these symptoms; do not wait until your next office visit. Colorectal Screening   Colorectal cancer almost always develops from precancerous polyps (abnormal growths) in the colon or rectum. Screening tests can find precancerous polyps, so that they can be removed before they turn into cancer. Screening tests can also find colorectal cancer early, when treatment works best.  [facility] Speak with your physician about when you should begin screening and how often you should be tested. Additional Information    Educational references and/or instructions provided during this visit included:    See attached. APPOINTMENTS:    Per MD Instruction. Discharge information has been reviewed with the patient. The patient verbalized understanding.

## 2021-12-02 ENCOUNTER — OFFICE VISIT (OUTPATIENT)
Dept: CARDIOLOGY CLINIC | Age: 56
End: 2021-12-02
Payer: COMMERCIAL

## 2021-12-02 VITALS
HEART RATE: 79 BPM | WEIGHT: 232 LBS | BODY MASS INDEX: 34.36 KG/M2 | HEIGHT: 69 IN | SYSTOLIC BLOOD PRESSURE: 136 MMHG | DIASTOLIC BLOOD PRESSURE: 92 MMHG

## 2021-12-02 DIAGNOSIS — Z99.89 OSA ON CPAP: ICD-10-CM

## 2021-12-02 DIAGNOSIS — G47.33 OSA ON CPAP: ICD-10-CM

## 2021-12-02 DIAGNOSIS — I25.111 CORONARY ARTERY DISEASE INVOLVING NATIVE CORONARY ARTERY OF NATIVE HEART WITH ANGINA PECTORIS WITH DOCUMENTED SPASM (HCC): Primary | ICD-10-CM

## 2021-12-02 DIAGNOSIS — E78.5 HYPERLIPIDEMIA, UNSPECIFIED HYPERLIPIDEMIA TYPE: ICD-10-CM

## 2021-12-02 DIAGNOSIS — I10 ESSENTIAL HYPERTENSION WITH GOAL BLOOD PRESSURE LESS THAN 140/90: ICD-10-CM

## 2021-12-02 DIAGNOSIS — R55 VASOVAGAL SYNCOPE: ICD-10-CM

## 2021-12-02 PROCEDURE — 99214 OFFICE O/P EST MOD 30 MIN: CPT | Performed by: INTERNAL MEDICINE

## 2021-12-02 RX ORDER — ISOSORBIDE MONONITRATE 60 MG/1
60 TABLET, EXTENDED RELEASE ORAL
Qty: 90 TABLET | Refills: 3 | Status: SHIPPED | OUTPATIENT
Start: 2021-12-02 | End: 2022-10-27 | Stop reason: SDUPTHER

## 2021-12-02 NOTE — PROGRESS NOTES
1. Have you been to the ER, urgent care clinic since your last visit? Hospitalized since your last visit? No    2. Have you seen or consulted any other health care providers outside of the [address] [place] [address] since your last visit? Include any pap smears or colon screening.       No

## 2021-12-02 NOTE — PROGRESS NOTES
HISTORY OF PRESENT ILLNESS  Son Silveira is a 60 y.o. male. Patient with cad,coronary spasm,htn  On follow up patient denies any sob, palpitation or other significant symptoms. 2/2020  Seen for follow-up after recent admission. Episode of syncope started while he was standing in line for longer time started with dizziness subsequent syncope. Occasional lightheaded when standing in 1 position. He had positive tilt table in 2013. No recent syncopal episodes. 1/2021  Patient seen today for follow-up. Complains of chest tightness on the left side. Under significant stress at present. Also feels malaise and fatigue. Hypertension  The history is provided by the patient. This is a chronic problem. The problem occurs constantly. The problem has not changed since onset. Associated symptoms include chest pain. Cholesterol Problem  The history is provided by the patient. This is a chronic problem. The problem occurs constantly. The problem has not changed since onset. Associated symptoms include chest pain. Chest Pain (Angina)   The history is provided by the patient. This is a recurrent problem. The problem has been gradually worsening. The problem occurs every several days. The pain is associated with exertion and rest. The pain is present in the substernal region. The pain is mild. The quality of the pain is described as pressure-like. The pain does not radiate. Pertinent negatives include no claudication, no cough, no dizziness, no fever, no hemoptysis, no nausea, no orthopnea, no palpitations, no PND, no sputum production, no vomiting and no weakness. He has tried nitroglycerin for the symptoms. Review of Systems   Constitutional: Negative for chills and fever. HENT: Negative for nosebleeds. Eyes: Negative for blurred vision and double vision. Respiratory: Negative for cough, hemoptysis, sputum production and wheezing. Cardiovascular: Positive for chest pain.  Negative for palpitations, orthopnea, claudication, leg swelling and PND. Gastrointestinal: Negative for heartburn, nausea and vomiting. Musculoskeletal: Negative for myalgias. Skin: Negative for rash. Neurological: Negative for dizziness and weakness. Endo/Heme/Allergies: Does not bruise/bleed easily.      Family History   Problem Relation Age of Onset    Hypertension Mother     Diabetes Mother     Hypertension Father     Cancer Father         Prostate    Heart Disease Father     Heart Attack Father     Stroke Brother     Migraines Brother     Cancer Sister         Breast    Colon Polyps Sister    Kelle Solum Migraines Sister     Stroke Brother        Past Medical History:   Diagnosis Date    Abnormal tilt table test 2013    Back pain     Back pain     CAD (coronary artery disease)     Coronary artery spasm ([address]     Difficult intubation     15 years ago during thumb sx, no problem since then    GERD (gastroesophageal reflux disease)     Glaucoma     Hearing loss 2021    has an audiologist, has hearing aids    Hypertension     Kidney stone     Migraine     migraines    Prediabetes     S/P colonoscopic polypectomy 09/2016    Sleep apnea     uses cpap    Syncope     has had recurrent issues, has had work-up to include EEG, monitor, tilt table, Carotids, ECHO       Past Surgical History:   Procedure Laterality Date    COLONOSCOPY N/A 9/30/2016    COLONOSCOPY with biopsy performed by Nader Connelly MD at [facility] COLONOSCOPY N/A 12/1/2021    COLONOSCOPY performed by Birdia Severance, MD at [address] [place] [address] HX [address] [facility]      x3    HX COLONOSCOPY  09/2016    Dr. Evan Carrillo HX ENDOSCOPY      reportedly normal by patient    HX HEENT      sinus sx    HX KNEE REPLACEMENT Right 08/13/2020    Dr [facility]      left shoulder x 3, right knee, left thumb, left great toe sx    HX ORTHOPAEDIC Right     elbow    HX UROLOGICAL      Lithotripsy    IN CARDIAC SURG PROCEDURE UNLIST      cardiac catheterization times 5       Social History     Tobacco Use    Smoking status: Never Smoker    Smokeless tobacco: Never Used   Substance Use Topics    Alcohol use: Yes     Comment: rare use       No Known Allergies        Visit Vitals  BP (!) 136/92   Pulse 79   Ht 5' 9\" (1.753 m)   Wt 105.2 kg (232 lb)   BMI 34.26 kg/m²         Physical Exam  Constitutional:       Appearance: He is well-developed. HENT:      Head: Normocephalic and atraumatic. Eyes:      Conjunctiva/sclera: Conjunctivae normal.   Neck:      Thyroid: No thyromegaly. Vascular: No JVD. Trachea: No tracheal deviation. Cardiovascular:      Rate and Rhythm: Normal rate and regular rhythm. Heart sounds: Normal heart sounds. No murmur heard. No friction rub. No gallop. Pulmonary:      Effort: No respiratory distress. Breath sounds: Normal breath sounds. No wheezing or rales. Chest:      Chest wall: No tenderness. Abdominal:      Palpations: Abdomen is soft. Tenderness: There is no abdominal tenderness. Musculoskeletal:      Cervical back: Neck supple. Skin:     General: Skin is warm and dry. Neurological:      Mental Status: He is alert and oriented to person, place, and time. Mr. Ra Plascencia has a reminder for a \"due or due soon\" health maintenance. I have asked that he contact his primary care provider for follow-up on this health maintenance. Cath-2004  rca spasm-  lcx 30-40%  Stress test-2015  Fixed ant defect  Echo-2015  Normal lvef  ekg-7/2016-  wnl  I Have personally reviewed recent relevant labs available and discussed with patient  5/2019-BMP, LFT, lipids. LDL-49    Echo Cardiogram Complete2/3/2020  [address]  Component Name Value Ref Range   EF Echo 60     Result Impression   :   NORMAL LEFT VENTRICULAR CAVITY SIZE AND SYSTOLIC FUNCTION WITH AN EJECTION FRACTION OF  60 %. ABNORMAL DIASTOLIC FILLING PATTERN. MITRAL ANNULAR CALCIFICATION WITH MILD MITRAL REGURGITATION.    SCLEROTIC TRILEAFLET AORTIC VALVE WITHOUT EVIDENCE OF STENOSIS. TRACE OF TRICUSPID REGURGITATION WITH A RVSP OF 38 MMHG. STRUCTURALLY NORMAL PULMONIC VALVE WITH TRACE PULMONIC REGURGITATION. NO EVIDENCE OF PERICARDIAL EFFUSION. NO MASSES, SHUNTS OR THROMBI SEEN. NO PREVIOUS REPORT FOR COMPARISON. 2/2020- carotid artery PVL  Conclusions: Normal extracranial carotid and vertebral duplex examination. I Have personally reviewed recent relevant labs available and discussed with patient  2/2021    Assessment         ICD-10-CM ICD-9-CM    1. Coronary artery disease involving native coronary artery of native heart with angina pectoris with documented spasm (HCC)  I25.111 414.01      413.9     Stable symptoms continue medical management monitor   2. Essential hypertension with goal blood pressure less than 140/90  I10 401.9     Stable monitor   3. Hyperlipidemia, unspecified hyperlipidemia type  E78.5 272.4     Continue treatment lab with PCP   4. Vasovagal syncope  R55 780.2     Stable monitor   5. DB on CPAP  G47.33 327.23     Z99.89 V46.8     Continue treatment follow-up with PCP   Had tried cardizem in past -had drop in bp  No acei/normal lvef  2/2019  Angina significantly improved on addition of Imdur. Blood pressure controlled continue medical management  In 2019  Cardiac status stable. Angina stable. He has orthostatic dizziness. Likely from neuropathy. Will use support stocking. Consider additional medication if required if symptoms are persistent  2/2020  Recent syncope clinically vasovagal.  History of positive tilt table in 2013. Will discontinue amlodipine and monitor as blood pressure on low normal side. Monitor for angina and spasm. Continue with support stocking. Pressure maneuvers and if needed midodrine  7/2020  Cardiac status stable. Okay to proceed with knee surgery with medium cardiac risk. No recurrence of syncope or angina  1/2021  Recent increase in chest tightness.   Currently takes Imdur 60 mg increased amlodipine to 10 mg a day. Monitor blood pressure. Ordered lab including TSH LFT and lipids and BMP  5/2021  Episodes of palpitation and tachycardia noted. We will change amlodipine to diltiazem. Continue other treatment and monitor. Blood pressure starts elevating might need a combination of diltiazem and amlodipine  6/2021  Cardiac status stable. Blood pressure much better controlled. Heart rate better. Palpitations are better continue treatment  11/2021  Stable cardiac status. Intermittent use of nitroglycerin. Continue current medical management  Medications Discontinued During This Encounter   Medication Reason    diclofenac (VOLTAREN XR) 100 mg ER tablet Not A Current Medication    tramadol HCl (TRAMADOL PO) Not A Current Medication    lidocaine 4 % patch Not A Current Medication    ascorbic acid, vitamin C, (Vitamin C) 500 mg tablet Not A Current Medication    isosorbide mononitrate ER (IMDUR) 60 mg CR tablet REORDER       Orders Placed This Encounter    isosorbide mononitrate ER (IMDUR) 60 mg CR tablet     Sig: Take 1 Tablet by mouth every morning. Dispense:  90 Tablet     Refill:  3     Requesting 1 year supply       Follow-up and Dispositions    · Return in about 6 months (around 6/2/2022).

## 2021-12-06 DIAGNOSIS — K21.9 GASTROESOPHAGEAL REFLUX DISEASE WITHOUT ESOPHAGITIS: ICD-10-CM

## 2021-12-07 RX ORDER — OMEPRAZOLE 40 MG/1
CAPSULE, DELAYED RELEASE ORAL
Qty: 90 CAPSULE | Refills: 0 | Status: SHIPPED | OUTPATIENT
Start: 2021-12-07 | End: 2022-01-30

## 2021-12-27 RX ORDER — ERGOCALCIFEROL 1.25 MG/1
CAPSULE ORAL
Qty: 13 CAPSULE | Refills: 0 | Status: SHIPPED | OUTPATIENT
Start: 2021-12-27 | End: 2022-03-22

## 2022-01-26 DIAGNOSIS — I25.111 CORONARY ARTERY DISEASE INVOLVING NATIVE CORONARY ARTERY OF NATIVE HEART WITH ANGINA PECTORIS WITH DOCUMENTED SPASM (HCC): ICD-10-CM

## 2022-01-27 RX ORDER — NITROGLYCERIN 400 UG/1
SPRAY ORAL
Qty: 4.9 G | Refills: 3 | Status: SHIPPED | OUTPATIENT
Start: 2022-01-27

## 2022-01-29 DIAGNOSIS — K21.9 GASTROESOPHAGEAL REFLUX DISEASE WITHOUT ESOPHAGITIS: ICD-10-CM

## 2022-01-30 RX ORDER — OMEPRAZOLE 40 MG/1
CAPSULE, DELAYED RELEASE ORAL
Qty: 90 CAPSULE | Refills: 0 | Status: SHIPPED | OUTPATIENT
Start: 2022-01-30 | End: 2022-05-23

## 2022-03-18 PROBLEM — F33.9 RECURRENT DEPRESSION (HCC): Status: ACTIVE | Noted: 2018-01-05

## 2022-03-18 PROBLEM — E78.5 HYPERLIPIDEMIA: Status: ACTIVE | Noted: 2017-01-30

## 2022-03-19 PROBLEM — R55 VASOVAGAL SYNCOPE: Status: ACTIVE | Noted: 2020-02-14

## 2022-03-19 PROBLEM — Z96.651 TOTAL KNEE REPLACEMENT STATUS, RIGHT: Status: ACTIVE | Noted: 2020-08-13

## 2022-03-20 PROBLEM — E66.01 SEVERE OBESITY (BMI 35.0-39.9) WITH COMORBIDITY (HCC): Status: ACTIVE | Noted: 2018-04-27

## 2022-03-20 PROBLEM — R51.9 CHRONIC DAILY HEADACHE: Status: ACTIVE | Noted: 2017-10-20

## 2022-03-21 ENCOUNTER — OFFICE VISIT (OUTPATIENT)
Dept: FAMILY MEDICINE CLINIC | Age: 57
End: 2022-03-21
Payer: COMMERCIAL

## 2022-03-21 VITALS
HEIGHT: 69 IN | DIASTOLIC BLOOD PRESSURE: 72 MMHG | RESPIRATION RATE: 16 BRPM | BODY MASS INDEX: 34.07 KG/M2 | SYSTOLIC BLOOD PRESSURE: 134 MMHG | HEART RATE: 70 BPM | TEMPERATURE: 98.2 F | OXYGEN SATURATION: 97 % | WEIGHT: 230 LBS

## 2022-03-21 DIAGNOSIS — F32.4 MAJOR DEPRESSIVE DISORDER WITH SINGLE EPISODE, IN PARTIAL REMISSION (HCC): ICD-10-CM

## 2022-03-21 DIAGNOSIS — R51.9 CHRONIC DAILY HEADACHE: ICD-10-CM

## 2022-03-21 DIAGNOSIS — K21.9 GASTROESOPHAGEAL REFLUX DISEASE WITHOUT ESOPHAGITIS: ICD-10-CM

## 2022-03-21 DIAGNOSIS — E66.9 OBESITY WITH SERIOUS COMORBIDITY, UNSPECIFIED CLASSIFICATION, UNSPECIFIED OBESITY TYPE: ICD-10-CM

## 2022-03-21 DIAGNOSIS — Z23 ENCOUNTER FOR IMMUNIZATION: ICD-10-CM

## 2022-03-21 DIAGNOSIS — E78.5 HYPERLIPIDEMIA, UNSPECIFIED HYPERLIPIDEMIA TYPE: ICD-10-CM

## 2022-03-21 DIAGNOSIS — Z99.89 OSA ON CPAP: ICD-10-CM

## 2022-03-21 DIAGNOSIS — R73.03 PREDIABETES: Primary | ICD-10-CM

## 2022-03-21 DIAGNOSIS — K63.5 POLYP OF COLON, UNSPECIFIED PART OF COLON, UNSPECIFIED TYPE: ICD-10-CM

## 2022-03-21 DIAGNOSIS — I10 ESSENTIAL HYPERTENSION WITH GOAL BLOOD PRESSURE LESS THAN 140/90: ICD-10-CM

## 2022-03-21 DIAGNOSIS — N40.0 BENIGN PROSTATIC HYPERPLASIA, UNSPECIFIED WHETHER LOWER URINARY TRACT SYMPTOMS PRESENT: ICD-10-CM

## 2022-03-21 DIAGNOSIS — N20.0 KIDNEY STONE: ICD-10-CM

## 2022-03-21 DIAGNOSIS — G47.33 OSA ON CPAP: ICD-10-CM

## 2022-03-21 LAB — HBA1C MFR BLD HPLC: 5.8 %

## 2022-03-21 PROCEDURE — 83036 HEMOGLOBIN GLYCOSYLATED A1C: CPT | Performed by: FAMILY MEDICINE

## 2022-03-21 PROCEDURE — 90715 TDAP VACCINE 7 YRS/> IM: CPT | Performed by: FAMILY MEDICINE

## 2022-03-21 PROCEDURE — 99214 OFFICE O/P EST MOD 30 MIN: CPT | Performed by: FAMILY MEDICINE

## 2022-03-21 PROCEDURE — 90471 IMMUNIZATION ADMIN: CPT | Performed by: FAMILY MEDICINE

## 2022-03-21 NOTE — PATIENT INSTRUCTIONS
A Healthy Lifestyle: Care Instructions  Your Care Instructions     A healthy lifestyle can help you feel good, stay at a healthy weight, and have plenty of energy for both work and play. A healthy lifestyle is something you can share with your whole family. A healthy lifestyle also can lower your risk for serious health problems, such as high blood pressure, heart disease, and diabetes. You can follow a few steps listed below to improve your health and the health of your family. Follow-up care is a key part of your treatment and safety. Be sure to make and go to all appointments, and call your doctor if you are having problems. It's also a good idea to know your test results and keep a list of the medicines you take. How can you care for yourself at home? · Do not eat too much sugar, fat, or fast foods. You can still have dessert and treats now and then. The goal is moderation. · Start small to improve your eating habits. Pay attention to portion sizes, drink less juice and soda pop, and eat more fruits and vegetables. ? Eat a healthy amount of food. A 3-ounce serving of meat, for example, is about the size of a deck of cards. Fill the rest of your plate with vegetables and whole grains. ? Limit the amount of soda and sports drinks you have every day. Drink more water when you are thirsty. ? Eat plenty of fruits and vegetables every day. Have an apple or some carrot sticks as an afternoon snack instead of a candy bar. Try to have fruits and/or vegetables at every meal.  · Make exercise part of your daily routine. You may want to start with simple activities, such as walking, bicycling, or slow swimming. Try to be active 30 to 60 minutes every day. You do not need to do all 30 to 60 minutes all at once. For example, you can exercise 3 times a day for 10 or 20 minutes.  Moderate exercise is safe for most people, but it is always a good idea to talk to your doctor before starting an exercise program.  · Keep moving. [place] the lawn, work in the garden, or [x+1]. Take the stairs instead of the elevator at work. · If you smoke, quit. People who smoke have an increased risk for heart attack, stroke, cancer, and other lung illnesses. Quitting is hard, but there are ways to boost your chance of quitting tobacco for good. ? Use nicotine gum, patches, or lozenges. ? Ask your doctor about stop-smoking programs and medicines. ? Keep trying. In addition to reducing your risk of diseases in the future, you will notice some benefits soon after you stop using tobacco. If you have shortness of breath or asthma symptoms, they will likely get better within a few weeks after you quit. · Limit how much alcohol you drink. Moderate amounts of alcohol (up to 2 drinks a day for men, 1 drink a day for women) are okay. But drinking too much can lead to liver problems, high blood pressure, and other health problems. Family health  If you have a family, there are many things you can do together to improve your health. · Eat meals together as a family as often as possible. · Eat healthy foods. This includes fruits, vegetables, lean meats and dairy, and whole grains. · Include your family in your fitness plan. Most people think of activities such as jogging or tennis as the way to fitness, but there are many ways you and your family can be more active. Anything that makes you breathe hard and gets your heart pumping is exercise. Here are some tips:  ? Walk to do errands or to take your child to school or the bus.  ? Go for a family bike ride after dinner instead of watching TV. Where can you learn more? Go to http://www.gray.com/  Enter C064 in the search box to learn more about \"A Healthy Lifestyle: Care Instructions. \"  Current as of: June 16, 2021               Content Version: 13.2  © 1674-4765 Healthwise, Incorporated.    Care instructions adapted under license by [facility] (which disclaims liability or warranty for this information). If you have questions about a medical condition or this instruction, always ask your healthcare professional. [place] [address] any warranty or liability for your use of this information. Tdap (Tetanus, Diphtheria, Pertussis) Vaccine: What You Need to Know  Why get vaccinated? Tdap vaccine can prevent tetanus, diphtheria, and pertussis. Diphtheria and pertussis spread from person to person. Tetanus enters the body through cuts or wounds. · TETANUS (T) causes painful stiffening of the muscles. Tetanus can lead to serious health problems, including being unable to open the mouth, having trouble swallowing and breathing, or death. · DIPHTHERIA (D) can lead to difficulty breathing, heart failure, paralysis, or death. · PERTUSSIS (aP), also known as \"whooping cough,\" can cause uncontrollable, violent coughing that makes it hard to breathe, eat, or drink. Pertussis can be extremely serious especially in babies and young children, causing pneumonia, convulsions, brain damage, or death. In teens and adults, it can cause weight loss, loss of bladder control, passing out, and rib fractures from severe coughing. Tdap vaccine  Tdap is only for children 7 years and older, adolescents, and adults. Adolescents should receive a single dose of Tdap, preferably at age 6 or 15 years. Pregnant people should get a dose of Tdap during every pregnancy, preferably during the early part of the third trimester, to help protect the  from pertussis. Infants are most at risk for severe, life-threatening complications from pertussis. Adults who have never received Tdap should get a dose of Tdap. Also, adults should receive a booster dose of either Tdap or Td (a different vaccine that protects against tetanus and diphtheria but not pertussis) every 10 years, or after 5 years in the case of a severe or dirty wound or burn.   Tdap may be given at the same time as other vaccines. Talk with your health care provider  Tell your vaccination provider if the person getting the vaccine:  · Has had an allergic reaction after a previous dose of any vaccine that protects against tetanus, diphtheria, or pertussis, or has any severe, life-threatening allergies  · Has had a coma, decreased level of consciousness, or prolonged seizures within 7 days after a previous dose of any pertussis vaccine (DTP, DTaP, or Tdap)  · Has seizures or another nervous system problem  · Has ever had Guillain-Barré Syndrome (also called \"GBS\")  · Has had severe pain or swelling after a previous dose of any vaccine that protects against tetanus or diphtheria  In some cases, your health care provider may decide to postpone Tdap vaccination until a future visit. People with minor illnesses, such as a cold, may be vaccinated. People who are moderately or severely ill should usually wait until they recover before getting Tdap vaccine. Your health care provider can give you more information. Risks of a vaccine reaction  · Pain, redness, or swelling where the shot was given, mild fever, headache, feeling tired, and nausea, vomiting, diarrhea, or stomachache sometimes happen after Tdap vaccination. People sometimes faint after medical procedures, including vaccination. Tell your provider if you feel dizzy or have vision changes or ringing in the ears. As with any medicine, there is a very remote chance of a vaccine causing a severe allergic reaction, other serious injury, or death. What if there is a serious problem? An allergic reaction could occur after the vaccinated person leaves the clinic. If you see signs of a severe allergic reaction (hives, swelling of the face and throat, difficulty breathing, a fast heartbeat, dizziness, or weakness), call 9-1-1 and get the person to the nearest hospital.  For other signs that concern you, call your health care provider.   Adverse reactions should be reported to the Vaccine Adverse Event Reporting System (VAERS). Your health care provider will usually file this report, or you can do it yourself. Visit the VAERS website at www.vaers. hhs.gov or call 8-912.518.2784. VAERS is only for reporting reactions, and VAERS staff members do not give medical advice. The National Vaccine Injury Compensation Program  The National Vaccine Injury Compensation Program (VICP) is a federal program that was created to compensate people who may have been injured by certain vaccines. Claims regarding alleged injury or death due to vaccination have a time limit for filing, which may be as short as two years. Visit the VICP website at www.[place].gov/vaccinecompensation or call 0-665.367.7028 to learn about the program and about filing a claim. How can I learn more? · Ask your health care provider. · Call your local or state health department. · Visit the website of the Food and Drug Administration (FDA) for vaccine package inserts and additional information at www.fda.gov/vaccines-blood-biologics/vaccines. · Contact the Centers for Disease Control and Prevention (CDC):  ? Call 6-838.618.5241 (1-800-CDC-INFO) or  ? Visit CDC's website at www.cdc.gov/vaccines. Vaccine Information Statement  Tdap (Tetanus, Diphtheria, Pertussis) Vaccine  8/6/2021  [address]inda Draft [id number]  [facility] and [facility] for Disease Control and Prevention  Many vaccine information statements are available in [place] and other languages. See www.immunize.org/vis  Hojas de información sobre vacunas están disponibles en español y en muchos otros idiomas. Visite www.immunize.org/vis  Care instructions adapted under license by [employer] (which disclaims liability or warranty for this information).  If you have questions about a medical condition or this instruction, always ask your healthcare professional. [employer] disclaims any warranty or liability for your use of this information.

## 2022-03-21 NOTE — PROGRESS NOTES
SUBJECTIVE  Chief Complaint   Patient presents with    Other     metabolic disease      He has metabolic disease in the way of hypertension, prediabetes, hyperlipidemia in the setting of obesity. He reports compliance with his current medications without side effects. He is due for an A1c today. He has depression that is well-controlled on Zoloft. He has no side effects. No harmful ideations outwardly expressed. He has had a recent loss - his sister who passed in March from pancreatic cancer. He has GERD controlled on PPIs. He has had colorectal cancer screening updated at the end of 2021. He has sleep apnea and chronic daily headaches controlled on Pamelor. He is on CPAP. He has BPH and a family history of prostate CA. He has had kidney stones as well and sees a urologist.     OBJECTIVE    Blood pressure 134/72, pulse 70, temperature 98.2 °F (36.8 °C), temperature source Temporal, resp. rate 16, height 5' 9\" (1.753 m), weight 230 lb (104.3 kg), SpO2 97 %. General:  Alert, cooperative, well appearing, in no apparent distress. CV:  The heart sounds are regular in rate and rhythm. There is a normal S1 and S2. There or no murmurs  Lungs: Inspiratory and expiratory efforts are full and unlabored. Lung sounds are clear and equal to auscultation throughout all lung fields without wheezing, rales, or rhonchi. Skin:  No rashes, no jaundice. Psych: normal affect. Mood good. Oriented x 3. Judgement and insight intact. Results for orders placed or performed in visit on 03/21/22   AMB POC HEMOGLOBIN A1C   Result Value Ref Range    Hemoglobin A1c (POC) 5.8 %     ASSESSMENT / PLAN    ICD-10-CM ICD-9-CM    1. Prediabetes  R73.03 790.29 AMB POC HEMOGLOBIN A1C      HEMOGLOBIN A1C W/O EAG   2. Essential hypertension with goal blood pressure less than 140/90  I10 401.9 CBC WITH AUTOMATED DIFF      METABOLIC PANEL, COMPREHENSIVE      LIPID PANEL   3.  Hyperlipidemia, unspecified hyperlipidemia type  M57.3 272.4 METABOLIC PANEL, COMPREHENSIVE      LIPID PANEL   4. Obesity with serious comorbidity, unspecified classification, unspecified obesity type  E66.9 278.00    5. Chronic daily headache  R51.9 784.0    6. DB on CPAP  G47.33 327.23     Z99.89 V46.8    7. Benign prostatic hyperplasia, unspecified whether lower urinary tract symptoms present  N40.0 600.00    8. Kidney stone  N20.0 592.0    9. Gastroesophageal reflux disease without esophagitis  K21.9 530.81    10. Polyp of colon, unspecified part of colon, unspecified type  K63.5 211.3    11. Major depressive disorder with single episode, in partial remission ([facility]  F32.4 296.25    12. Encounter for immunization  Z23 V03.89 NM IMMUNIZ ADMIN,1 SINGLE/COMB VAC/TOXOID      TETANUS, DIPHTHERIA TOXOIDS AND ACELLULAR PERTUSSIS VACCINE (TDAP), IN INDIVIDS. >=7, IM     HTN / prediabetes / hyperlipidemia / obesity - cont current care. Diet and exercise. A1c showing stability. Chronic daily HAs / BD - has seen the sleep specialist.  HAs controlled. Refills as needed. BPH / kidney stones - cont per urology. Notes reviewed. Agree with care plan. GERD - cont PPI as needed. Refills as needed. Colon polyps - reviewed latest CRCS. Up to date. Depression - controlled on Zoloft 100mg daily. Refills as needed. Tdap administered. All chart history elements were reviewed by me at the time of the visit even though marked at time of note closure. Patient understands our medical plan. Patient has provided input and agrees with goals. Alternatives have been explained and offered. All questions answered. The patient is to call if condition worsens or fails to improve. Follow-up and Dispositions    · Return in about 6 months (around 9/21/2022) for routine care. Fasting labs due 3-7 days prior to appointment.

## 2022-03-22 RX ORDER — ERGOCALCIFEROL 1.25 MG/1
CAPSULE ORAL
Qty: 13 CAPSULE | Refills: 3 | Status: SHIPPED | OUTPATIENT
Start: 2022-03-22

## 2022-04-07 ENCOUNTER — OFFICE VISIT (OUTPATIENT)
Dept: CARDIOLOGY CLINIC | Age: 57
End: 2022-04-07
Payer: COMMERCIAL

## 2022-04-07 VITALS
OXYGEN SATURATION: 97 % | HEART RATE: 74 BPM | BODY MASS INDEX: 33.47 KG/M2 | SYSTOLIC BLOOD PRESSURE: 118 MMHG | HEIGHT: 69 IN | WEIGHT: 226 LBS | DIASTOLIC BLOOD PRESSURE: 78 MMHG

## 2022-04-07 DIAGNOSIS — Z99.89 OSA ON CPAP: ICD-10-CM

## 2022-04-07 DIAGNOSIS — I10 ESSENTIAL HYPERTENSION WITH GOAL BLOOD PRESSURE LESS THAN 140/90: Primary | ICD-10-CM

## 2022-04-07 DIAGNOSIS — E78.5 HYPERLIPIDEMIA, UNSPECIFIED HYPERLIPIDEMIA TYPE: ICD-10-CM

## 2022-04-07 DIAGNOSIS — I25.111 CORONARY ARTERY DISEASE INVOLVING NATIVE CORONARY ARTERY OF NATIVE HEART WITH ANGINA PECTORIS WITH DOCUMENTED SPASM (HCC): ICD-10-CM

## 2022-04-07 DIAGNOSIS — R55 VASOVAGAL SYNCOPE: ICD-10-CM

## 2022-04-07 DIAGNOSIS — G47.33 OSA ON CPAP: ICD-10-CM

## 2022-04-07 DIAGNOSIS — I25.751 CORONARY ARTERY DISEASE INVOLVING NATIVE ARTERY OF TRANSPLANTED HEART WITH ANGINA PECTORIS WITH DOCUMENTED SPASM (HCC): ICD-10-CM

## 2022-04-07 PROCEDURE — 93000 ELECTROCARDIOGRAM COMPLETE: CPT | Performed by: NURSE PRACTITIONER

## 2022-04-07 PROCEDURE — 99214 OFFICE O/P EST MOD 30 MIN: CPT | Performed by: NURSE PRACTITIONER

## 2022-04-07 NOTE — PROGRESS NOTES
1. Have you been to the ER, urgent care clinic since your last visit? Hospitalized since your last visit? No    2. Have you seen or consulted any other health care providers outside of the [address] [place] [address] since your last visit? Include any pap smears or colon screening. No     3. Do you need any refills today?   no

## 2022-04-07 NOTE — PATIENT INSTRUCTIONS
Heart-Healthy Diet: Care Instructions  Your Care Instructions     A heart-healthy diet has lots of vegetables, fruits, nuts, beans, and whole grains, and is low in salt. It limits foods that are high in saturated fat, such as meats, cheeses, and fried foods. It may be hard to change your diet, but even small changes can lower your risk of heart attack and heart disease. Follow-up care is a key part of your treatment and safety. Be sure to make and go to all appointments, and call your doctor if you are having problems. It's also a good idea to know your test results and keep a list of the medicines you take. How can you care for yourself at home? Watch your portions  · Use food labels to learn what the recommended servings are for the foods you eat. · Eat only the number of calories you need to stay at a healthy weight. If you need to lose weight, eat fewer calories than your body burns (through exercise and other physical activity). Eat more fruits and vegetables  · Eat a variety of fruit and vegetables every day. Dark green, deep orange, red, or yellow fruits and vegetables are especially good for you. Examples include spinach, carrots, peaches, and berries. · Keep carrots, celery, and other veggies handy for snacks. Buy fruit that is in season and store it where you can see it so that you will be tempted to eat it. · Cook dishes that have a lot of veggies in them, such as stir-fries and soups. Limit saturated fat  · Read food labels, and try to avoid saturated fats. They increase your risk of heart disease. · Use olive or canola oil when you cook. · Bake, broil, grill, or steam foods instead of frying them. · Choose lean meats instead of high-fat meats such as hot dogs and sausages. Cut off all visible fat when you prepare meat. · Eat fish, skinless poultry, and meat alternatives such as soy products instead of high-fat meats.  Soy products, such as tofu, may be especially good for your heart.  · Choose low-fat or fat-free milk and dairy products. Eat foods high in fiber  · Eat a variety of grain products every day. Include whole-grain foods that have lots of fiber and nutrients. Examples of whole-grain foods include oats, whole wheat bread, and brown rice. · Buy whole-grain breads and cereals, instead of white bread or pastries. Limit salt and sodium  · Limit how much salt and sodium you eat to help lower your blood pressure. · Taste food before you salt it. Add only a little salt when you think you need it. With time, your taste buds will adjust to less salt. · Eat fewer snack items, fast foods, and other high-salt, processed foods. Check food labels for the amount of sodium in packaged foods. · Choose low-sodium versions of canned goods (such as soups, vegetables, and beans). Limit sugar  · Limit drinks and foods with added sugar. These include candy, desserts, and soda pop. Limit alcohol  · Limit alcohol to no more than 2 drinks a day for men and 1 drink a day for women. Too much alcohol can cause health problems. When should you call for help? Watch closely for changes in your health, and be sure to contact your doctor if:    · You would like help planning heart-healthy meals. Where can you learn more? Go to http://www.negron.com/  Enter V137 in the search box to learn more about \"Heart-Healthy Diet: Care Instructions. \"  Current as of: September 8, 2021               Content Version: 13.2  © 2006-2022 Healthwise, Incorporated. Care instructions adapted under license by [employer] (which disclaims liability or warranty for this information). If you have questions about a medical condition or this instruction, always ask your healthcare professional. [place] [id number] any warranty or liability for your use of this information.

## 2022-04-07 NOTE — PROGRESS NOTES
HISTORY OF PRESENT ILLNESS  Jessica Bright is a 60 y.o. male. Patient with cad,coronary spasm,htn  On follow up patient denies any sob, palpitation or other significant symptoms. 2/2020  Seen for follow-up after recent admission. Episode of syncope started while he was standing in line for longer time started with dizziness subsequent syncope. Occasional lightheaded when standing in 1 position. He had positive tilt table in 2013. No recent syncopal episodes. 1/2021  Patient seen today for follow-up. Complains of chest tightness on the left side. Under significant stress at present. Also feels malaise and fatigue. 4/2022  Patient with h/o coronary spasm, seen today for complaints of chest discomfort with shortness of breath. This has been ongoing x 2 weeks - worse with physical activity and resolves with rest or nitroglycerin. He reports ongoing rare palpitations, denies edema. Hypertension  The history is provided by the patient. This is a chronic problem. The problem occurs constantly. The problem has not changed since onset. Associated symptoms include chest pain and shortness of breath. Cholesterol Problem  The history is provided by the patient. This is a chronic problem. The problem occurs constantly. The problem has not changed since onset. Associated symptoms include chest pain and shortness of breath. Chest Pain (Angina)   The history is provided by the patient. This is a recurrent problem. The problem has been gradually worsening. The problem occurs every several days. The pain is associated with exertion and rest. The pain is present in the substernal region. The pain is mild. The quality of the pain is described as pressure-like. The pain does not radiate. Associated symptoms include palpitations and shortness of breath.  Pertinent negatives include no claudication, no cough, no dizziness, no fever, no hemoptysis, no nausea, no orthopnea, no PND, no sputum production, no vomiting and no weakness. He has tried nitroglycerin for the symptoms. Review of Systems   Constitutional: Negative for chills and fever. HENT: Negative for nosebleeds. Eyes: Negative for blurred vision and double vision. Respiratory: Positive for shortness of breath. Negative for cough, hemoptysis, sputum production and wheezing. Cardiovascular: Positive for chest pain and palpitations. Negative for orthopnea, claudication, leg swelling and PND. Gastrointestinal: Negative for heartburn, nausea and vomiting. Musculoskeletal: Negative for myalgias. Skin: Negative for rash. Neurological: Negative for dizziness and weakness. Endo/Heme/Allergies: Does not bruise/bleed easily.      Family History   Problem Relation Age of Onset    Hypertension Mother     Diabetes Mother     Hypertension Father     Cancer Father         Prostate    Heart Disease Father     Heart Attack Father     Stroke Brother     Migraines Brother     Cancer Sister         Breast    Colon Polyps Sister     Pancreatic Cancer Sister    [facility] Migraines Sister     Stroke Brother        Past Medical History:   Diagnosis Date    Abnormal tilt table test 2013    Back pain     Back pain     CAD (coronary artery disease)     Coronary artery spasm ([address]     Difficult intubation     15 years ago during thumb sx, no problem since then    GERD (gastroesophageal reflux disease)     Glaucoma     Hearing loss 2021    has an audiologist, has hearing aids    Hypertension     Kidney stone     Migraine     migraines    Prediabetes     S/P colonoscopic polypectomy 09/2016, 12/1/2021    Sleep apnea     uses cpap    Syncope     has had recurrent issues, has had work-up to include EEG, monitor, tilt table, Carotids, ECHO       Past Surgical History:   Procedure Laterality Date    COLONOSCOPY N/A 9/30/2016    COLONOSCOPY with biopsy [facility] Audie Esquivel [facility] ENDOSCOPY    COLONOSCOPY N/A 12/1/2021    COLONOSCOPY performed by Phoebe Schofield MD at [facility] ENDOSCOPY    HX BACK SURGERY      x3    HX COLONOSCOPY  09/2016    Dr. Linda Small      reportedly normal by patient    HX HEENT      sinus sx    HX KNEE REPLACEMENT Right 08/13/2020    Dr Ozzy Avila      left shoulder x 3, right knee, left thumb, left great toe sx    HX ORTHOPAEDIC Right     elbow    HX UROLOGICAL      Lithotripsy    AL CARDIAC SURG PROCEDURE UNLIST      cardiac catheterization times 5       Social History     Tobacco Use    Smoking status: Never Smoker    Smokeless tobacco: Never Used   Substance Use Topics    Alcohol use: Yes     Comment: rare use       No Known Allergies        Visit Vitals  /78 (BP 1 Location: Left upper arm, BP Patient Position: Sitting, BP Cuff Size: Adult)   Pulse 74   Ht 5' 9\" (1.753 m)   Wt 102.5 kg (226 lb)   SpO2 97%   BMI 33.37 kg/m²         Physical Exam  Vitals and nursing note reviewed. Constitutional:       Appearance: He is well-developed. HENT:      Head: Normocephalic and atraumatic. Eyes:      Conjunctiva/sclera: Conjunctivae normal.   Neck:      Thyroid: No thyromegaly. Vascular: No JVD. Trachea: No tracheal deviation. Cardiovascular:      Rate and Rhythm: Normal rate and regular rhythm. Heart sounds: Normal heart sounds. No murmur heard. No friction rub. No gallop. Pulmonary:      Effort: No respiratory distress. Breath sounds: Normal breath sounds. No wheezing or rales. Chest:      Chest wall: No tenderness. Abdominal:      Palpations: Abdomen is soft. Tenderness: There is no abdominal tenderness. Musculoskeletal:      Cervical back: Neck supple. Right lower leg: No edema. Left lower leg: No edema. Skin:     General: Skin is warm and dry. Neurological:      Mental Status: He is alert and oriented to person, place, and time. Mr. Tayla Brewster has a reminder for a \"due or due soon\" health maintenance.  I have asked that he contact his primary care provider for follow-up on this health maintenance. Cath-2004  rca spasm-  lcx 30-40%  Stress test-2015  Fixed ant defect  Echo-2015  Normal lvef  ekg-7/2016-  wnl  I Have personally reviewed recent relevant labs available and discussed with patient  5/2019-BMP, LFT, lipids. LDL-49    Echo Cardiogram Complete2/3/2020  [address]  Component Name Value Ref Range   EF Echo 60     Result Impression   :   NORMAL LEFT VENTRICULAR CAVITY SIZE AND SYSTOLIC FUNCTION WITH AN EJECTION FRACTION OF  60 %. ABNORMAL DIASTOLIC FILLING PATTERN. MITRAL ANNULAR CALCIFICATION WITH MILD MITRAL REGURGITATION. SCLEROTIC TRILEAFLET AORTIC VALVE WITHOUT EVIDENCE OF STENOSIS. TRACE OF TRICUSPID REGURGITATION WITH A RVSP OF 38 MMHG. STRUCTURALLY NORMAL PULMONIC VALVE WITH TRACE PULMONIC REGURGITATION. NO EVIDENCE OF PERICARDIAL EFFUSION. NO MASSES, SHUNTS OR THROMBI SEEN. NO PREVIOUS REPORT FOR COMPARISON. 2/2020- carotid artery PVL  Conclusions: Normal extracranial carotid and vertebral duplex examination. I Have personally reviewed recent relevant labs available and discussed with patient  2/2021    Assessment         ICD-10-CM ICD-9-CM    1. Essential hypertension with goal blood pressure less than 140/90  I10 401.9 AMB POC EKG ROUTINE W/ 12 LEADS, INTER & REP    Stable monitor   2. Coronary artery disease involving native coronary artery of native heart with angina pectoris with documented spasm (HCC)  I25.111 414.01 NUCLEAR CARDIAC STRESS TEST     413.9 ECHO ADULT COMPLETE    Stable symptoms continue medical management monitor   3. DB on CPAP  G47.33 327.23     Z99.89 V46.8     continue treatment   4. Hyperlipidemia, unspecified hyperlipidemia type  E78.5 272.4     Continue treatment lab with PCP   5. Vasovagal syncope  R55 780.2     Stable monitor   6.  Coronary artery disease involving native artery of transplanted heart with angina pectoris with documented spasm (HCC)  I25.751 414.06 413.9     evaluated chest pain and dyspnea with stress test and echo   Had tried cardizem in past -had drop in bp  No acei/normal lvef  2/2019  Angina significantly improved on addition of Imdur. Blood pressure controlled continue medical management  In 2019  Cardiac status stable. Angina stable. He has orthostatic dizziness. Likely from neuropathy. Will use support stocking. Consider additional medication if required if symptoms are persistent  2/2020  Recent syncope clinically vasovagal.  History of positive tilt table in 2013. Will discontinue amlodipine and monitor as blood pressure on low normal side. Monitor for angina and spasm. Continue with support stocking. Pressure maneuvers and if needed midodrine  7/2020  Cardiac status stable. Okay to proceed with knee surgery with medium cardiac risk. No recurrence of syncope or angina  1/2021  Recent increase in chest tightness. Currently takes Imdur 60 mg increased amlodipine to 10 mg a day. Monitor blood pressure. Ordered lab including TSH LFT and lipids and BMP  5/2021  Episodes of palpitation and tachycardia noted. We will change amlodipine to diltiazem. Continue other treatment and monitor. Blood pressure starts elevating might need a combination of diltiazem and amlodipine  6/2021  Cardiac status stable. Blood pressure much better controlled. Heart rate better. Palpitations are better continue treatment  11/2021  Stable cardiac status. Intermittent use of nitroglycerin. Continue current medical management  4/2022  Patient seen with c/o increased episodes of chest pain with dyspnea with worsening exercise tolerance. He is using SL nitroglycerin more often. Will evaluate with stress test and echo. B/P controlled. EKG SR with T wave abnormalities. Continue current medications. There are no discontinued medications.     Orders Placed This Encounter    AMB POC EKG ROUTINE W/ 12 LEADS, INTER & REP     Order Specific Question:   Reason for Exam:     Answer:   htn    ECHO ADULT COMPLETE     Standing Status:   Future     Standing Expiration Date:   4/7/2023     Order Specific Question:   Contrast Enhancement (Bubble Study, Definity, Optison) may be used if criteria listed in established evidence-based protocol has been identified. Answer:   Yes     Order Specific Question:   Enhanced Imaging (Myocardial Strain, 3D post-processing) may be used if criteria listed in established evidence-based protocol has been identified. Answer:   Yes       Follow-up and Dispositions    · Return in about 2 weeks (around 4/21/2022) for Follow up with Dr. Thai Almanza

## 2022-04-22 ENCOUNTER — OFFICE VISIT (OUTPATIENT)
Dept: CARDIOLOGY CLINIC | Age: 57
End: 2022-04-22
Payer: COMMERCIAL

## 2022-04-22 VITALS
DIASTOLIC BLOOD PRESSURE: 80 MMHG | BODY MASS INDEX: 33.18 KG/M2 | WEIGHT: 224 LBS | HEIGHT: 69 IN | SYSTOLIC BLOOD PRESSURE: 124 MMHG | HEART RATE: 73 BPM

## 2022-04-22 DIAGNOSIS — I10 ESSENTIAL HYPERTENSION WITH GOAL BLOOD PRESSURE LESS THAN 140/90: ICD-10-CM

## 2022-04-22 DIAGNOSIS — Z99.89 OSA ON CPAP: ICD-10-CM

## 2022-04-22 DIAGNOSIS — E78.5 HYPERLIPIDEMIA, UNSPECIFIED HYPERLIPIDEMIA TYPE: ICD-10-CM

## 2022-04-22 DIAGNOSIS — G47.33 OSA ON CPAP: ICD-10-CM

## 2022-04-22 DIAGNOSIS — I25.111 CORONARY ARTERY DISEASE INVOLVING NATIVE CORONARY ARTERY OF NATIVE HEART WITH ANGINA PECTORIS WITH DOCUMENTED SPASM (HCC): Primary | ICD-10-CM

## 2022-04-22 PROCEDURE — 99214 OFFICE O/P EST MOD 30 MIN: CPT | Performed by: INTERNAL MEDICINE

## 2022-04-22 NOTE — PROGRESS NOTES
1. Have you been to the ER, urgent care clinic since your last visit? Hospitalized since your last visit? No    2. Have you seen or consulted any other health care providers outside of the [address] [place] [address] since your last visit? Include any pap smears or colon screening. No     3. Do you need any refills today?   no

## 2022-04-22 NOTE — H&P (VIEW-ONLY)
HISTORY OF PRESENT ILLNESS  Rafia Nuñez is a 60 y.o. male. Patient with cad,coronary spasm,htn  On follow up patient denies any sob, palpitation or other significant symptoms. 2/2020  Seen for follow-up after recent admission. Episode of syncope started while he was standing in line for longer time started with dizziness subsequent syncope. Occasional lightheaded when standing in 1 position. He had positive tilt table in 2013. No recent syncopal episodes. 1/2021  Patient seen today for follow-up. Complains of chest tightness on the left side. Under significant stress at present. Also feels malaise and fatigue. 4/2022  Patient with h/o coronary spasm, seen today for complaints of chest discomfort with shortness of breath. This has been ongoing x 2 weeks - worse with physical activity and resolves with rest or nitroglycerin. He reports ongoing rare palpitations, denies edema. Hypertension  The history is provided by the patient. This is a chronic problem. The problem occurs constantly. The problem has not changed since onset. Associated symptoms include chest pain and shortness of breath. Cholesterol Problem  The history is provided by the patient. This is a chronic problem. The problem occurs constantly. The problem has not changed since onset. Associated symptoms include chest pain and shortness of breath. Chest Pain (Angina)   The history is provided by the patient. This is a recurrent problem. The problem has been gradually worsening. The problem occurs every several days. The pain is associated with exertion and rest. The pain is present in the substernal region. The pain is mild. The quality of the pain is described as pressure-like. The pain does not radiate. Associated symptoms include palpitations and shortness of breath.  Pertinent negatives include no claudication, no cough, no dizziness, no fever, no hemoptysis, no nausea, no orthopnea, no PND, no sputum production, no vomiting and no weakness. He has tried nitroglycerin for the symptoms. Follow-up  Associated symptoms include chest pain and shortness of breath. Review of Systems   Constitutional: Negative for chills and fever. HENT: Negative for nosebleeds. Eyes: Negative for blurred vision and double vision. Respiratory: Positive for shortness of breath. Negative for cough, hemoptysis, sputum production and wheezing. Cardiovascular: Positive for chest pain and palpitations. Negative for orthopnea, claudication, leg swelling and PND. Gastrointestinal: Negative for heartburn, nausea and vomiting. Musculoskeletal: Negative for myalgias. Skin: Negative for rash. Neurological: Negative for dizziness and weakness. Endo/Heme/Allergies: Does not bruise/bleed easily.      Family History   Problem Relation Age of Onset    Hypertension Mother     Diabetes Mother     Hypertension Father     Cancer Father         Prostate    Heart Disease Father     Heart Attack Father     Stroke Brother     Migraines Brother     Cancer Sister         Breast    Colon Polyps Sister     Pancreatic Cancer Sister    Cynthia Manual Migraines Sister     Stroke Brother        Past Medical History:   Diagnosis Date    Abnormal tilt table test 2013    Back pain     Back pain     CAD (coronary artery disease)     Coronary artery spasm ([address]     Difficult intubation     15 years ago during thumb sx, no problem since then    GERD (gastroesophageal reflux disease)     Glaucoma     Hearing loss 2021    has an audiologist, has hearing aids    Hypertension     Kidney stone     Migraine     migraines    Prediabetes     S/P colonoscopic polypectomy 09/2016, 12/1/2021    Sleep apnea     uses cpap    Syncope     has had recurrent issues, has had work-up to include EEG, monitor, tilt table, Carotids, ECHO       Past Surgical History:   Procedure Laterality Date    COLONOSCOPY N/A 9/30/2016    COLONOSCOPY with biopsy performed by Melinda Ceja MD at [facility] ENDOSCOPY    COLONOSCOPY N/A 12/1/2021    COLONOSCOPY performed by Roderick White MD at [address]      x3    HX COLONOSCOPY  09/2016    Dr. Alejandro Thornton      reportedly normal by patient    HX HEENT      sinus sx    HX KNEE REPLACEMENT Right 08/13/2020    Dr Alison Roberto      left shoulder x 3, right knee, left thumb, left great toe sx    HX ORTHOPAEDIC Right     elbow    HX UROLOGICAL      Lithotripsy    HI CARDIAC SURG PROCEDURE UNLIST      cardiac catheterization times 5       Social History     Tobacco Use    Smoking status: Never Smoker    Smokeless tobacco: Never Used   Substance Use Topics    Alcohol use: Yes     Comment: rare use       No Known Allergies        Visit Vitals  /80 (BP 1 Location: Left upper arm, BP Patient Position: Sitting, BP Cuff Size: Large adult)   Pulse 73   Ht 5' 9\" (1.753 m)   Wt 101.6 kg (224 lb)   BMI 33.08 kg/m²         Physical Exam  Vitals and nursing note reviewed. Constitutional:       Appearance: He is well-developed. HENT:      Head: Normocephalic and atraumatic. Eyes:      Conjunctiva/sclera: Conjunctivae normal.   Neck:      Thyroid: No thyromegaly. Vascular: No JVD. Trachea: No tracheal deviation. Cardiovascular:      Rate and Rhythm: Normal rate and regular rhythm. Heart sounds: Normal heart sounds. No murmur heard. No friction rub. No gallop. Pulmonary:      Effort: No respiratory distress. Breath sounds: Normal breath sounds. No wheezing or rales. Chest:      Chest wall: No tenderness. Abdominal:      Palpations: Abdomen is soft. Tenderness: There is no abdominal tenderness. Musculoskeletal:      Cervical back: Neck supple. Right lower leg: No edema. Left lower leg: No edema. Skin:     General: Skin is warm and dry. Neurological:      Mental Status: He is alert and oriented to person, place, and time.          Mr. Jamal Lew has a reminder for a \"due or due soon\" health maintenance. I have asked that he contact his primary care provider for follow-up on this health maintenance. Cath-2004  rca spasm-  lcx 30-40%  Stress test-2015  Fixed ant defect  Echo-2015  Normal lvef  ekg-7/2016-  wnl  I Have personally reviewed recent relevant labs available and discussed with patient  5/2019BMP, LFT, lipids. REU55    Echo Cardiogram Complete2/3/2020  [address]  Component Name Value Ref Range   EF Echo 60     Result Impression   :   NORMAL LEFT VENTRICULAR CAVITY SIZE AND SYSTOLIC FUNCTION WITH AN EJECTION FRACTION OF  60 %. ABNORMAL DIASTOLIC FILLING PATTERN. MITRAL ANNULAR CALCIFICATION WITH MILD MITRAL REGURGITATION. SCLEROTIC TRILEAFLET AORTIC VALVE WITHOUT EVIDENCE OF STENOSIS. TRACE OF TRICUSPID REGURGITATION WITH A RVSP OF 38 MMHG. STRUCTURALLY NORMAL PULMONIC VALVE WITH TRACE PULMONIC REGURGITATION. NO EVIDENCE OF PERICARDIAL EFFUSION. NO MASSES, SHUNTS OR THROMBI SEEN. NO PREVIOUS REPORT FOR COMPARISON. 2/2020 carotid artery PVL  Conclusions: Normal extracranial carotid and vertebral duplex examination. I Have personally reviewed recent relevant labs available and discussed with patient  2/2021  Darby Pisano MD [facility], [facility], Test Supervisor 4/14/2022       Interpretation Summary         Nuclear Findings: Normal pharmacological myocardial perfusion study. LVEF measures 53%.   Nuclear Findings: LVEF measures 53%. LV perfusion is normal.    Nuclear Findings: Normal left ventricular systolic function post-stress. LVEF measures 53%.   ECG: Resting ECG demonstrates normal sinus rhythm.   ECG: Stress ECG was negative for ischemia.   Stress Test: A pharmacological stress test was performed using lexiscan. Hemodynamics are adequate for diagnosis. Blood pressure demonstrated a normal response and heart rate demonstrated a blunted response to stress.  The patient's heart rate recovery was normal. The patient reported no symptoms during the stress test.       Interpretation Summary 4/2022         Left Ventricle: Normal left ventricular systolic function with a visually estimated EF of 55 - 60%. Left ventricle size is normal. Mildly increased wall thickness. Findings consistent with mild concentric hypertrophy. Normal wall motion. Diastolic dysfunction present with normal LV EF.   Mitral Valve: Mildly thickened leaflet. Mild annular calcification of the mitral valve. Mild regurgitation.   Left Atrium: Left atrium is mildly dilated. LA Vol Index A/L is 38 mL/m2. Assessment         ICD-10-CM ICD-9-CM    1. Coronary artery disease involving native coronary artery of native heart with angina pectoris with documented spasm ([facility])  I25.111 414.01 CBC WITH AUTOMATED DIFF     730.8 METABOLIC PANEL, BASIC      PROTHROMBIN TIME + INR      CASE REQUEST CATH LAB    Progressive increasing chest pain. Class III angina multiple nitroglycerin use. We will proceed with invasive cardiac work-up   2. Essential hypertension with goal blood pressure less than 140/90  I10 401.9     Stable monitor   3. DB on CPAP  G47.33 327.23     Z99.89 V46.8     Stable   4. Hyperlipidemia, unspecified hyperlipidemia type  E78.5 272.4 LIPID PANEL      HEPATIC FUNCTION PANEL    Continue treatment lab with PCP   Had tried cardizem in past -had drop in bp  No acei/normal lvef  2/2019  Angina significantly improved on addition of Imdur. Blood pressure controlled continue medical management  In 2019  Cardiac status stable. Angina stable. He has orthostatic dizziness. Likely from neuropathy. Will use support stocking. Consider additional medication if required if symptoms are persistent  2/2020  Recent syncope clinically vasovagal.  History of positive tilt table in 2013. Will discontinue amlodipine and monitor as blood pressure on low normal side. Monitor for angina and spasm. Continue with support stocking.   Pressure maneuvers and if needed midodrine  7/2020  Cardiac status stable. Okay to proceed with knee surgery with medium cardiac risk. No recurrence of syncope or angina  1/2021  Recent increase in chest tightness. Currently takes Imdur 60 mg increased amlodipine to 10 mg a day. Monitor blood pressure. Ordered lab including TSH LFT and lipids and BMP  5/2021  Episodes of palpitation and tachycardia noted. We will change amlodipine to diltiazem. Continue other treatment and monitor. Blood pressure starts elevating might need a combination of diltiazem and amlodipine  6/2021  Cardiac status stable. Blood pressure much better controlled. Heart rate better. Palpitations are better continue treatment  11/2021  Stable cardiac status. Intermittent use of nitroglycerin. Continue current medical management  4/2022  Patient seen with c/o increased episodes of chest pain with dyspnea with worsening exercise tolerance. He is using SL nitroglycerin more often. Will evaluate with stress test and echo. B/P controlled. EKG SR with T wave abnormalities. Continue current medications. 4/22/2022  Continues to have progression anginal pain requiring multiple nitroglycerin. Class III. Even though stress test is negative, concerned about progressive CAD and ischemia. We will proceed with cardiac cath  THE PATIENT UNDERSTANDS THAT ALTHOUGH RARE, SEVERE  UNEXPECTED COMPLICATIONS CAN OCCUR WITH EACH TYPE OF CARDIAC CATH PROCEDURE. THESE RISKS INCLUDE,  BUT ARE NOT LIMITED TO: ALLERGIC REACTION, INFECTION, BLEEDING, BLOOD VESSEL INJURY,   KIDNEY INJURY FROM X-RAY DYE, PUNCTURE OF THE HEART/LUNGS,   EMERGENT OPEN HEART SURGERY, HEART ATTACK, STROKE, CARDIAC  ARREST OR DEATH OR NEED FOR EMERGENCY CARDIAC BYPASS SURGERY       There are no discontinued medications.     Orders Placed This Encounter    CBC WITH AUTOMATED DIFF     Standing Status:   Future     Standing Expiration Date:   5/47/5581    METABOLIC PANEL, BASIC     Standing Status:   Future Standing Expiration Date:   5/22/2022    PROTHROMBIN TIME + INR     Standing Status:   Future     Standing Expiration Date:   5/22/2022    LIPID PANEL     Standing Status:   Future     Standing Expiration Date:   10/22/2022    HEPATIC FUNCTION PANEL     Standing Status:   Future     Standing Expiration Date:   10/22/2022       Follow-up and Dispositions    · Return in about 4 weeks (around 5/20/2022).

## 2022-04-22 NOTE — PROGRESS NOTES
HISTORY OF PRESENT ILLNESS  Jayesh Madrigal is a 60 y.o. male. Patient with cad,coronary spasm,htn  On follow up patient denies any sob, palpitation or other significant symptoms. 2/2020  Seen for follow-up after recent admission. Episode of syncope started while he was standing in line for longer time started with dizziness subsequent syncope. Occasional lightheaded when standing in 1 position. He had positive tilt table in 2013. No recent syncopal episodes. 1/2021  Patient seen today for follow-up. Complains of chest tightness on the left side. Under significant stress at present. Also feels malaise and fatigue. 4/2022  Patient with h/o coronary spasm, seen today for complaints of chest discomfort with shortness of breath. This has been ongoing x 2 weeks - worse with physical activity and resolves with rest or nitroglycerin. He reports ongoing rare palpitations, denies edema. Hypertension  The history is provided by the patient. This is a chronic problem. The problem occurs constantly. The problem has not changed since onset. Associated symptoms include chest pain and shortness of breath. Cholesterol Problem  The history is provided by the patient. This is a chronic problem. The problem occurs constantly. The problem has not changed since onset. Associated symptoms include chest pain and shortness of breath. Chest Pain (Angina)   The history is provided by the patient. This is a recurrent problem. The problem has been gradually worsening. The problem occurs every several days. The pain is associated with exertion and rest. The pain is present in the substernal region. The pain is mild. The quality of the pain is described as pressure-like. The pain does not radiate. Associated symptoms include palpitations and shortness of breath.  Pertinent negatives include no claudication, no cough, no dizziness, no fever, no hemoptysis, no nausea, no orthopnea, no PND, no sputum production, no vomiting and no weakness. He has tried nitroglycerin for the symptoms. Follow-up  Associated symptoms include chest pain and shortness of breath. Review of Systems   Constitutional: Negative for chills and fever. HENT: Negative for nosebleeds. Eyes: Negative for blurred vision and double vision. Respiratory: Positive for shortness of breath. Negative for cough, hemoptysis, sputum production and wheezing. Cardiovascular: Positive for chest pain and palpitations. Negative for orthopnea, claudication, leg swelling and PND. Gastrointestinal: Negative for heartburn, nausea and vomiting. Musculoskeletal: Negative for myalgias. Skin: Negative for rash. Neurological: Negative for dizziness and weakness. Endo/Heme/Allergies: Does not bruise/bleed easily.      Family History   Problem Relation Age of Onset    Hypertension Mother     Diabetes Mother     Hypertension Father     Cancer Father         Prostate    Heart Disease Father     Heart Attack Father     Stroke Brother     Migraines Brother     Cancer Sister         Breast    Colon Polyps Sister     Pancreatic Cancer Sister    [facility] Migraines Sister     Stroke Brother        Past Medical History:   Diagnosis Date    Abnormal tilt table test 2013    Back pain     Back pain     CAD (coronary artery disease)     Coronary artery spasm ([address]     Difficult intubation     15 years ago during thumb sx, no problem since then    GERD (gastroesophageal reflux disease)     Glaucoma     Hearing loss 2021    has an audiologist, has hearing aids    Hypertension     Kidney stone     Migraine     migraines    Prediabetes     S/P colonoscopic polypectomy 09/2016, 12/1/2021    Sleep apnea     uses cpap    Syncope     has had recurrent issues, has had work-up to include EEG, monitor, tilt table, Carotids, ECHO       Past Surgical History:   Procedure Laterality Date    COLONOSCOPY N/A 9/30/2016    COLONOSCOPY with biopsy performed by Prem Puckett MD at [facility] ENDOSCOPY    COLONOSCOPY N/A 12/1/2021    COLONOSCOPY performed by Tyna Phoenix, MD at [address]      x3    HX COLONOSCOPY  09/2016    Dr. Sanches Pollen      reportedly normal by patient    HX HEENT      sinus sx    HX KNEE REPLACEMENT Right 08/13/2020    Dr Amari Katz      left shoulder x 3, right knee, left thumb, left great toe sx    HX ORTHOPAEDIC Right     elbow    HX UROLOGICAL      Lithotripsy    TX CARDIAC SURG PROCEDURE UNLIST      cardiac catheterization times 5       Social History     Tobacco Use    Smoking status: Never Smoker    Smokeless tobacco: Never Used   Substance Use Topics    Alcohol use: Yes     Comment: rare use       No Known Allergies        Visit Vitals  /80 (BP 1 Location: Left upper arm, BP Patient Position: Sitting, BP Cuff Size: Large adult)   Pulse 73   Ht 5' 9\" (1.753 m)   Wt 101.6 kg (224 lb)   BMI 33.08 kg/m²         Physical Exam  Vitals and nursing note reviewed. Constitutional:       Appearance: He is well-developed. HENT:      Head: Normocephalic and atraumatic. Eyes:      Conjunctiva/sclera: Conjunctivae normal.   Neck:      Thyroid: No thyromegaly. Vascular: No JVD. Trachea: No tracheal deviation. Cardiovascular:      Rate and Rhythm: Normal rate and regular rhythm. Heart sounds: Normal heart sounds. No murmur heard. No friction rub. No gallop. Pulmonary:      Effort: No respiratory distress. Breath sounds: Normal breath sounds. No wheezing or rales. Chest:      Chest wall: No tenderness. Abdominal:      Palpations: Abdomen is soft. Tenderness: There is no abdominal tenderness. Musculoskeletal:      Cervical back: Neck supple. Right lower leg: No edema. Left lower leg: No edema. Skin:     General: Skin is warm and dry. Neurological:      Mental Status: He is alert and oriented to person, place, and time.          Mr. Kevin Parr has a reminder for a \"due or due soon\" health maintenance. I have asked that he contact his primary care provider for follow-up on this health maintenance. Cath-2004  rca spasm-  lcx 30-40%  Stress test-2015  Fixed ant defect  Echo-2015  Normal lvef  ekg-7/2016-  wnl  I Have personally reviewed recent relevant labs available and discussed with patient  5/2019-BMP, LFT, lipids. LDL-49    Echo Cardiogram Complete2/3/2020  [address]  Component Name Value Ref Range   EF Echo 60     Result Impression   :   NORMAL LEFT VENTRICULAR CAVITY SIZE AND SYSTOLIC FUNCTION WITH AN EJECTION FRACTION OF  60 %. ABNORMAL DIASTOLIC FILLING PATTERN. MITRAL ANNULAR CALCIFICATION WITH MILD MITRAL REGURGITATION. SCLEROTIC TRILEAFLET AORTIC VALVE WITHOUT EVIDENCE OF STENOSIS. TRACE OF TRICUSPID REGURGITATION WITH A RVSP OF 38 MMHG. STRUCTURALLY NORMAL PULMONIC VALVE WITH TRACE PULMONIC REGURGITATION. NO EVIDENCE OF PERICARDIAL EFFUSION. NO MASSES, SHUNTS OR THROMBI SEEN. NO PREVIOUS REPORT FOR COMPARISON. 2/2020- carotid artery PVL  Conclusions: Normal extracranial carotid and vertebral duplex examination. I Have personally reviewed recent relevant labs available and discussed with patient  2/2021  Enrique Bedoya MD ECG [place], SPECT [place], Test Supervisor 4/14/2022       Interpretation Summary         Nuclear Findings: Normal pharmacological myocardial perfusion study. LVEF measures 53%.   Nuclear Findings: LVEF measures 53%. LV perfusion is normal.    Nuclear Findings: Normal left ventricular systolic function post-stress. LVEF measures 53%.   ECG: Resting ECG demonstrates normal sinus rhythm.   ECG: Stress ECG was negative for ischemia.   Stress Test: A pharmacological stress test was performed using lexiscan. Hemodynamics are adequate for diagnosis. Blood pressure demonstrated a normal response and heart rate demonstrated a blunted response to stress.  The patient's heart rate recovery was normal. The patient reported no symptoms during the stress test.       Interpretation Summary 4/2022         Left Ventricle: Normal left ventricular systolic function with a visually estimated EF of 55 - 60%. Left ventricle size is normal. Mildly increased wall thickness. Findings consistent with mild concentric hypertrophy. Normal wall motion. Diastolic dysfunction present with normal LV EF.   Mitral Valve: Mildly thickened leaflet. Mild annular calcification of the mitral valve. Mild regurgitation.   Left Atrium: Left atrium is mildly dilated. LA Vol Index A/L is 38 mL/m2. Assessment         ICD-10-CM ICD-9-CM    1. Coronary artery disease involving native coronary artery of native heart with angina pectoris with documented spasm ([facility])  I25.111 414.01 CBC WITH AUTOMATED DIFF     142.0 METABOLIC PANEL, BASIC      PROTHROMBIN TIME + INR      CASE REQUEST CATH LAB    Progressive increasing chest pain. Class III angina multiple nitroglycerin use. We will proceed with invasive cardiac work-up   2. Essential hypertension with goal blood pressure less than 140/90  I10 401.9     Stable monitor   3. DB on CPAP  G47.33 327.23     Z99.89 V46.8     Stable   4. Hyperlipidemia, unspecified hyperlipidemia type  E78.5 272.4 LIPID PANEL      HEPATIC FUNCTION PANEL    Continue treatment lab with PCP   Had tried cardizem in past -had drop in bp  No acei/normal lvef  2/2019  Angina significantly improved on addition of Imdur. Blood pressure controlled continue medical management  In 2019  Cardiac status stable. Angina stable. He has orthostatic dizziness. Likely from neuropathy. Will use support stocking. Consider additional medication if required if symptoms are persistent  2/2020  Recent syncope clinically vasovagal.  History of positive tilt table in 2013. Will discontinue amlodipine and monitor as blood pressure on low normal side. Monitor for angina and spasm. Continue with support stocking.   Pressure maneuvers and if needed midodrine  7/2020  Cardiac status stable. Okay to proceed with knee surgery with medium cardiac risk. No recurrence of syncope or angina  1/2021  Recent increase in chest tightness. Currently takes Imdur 60 mg increased amlodipine to 10 mg a day. Monitor blood pressure. Ordered lab including TSH LFT and lipids and BMP  5/2021  Episodes of palpitation and tachycardia noted. We will change amlodipine to diltiazem. Continue other treatment and monitor. Blood pressure starts elevating might need a combination of diltiazem and amlodipine  6/2021  Cardiac status stable. Blood pressure much better controlled. Heart rate better. Palpitations are better continue treatment  11/2021  Stable cardiac status. Intermittent use of nitroglycerin. Continue current medical management  4/2022  Patient seen with c/o increased episodes of chest pain with dyspnea with worsening exercise tolerance. He is using SL nitroglycerin more often. Will evaluate with stress test and echo. B/P controlled. EKG SR with T wave abnormalities. Continue current medications. 4/22/2022  Continues to have progression anginal pain requiring multiple nitroglycerin. Class III. Even though stress test is negative, concerned about progressive CAD and ischemia. We will proceed with cardiac cath  THE PATIENT UNDERSTANDS THAT ALTHOUGH RARE, SEVERE  UNEXPECTED COMPLICATIONS CAN OCCUR WITH EACH TYPE OF CARDIAC CATH PROCEDURE. THESE RISKS INCLUDE,  BUT ARE NOT LIMITED TO: ALLERGIC REACTION, INFECTION, BLEEDING, BLOOD VESSEL INJURY,   KIDNEY INJURY FROM X-RAY DYE, PUNCTURE OF THE HEART/LUNGS,   EMERGENT OPEN HEART SURGERY, HEART ATTACK, STROKE, CARDIAC  ARREST OR DEATH OR NEED FOR EMERGENCY CARDIAC BYPASS SURGERY       There are no discontinued medications.     Orders Placed This Encounter    CBC WITH AUTOMATED DIFF     Standing Status:   Future     Standing Expiration Date:   2/05/7611    METABOLIC PANEL, BASIC     Standing Status:   Future Standing Expiration Date:   5/22/2022    PROTHROMBIN TIME + INR     Standing Status:   Future     Standing Expiration Date:   5/22/2022    LIPID PANEL     Standing Status:   Future     Standing Expiration Date:   10/22/2022    HEPATIC FUNCTION PANEL     Standing Status:   Future     Standing Expiration Date:   10/22/2022       Follow-up and Dispositions    · Return in about 4 weeks (around 5/20/2022).

## 2022-04-27 ENCOUNTER — HOSPITAL ENCOUNTER (OUTPATIENT)
Dept: LAB | Age: 57
Discharge: HOME | End: 2022-04-27

## 2022-04-27 LAB — XX-LABCORP SPECIMEN COL,LCBCF: NORMAL

## 2022-04-27 PROCEDURE — 99001 SPECIMEN HANDLING PT-LAB: CPT

## 2022-04-27 RX ORDER — DILTIAZEM HYDROCHLORIDE 240 MG/1
240 CAPSULE, COATED, EXTENDED RELEASE ORAL DAILY
Qty: 90 CAPSULE | Refills: 3 | Status: SHIPPED | OUTPATIENT
Start: 2022-04-27

## 2022-04-28 LAB
ALBUMIN SERPL-MCNC: 4.6 G/DL (ref 3.8–4.9)
ALP SERPL-CCNC: 93 IU/L (ref 44–121)
ALT SERPL-CCNC: 38 IU/L (ref 0–44)
AST SERPL-CCNC: 25 IU/L (ref 0–40)
BASOPHILS # BLD AUTO: 0 X10E3/UL (ref 0–0.2)
BASOPHILS NFR BLD AUTO: 1 %
BILIRUB DIRECT SERPL-MCNC: <0.1 MG/DL (ref 0–0.4)
BILIRUB SERPL-MCNC: 0.2 MG/DL (ref 0–1.2)
BUN SERPL-MCNC: 17 MG/DL (ref 6–24)
BUN/CREAT SERPL: 17 (ref 9–20)
CALCIUM SERPL-MCNC: 9.5 MG/DL (ref 8.7–10.2)
CHLORIDE SERPL-SCNC: 106 MMOL/L (ref 96–106)
CHOLEST SERPL-MCNC: 140 MG/DL (ref 100–199)
CO2 SERPL-SCNC: 20 MMOL/L (ref 20–29)
CREAT SERPL-MCNC: 0.98 MG/DL (ref 0.76–1.27)
EGFR: 89 ML/MIN/1.73
EOSINOPHIL # BLD AUTO: 0.2 X10E3/UL (ref 0–0.4)
EOSINOPHIL NFR BLD AUTO: 3 %
ERYTHROCYTE [DISTWIDTH] IN BLOOD BY AUTOMATED COUNT: 14.5 % (ref 11.6–15.4)
GLUCOSE SERPL-MCNC: 97 MG/DL (ref 65–99)
HCT VFR BLD AUTO: 42.4 % (ref 37.5–51)
HDLC SERPL-MCNC: 37 MG/DL
HGB BLD-MCNC: 14.1 G/DL (ref 13–17.7)
IMM GRANULOCYTES # BLD AUTO: 0 X10E3/UL (ref 0–0.1)
IMM GRANULOCYTES NFR BLD AUTO: 0 %
INR PPP: 0.9 (ref 0.9–1.2)
LDLC SERPL CALC-MCNC: 85 MG/DL (ref 0–99)
LYMPHOCYTES # BLD AUTO: 2.3 X10E3/UL (ref 0.7–3.1)
LYMPHOCYTES NFR BLD AUTO: 33 %
MCH RBC QN AUTO: 28.5 PG (ref 26.6–33)
MCHC RBC AUTO-ENTMCNC: 33.3 G/DL (ref 31.5–35.7)
MCV RBC AUTO: 86 FL (ref 79–97)
MONOCYTES # BLD AUTO: 0.5 X10E3/UL (ref 0.1–0.9)
MONOCYTES NFR BLD AUTO: 8 %
NEUTROPHILS # BLD AUTO: 3.8 X10E3/UL (ref 1.4–7)
NEUTROPHILS NFR BLD AUTO: 55 %
PLATELET # BLD AUTO: 256 X10E3/UL (ref 150–450)
POTASSIUM SERPL-SCNC: 4.2 MMOL/L (ref 3.5–5.2)
PROT SERPL-MCNC: 7.3 G/DL (ref 6–8.5)
PROTHROMBIN TIME: 9.5 SEC (ref 9.1–12)
RBC # BLD AUTO: 4.94 X10E6/UL (ref 4.14–5.8)
SODIUM SERPL-SCNC: 144 MMOL/L (ref 134–144)
TRIGL SERPL-MCNC: 94 MG/DL (ref 0–149)
VLDLC SERPL CALC-MCNC: 18 MG/DL (ref 5–40)
WBC # BLD AUTO: 6.9 X10E3/UL (ref 3.4–10.8)

## 2022-05-03 ENCOUNTER — HOSPITAL ENCOUNTER (OUTPATIENT)
Age: 57
Setting detail: OUTPATIENT SURGERY
Discharge: HOME | End: 2022-05-03
Attending: INTERNAL MEDICINE | Admitting: INTERNAL MEDICINE
Payer: COMMERCIAL

## 2022-05-03 VITALS
DIASTOLIC BLOOD PRESSURE: 74 MMHG | OXYGEN SATURATION: 97 % | HEIGHT: 69 IN | RESPIRATION RATE: 21 BRPM | SYSTOLIC BLOOD PRESSURE: 137 MMHG | BODY MASS INDEX: 32.29 KG/M2 | HEART RATE: 90 BPM | WEIGHT: 218 LBS

## 2022-05-03 DIAGNOSIS — I25.10 CAD (CORONARY ARTERY DISEASE): ICD-10-CM

## 2022-05-03 PROCEDURE — 93454 CORONARY ARTERY ANGIO S&I: CPT | Performed by: INTERNAL MEDICINE

## 2022-05-03 PROCEDURE — 77030027845 HC BND COM RDL D-STAT TELE -B: Performed by: INTERNAL MEDICINE

## 2022-05-03 PROCEDURE — 77030015766: Performed by: INTERNAL MEDICINE

## 2022-05-03 PROCEDURE — 99152 MOD SED SAME PHYS/QHP 5/>YRS: CPT | Performed by: INTERNAL MEDICINE

## 2022-05-03 PROCEDURE — 74011000636 HC RX REV CODE- 636: Performed by: INTERNAL MEDICINE

## 2022-05-03 PROCEDURE — 74011250636 HC RX REV CODE- 250/636: Performed by: INTERNAL MEDICINE

## 2022-05-03 PROCEDURE — 74011250637 HC RX REV CODE- 250/637: Performed by: INTERNAL MEDICINE

## 2022-05-03 PROCEDURE — 77030013797 HC KT TRNSDUC PRSSR EDWD -A: Performed by: INTERNAL MEDICINE

## 2022-05-03 PROCEDURE — C1894 INTRO/SHEATH, NON-LASER: HCPCS | Performed by: INTERNAL MEDICINE

## 2022-05-03 PROCEDURE — 74011000250 HC RX REV CODE- 250: Performed by: INTERNAL MEDICINE

## 2022-05-03 PROCEDURE — 93458 L HRT ARTERY/VENTRICLE ANGIO: CPT | Performed by: INTERNAL MEDICINE

## 2022-05-03 RX ORDER — SODIUM CHLORIDE 0.9 % (FLUSH) 0.9 %
5-40 SYRINGE (ML) INJECTION EVERY 8 HOURS
Status: DISCONTINUED | OUTPATIENT
Start: 2022-05-03 | End: 2022-05-03 | Stop reason: HOSPADM

## 2022-05-03 RX ORDER — FENTANYL CITRATE 50 UG/ML
INJECTION, SOLUTION INTRAMUSCULAR; INTRAVENOUS AS NEEDED
Status: DISCONTINUED | OUTPATIENT
Start: 2022-05-03 | End: 2022-05-03 | Stop reason: HOSPADM

## 2022-05-03 RX ORDER — GUAIFENESIN 100 MG/5ML
81 LIQUID (ML) ORAL ONCE
Status: COMPLETED | OUTPATIENT
Start: 2022-05-03 | End: 2022-05-03

## 2022-05-03 RX ORDER — VERAPAMIL HYDROCHLORIDE 2.5 MG/ML
INJECTION, SOLUTION INTRAVENOUS AS NEEDED
Status: DISCONTINUED | OUTPATIENT
Start: 2022-05-03 | End: 2022-05-03 | Stop reason: HOSPADM

## 2022-05-03 RX ORDER — NITROGLYCERIN 0.4 MG/1
0.4 TABLET SUBLINGUAL
Status: DISCONTINUED | OUTPATIENT
Start: 2022-05-03 | End: 2022-05-03 | Stop reason: HOSPADM

## 2022-05-03 RX ORDER — HEPARIN SODIUM 1000 [USP'U]/ML
INJECTION, SOLUTION INTRAVENOUS; SUBCUTANEOUS AS NEEDED
Status: DISCONTINUED | OUTPATIENT
Start: 2022-05-03 | End: 2022-05-03 | Stop reason: HOSPADM

## 2022-05-03 RX ORDER — SODIUM CHLORIDE 9 MG/ML
125 INJECTION, SOLUTION INTRAVENOUS CONTINUOUS
Status: DISPENSED | OUTPATIENT
Start: 2022-05-03 | End: 2022-05-03

## 2022-05-03 RX ORDER — MIDAZOLAM HYDROCHLORIDE 1 MG/ML
INJECTION, SOLUTION INTRAMUSCULAR; INTRAVENOUS AS NEEDED
Status: DISCONTINUED | OUTPATIENT
Start: 2022-05-03 | End: 2022-05-03 | Stop reason: HOSPADM

## 2022-05-03 RX ORDER — HEPARIN SODIUM 200 [USP'U]/100ML
INJECTION, SOLUTION INTRAVENOUS
Status: COMPLETED | OUTPATIENT
Start: 2022-05-03 | End: 2022-05-03

## 2022-05-03 RX ORDER — LIDOCAINE HYDROCHLORIDE 10 MG/ML
INJECTION, SOLUTION EPIDURAL; INFILTRATION; INTRACAUDAL; PERINEURAL AS NEEDED
Status: DISCONTINUED | OUTPATIENT
Start: 2022-05-03 | End: 2022-05-03 | Stop reason: HOSPADM

## 2022-05-03 RX ORDER — SODIUM CHLORIDE 0.9 % (FLUSH) 0.9 %
5-40 SYRINGE (ML) INJECTION AS NEEDED
Status: DISCONTINUED | OUTPATIENT
Start: 2022-05-03 | End: 2022-05-03 | Stop reason: HOSPADM

## 2022-05-03 RX ADMIN — ASPIRIN 81 MG 81 MG: 81 TABLET ORAL at 08:41

## 2022-05-03 NOTE — Clinical Note
TRANSFER - OUT REPORT:     Verbal report given to: Jayshree Ferguson RN. Report consisted of patient's Situation, Background, Assessment and   Recommendations(SBAR). Opportunity for questions and clarification was provided. Patient transported with a Cardiac Cath Tech / Patient Care Tech. Patient transported to: holding area.

## 2022-05-03 NOTE — PROGRESS NOTES
Cardiac catheterization done via right radial artery without any complications. Please see the report for details. No critical coronary artery disease noted. Medical treatment and risk factor modification to continue.

## 2022-05-03 NOTE — Clinical Note
Contrast Dose Calculator:   Patient's age: 60.   Patient's sex: Male. Patient weight (kg) = 98.9. Creatinine level (mg/dL) = 0.98. Creatinine clearance (mL/min): 115. Contrast concentration (mg/mL) = 300. MACD = 300 mL. Max Contrast dose per Creatinine Cl calculator = 258.75 mL.

## 2022-05-03 NOTE — DISCHARGE INSTRUCTIONS
HEART CATHETERIZATION/ANGIOGRAPHY DISCHARGE INSTRUCTIONS    1. Check puncture site frequently for swelling or bleeding. If there is any bleeding, lie down and apply pressure over the area with a clean towel or washcloth. Notify your doctor for any redness, swelling, drainage, or oozing from the puncture site. Notify your doctor for any fever or chills. 2. If the extremity becomes cold, numb, or painful go to the Emergency Room. 3. Activity should be limited for the next 48 hours. Climb stairs as little as possible and avoid any stooping, bending, or strenuous activity for 48 hours. No heavy lifting (anything over 8 pounds) for 5 days. 4. You may resume your usual diet. Drink more fluids than usual.  5. Have a responsible person drive you home and stay with you for at least 24 hours after your heart catheterization/angiography. 6. You may remove bandage from your Right and Arm in 24 hours. You may shower in 24 hours. No tub baths, hot tubs, or swimming for 1 week. Do not place any lotions, creams, powders, or ointments over puncture site for 1 week. You may place a clean band-aid over the puncture site each day for 5 days. Change daily. 7. If you take Metformin, do not take it for 48 hours. 8. Ask your nurse when to restart any blood thinners. How can you care for yourself at home? Activity  · Do not do strenuous exercise and do not lift, pull, or push anything heavy until your doctor says it is okay. This may be for a day or two. You can walk around the house and do light activity, such as cooking. · You may shower 24 to 48 hours after the procedure, if your doctor okays it. Pat the incision dry. Do not take a bath for 1 week, or until your doctor tells you it is okay. · If the catheter was placed in your groin, try not to walk up stairs for the first couple of days. · If the catheter was placed in your arm near your wrist, do not bend your wrist deeply for the first couple of days.  Be careful using your hand to get into and out of a chair or bed. · If your doctor recommends it, get more exercise. Walking is a good choice. Bit by bit, increase the amount you walk every day. Try for at least 30 minutes on most days of the week. Diet  · Drink plenty of fluids to help your body flush out the dye. If you have kidney, heart, or liver disease and have to limit fluids, talk with your doctor before you increase the amount of fluids you drink. · Keep eating a heart-healthy diet that has lots of fruits, vegetables, and whole grains. If you have not been eating this way, talk to your doctor. You also may want to talk to a dietitian. This expert can help you to learn about healthy foods and plan meals. Medicines  · Your doctor will tell you if and when you can restart your medicines. He or she will also give you instructions about taking any new medicines. · If you take blood thinners, such as warfarin (Coumadin), clopidogrel (Plavix), or aspirin, be sure to talk to your doctor. He or she will tell you if and when to start taking those medicines again. Make sure that you understand exactly what your doctor wants you to do. · Your doctor may prescribe a blood-thinning medicine like aspirin or clopidogrel (Plavix). It is very important that you take these medicines exactly as directed in order to keep the coronary artery open and reduce your risk of a heart attack. Be safe with medicines. Call your doctor if you think you are having a problem with your medicine. Care of the catheter site  · For the first 3 days, keep a bandage over the spot where the catheter was inserted. · Put ice or a cold pack on the area for 10 to 20 minutes at a time to help with soreness or swelling. Put a thin cloth between the ice and your skin. Sedation for a Medical Procedure: Care Instructions  Your Care Instructions  For a minor procedure or surgery, you will get a sedative to help you relax. This drug will make you sleepy.  It is usually given in a vein (by IV). A shot may also be used to numb the area. If you had local anesthesia, you may feel some pain and discomfort as it wears off. If you have pain, don't be afraid to say so. Pain medicine works better if you take it before the pain gets bad. Common side effects from sedation include:  · Feeling sleepy. (Your doctors and nurses will make sure you are not too sleepy to go home.)  · Nausea and vomiting. This usually does not last long. · Feeling tired. Follow-up care is a key part of your treatment and safety. Be sure to make and go to all appointments, and call your doctor if you are having problems. It's also a good idea to know your test results and keep a list of the medicines you take. How can you care for yourself at home? Activity  · Don't do anything for 24 hours that requires attention to detail. It takes time for the medicine effects to completely wear off. · For your safety, you should not drive or operate any machinery that could be dangerous until the medicine wears off and you can think clearly and react easily. · Rest when you feel tired. Getting enough sleep will help you recover. Diet  · You can eat your normal diet, unless your doctor gives you other instructions. If your stomach is upset, try clear liquids and bland, low-fat foods like plain toast or rice. · Drink plenty of fluids (unless your doctor tells you not to). · Don't drink alcohol for 24 hours. Medicines  · Be safe with medicines. Read and follow all instructions on the label. ¨ If the doctor gave you a prescription medicine for pain, take it as prescribed. ¨ If you are not taking a prescription pain medicine, ask your doctor if you can take an over-the-counter medicine. · If you think your pain medicine is making you sick to your stomach:  ¨ Take your medicine after meals (unless your doctor has told you not to). ¨ Ask your doctor for a different pain medicine.     Follow-up care is a key part of your treatment and safety. Be sure to make and go to all appointments, and call your doctor if you are having problems. It's also a good idea to know your test results and keep a list of the medicines you take. When should you call for help? Call 911 anytime you think you may need emergency care. For example, call if:  · You passed out (lost consciousness). · You have severe trouble breathing. · You have sudden chest pain and shortness of breath, or you cough up blood. · You have symptoms of a heart attack. These may include:  ¨ Chest pain or pressure, or a strange feeling in the chest.  ¨ Sweating. ¨ Shortness of breath. ¨ Nausea or vomiting. ¨ Pain, pressure, or a strange feeling in the back, neck, jaw, or upper belly, or in one or both shoulders or arms. ¨ Lightheadedness or sudden weakness. ¨ A fast or irregular heartbeat. After you call 911, the  may tel you to chew 1 adult-strength or 2 to 4 low-dose aspirin. Wait for an ambulance. Do not try to drive yourself. · You have been diagnosed with angina, and you have symptoms that do not go away with rest or are not getting better within 5 minutes after you take a dose of nitroglycerin. Call your doctor now or seek immediate medical care if:  · You are bleeding from the area where the catheter was put in your artery. · You have a fast-growing, painful lump at the catheter site. · You have signs of infection, such as:  ¨ Increased pain, swelling, warmth, or redness. ¨ Red streaks leading from the catheter site. ¨ Pus draining from the catheter site. ¨ A fever. · Your leg or arm looks blue or feels cold, numb, or tingly. These are general instructions for a healthy lifestyle:    No smoking/ No tobacco products/ Avoid exposure to second hand smoke    Surgeon General's Warning:  Quitting smoking now greatly reduces serious risk to your health.     Obesity, smoking, and sedentary lifestyle greatly increases your risk for illness    A healthy diet, regular physical exercise & weight monitoring are important for maintaining a healthy lifestyle    You may be retaining fluid if you have a history of heart failure or if you experience any of the following symptoms:  Weight gain of 3 pounds or more overnight or 5 pounds in a week, increased swelling in our hands or feet or shortness of breath while lying flat in bed. Please call your doctor as soon as you notice any of these symptoms; do not wait until your next office visit. Recognize signs and symptoms of STROKE:    F-face looks uneven    A-arms unable to move or move unevenly    S-speech slurred or non-existent    T-time-call 911 as soon as signs and symptoms begin-DO NOT go       Back to bed or wait to see if you get better-TIME IS BRAIN. Warning Signs of HEART ATTACK     Call 911 if you have these symptoms:   Chest discomfort. Most heart attacks involve discomfort in the center of the chest that lasts more than a few minutes, or that goes away and comes back. It can feel like uncomfortable pressure, squeezing, fullness, or pain.  Discomfort in other areas of the upper body. Symptoms can include pain or discomfort in one or both arms, the back, neck, jaw, or stomach.  Shortness of breath with or without chest discomfort.  Other signs may include breaking out in a cold sweat, nausea, or lightheadedness. Don't wait more than five minutes to call 911 - MINUTES MATTER! Fast action can save your life. Calling 911 is almost always the fastest way to get lifesaving treatment. Emergency Medical Services staff can begin treatment when they arrive -- up to an hour sooner than if someone gets to the hospital by car.        DISCHARGE SUMMARY from Nurse: PATIENT INSTRUCTIONS:    After general anesthesia or intravenous sedation, for 24 hours or while taking prescription Narcotics:  · Limit your activities  · Do not drive and operate hazardous machinery  · Do not make important personal or business decisions  · Do  not drink alcoholic beverages  · If you have not urinated within 8 hours after discharge, please contact your surgeon on call. Report the following to your surgeon:  · Excessive pain, swelling, redness or odor of or around the surgical area  · Temperature over 100.5  · Nausea and vomiting lasting longer than 4 hours or if unable to take medications  · Any signs of decreased circulation or nerve impairment to extremity: change in color, persistent  numbness, tingling, coldness or increase pain  · Any questions    What to do at Home:  Recommended activity: activity as tolerated and no driving for today. If you experience any symptoms of infection or bleeding, please follow up with Dr. Brannon Hernandez or your Primary Care Physician.  Please give a list of your current medications to your Primary Care Provider.  Please update this list whenever your medications are discontinued, doses are changed, or new medications (including over-the-counter products) are added.  Please carry medication information at all times in case of emergency situations. These are general instructions for a healthy lifestyle:   No smoking/ No tobacco products/ Avoid exposure to second hand smoke   Surgeon General's Warning:  Quitting smoking now greatly reduces serious risk to your health.  Obesity, smoking, and sedentary lifestyle greatly increases your risk for illness   A healthy diet, regular physical exercise & weight monitoring are important for maintaining a healthy lifestyle. You may be retaining fluid if you have a history of heart failure or if you experience any of the following symptoms:  Weight gain of 3 pounds or more overnight or 5 pounds in a week, increased swelling in our hands or feet or shortness of breath while lying flat in bed. Please call your doctor as soon as you notice any of these symptoms; do not wait until your next office visit.     The discharge information has been reviewed with the patient. The patient verbalized understanding. Discharge medications reviewed with the patient and appropriate educational materials and side effects teaching were provided.   _________________________________________________________________________________

## 2022-05-03 NOTE — INTERVAL H&P NOTE
Update History & Physical    The Patient's History and Physical of April 22,   Card cath/PCI/CABG was reviewed with the patient and I examined the patient. There was no change. The surgical site was confirmed by the patient and me. Plan:  The risk, benefits, expected outcome, and alternative to the recommended procedure have been discussed with the patient. Patient understands and wants to proceed with the procedure.     Electronically signed by Ana Martin MD on 5/3/2022 at 9:24 AM

## 2022-05-03 NOTE — ROUTINE PROCESS
0730: Cath holding summary     Patient escorted to cath holding from waiting area ambulatory, alert and oriented x 4, voicing no complaints. Changed into gown and placed on monitor. NPO since MN. Lab results, med rec and H&P reviewed on chart. PIV x 1 inserted without difficulty. Family to bedside. 1445: AVS Discharge instructions reviewed with patient and copy given to patient. All questions answered. Patient verbalized understanding to all discharge instructions. PIV removed. Procedural site within normal limits. No hematoma or bleeding noted from procedural and PIV site. No pain noted at discharge. Patient discharged with support person in stable condition. Escorted out to vehicle for transport home.

## 2022-05-03 NOTE — Clinical Note
Right radial artery. Accessed unsuccessfully. Radial access needle used. Using ultrasound guidance. Number of attempts =  1.  Out and holding pressure

## 2022-05-03 NOTE — Clinical Note
TRANSFER - IN REPORT:     Verbal report received from: JORGE Saravia. Report consisted of patient's Situation, Background, Assessment and   Recommendations(SBAR). Opportunity for questions and clarification was provided. Assessment completed upon patient's arrival to unit and care assumed. Patient transported with a Cardiac Cath Tech / Patient Care Tech.

## 2022-05-20 ENCOUNTER — OFFICE VISIT (OUTPATIENT)
Dept: CARDIOLOGY CLINIC | Age: 57
End: 2022-05-20
Payer: COMMERCIAL

## 2022-05-20 VITALS
HEIGHT: 69 IN | HEART RATE: 89 BPM | BODY MASS INDEX: 32.73 KG/M2 | OXYGEN SATURATION: 99 % | DIASTOLIC BLOOD PRESSURE: 64 MMHG | WEIGHT: 221 LBS | SYSTOLIC BLOOD PRESSURE: 109 MMHG

## 2022-05-20 DIAGNOSIS — I25.10 CORONARY ARTERY DISEASE INVOLVING NATIVE CORONARY ARTERY OF NATIVE HEART WITHOUT ANGINA PECTORIS: Primary | ICD-10-CM

## 2022-05-20 DIAGNOSIS — R55 VASOVAGAL SYNCOPE: ICD-10-CM

## 2022-05-20 DIAGNOSIS — Z99.89 OSA ON CPAP: ICD-10-CM

## 2022-05-20 DIAGNOSIS — I10 ESSENTIAL HYPERTENSION WITH GOAL BLOOD PRESSURE LESS THAN 140/90: ICD-10-CM

## 2022-05-20 DIAGNOSIS — G47.33 OSA ON CPAP: ICD-10-CM

## 2022-05-20 DIAGNOSIS — E78.5 HYPERLIPIDEMIA, UNSPECIFIED HYPERLIPIDEMIA TYPE: ICD-10-CM

## 2022-05-20 PROCEDURE — 99214 OFFICE O/P EST MOD 30 MIN: CPT | Performed by: NURSE PRACTITIONER

## 2022-05-20 NOTE — PATIENT INSTRUCTIONS
Heart-Healthy Diet: Care Instructions  Your Care Instructions     A heart-healthy diet has lots of vegetables, fruits, nuts, beans, and whole grains, and is low in salt. It limits foods that are high in saturated fat, such as meats, cheeses, and fried foods. It may be hard to change your diet, but even small changes can lower your risk of heart attack and heart disease. Follow-up care is a key part of your treatment and safety. Be sure to make and go to all appointments, and call your doctor if you are having problems. It's also a good idea to know your test results and keep a list of the medicines you take. How can you care for yourself at home? Watch your portions  · Use food labels to learn what the recommended servings are for the foods you eat. · Eat only the number of calories you need to stay at a healthy weight. If you need to lose weight, eat fewer calories than your body burns (through exercise and other physical activity). Eat more fruits and vegetables  · Eat a variety of fruit and vegetables every day. Dark green, deep orange, red, or yellow fruits and vegetables are especially good for you. Examples include spinach, carrots, peaches, and berries. · Keep carrots, celery, and other veggies handy for snacks. Buy fruit that is in season and store it where you can see it so that you will be tempted to eat it. · Cook dishes that have a lot of veggies in them, such as stir-fries and soups. Limit saturated fat  · Read food labels, and try to avoid saturated fats. They increase your risk of heart disease. · Use olive or canola oil when you cook. · Bake, broil, grill, or steam foods instead of frying them. · Choose lean meats instead of high-fat meats such as hot dogs and sausages. Cut off all visible fat when you prepare meat. · Eat fish, skinless poultry, and meat alternatives such as soy products instead of high-fat meats.  Soy products, such as tofu, may be especially good for your heart.  · Choose low-fat or fat-free milk and dairy products. Eat foods high in fiber  · Eat a variety of grain products every day. Include whole-grain foods that have lots of fiber and nutrients. Examples of whole-grain foods include oats, whole wheat bread, and brown rice. · Buy whole-grain breads and cereals, instead of white bread or pastries. Limit salt and sodium  · Limit how much salt and sodium you eat to help lower your blood pressure. · Taste food before you salt it. Add only a little salt when you think you need it. With time, your taste buds will adjust to less salt. · Eat fewer snack items, fast foods, and other high-salt, processed foods. Check food labels for the amount of sodium in packaged foods. · Choose low-sodium versions of canned goods (such as soups, vegetables, and beans). Limit sugar  · Limit drinks and foods with added sugar. These include candy, desserts, and soda pop. Limit alcohol  · Limit alcohol to no more than 2 drinks a day for men and 1 drink a day for women. Too much alcohol can cause health problems. When should you call for help? Watch closely for changes in your health, and be sure to contact your doctor if:    · You would like help planning heart-healthy meals. Where can you learn more? Go to http://www.negron.com/  Enter V137 in the search box to learn more about \"Heart-Healthy Diet: Care Instructions. \"  Current as of: September 8, 2021               Content Version: 13.2  © 2006-2022 [employer]. Care instructions adapted under license by [employer] (which disclaims liability or warranty for this information). If you have questions about a medical condition or this instruction, always ask your healthcare professional. [place] [address] any warranty or liability for your use of this information.

## 2022-05-20 NOTE — PROGRESS NOTES
1. Have you been to the ER, urgent care clinic since your last visit? Hospitalized since your last visit? Yes Where: Urgent care for skin infection    2. Have you seen or consulted any other health care providers outside of the [address] [place] [address] since your last visit? Include any pap smears or colon screening.  No

## 2022-05-20 NOTE — PROGRESS NOTES
HISTORY OF PRESENT ILLNESS  Jayesh Madrigal is a 60 y.o. male. Patient with cad,coronary spasm,htn  On follow up patient denies any sob, palpitation or other significant symptoms. 2/2020  Seen for follow-up after recent admission. Episode of syncope started while he was standing in line for longer time started with dizziness subsequent syncope. Occasional lightheaded when standing in 1 position. He had positive tilt table in 2013. No recent syncopal episodes. 1/2021  Patient seen today for follow-up. Complains of chest tightness on the left side. Under significant stress at present. Also feels malaise and fatigue. 4/2022  Patient with h/o coronary spasm, seen today for complaints of chest discomfort with shortness of breath. This has been ongoing x 2 weeks - worse with physical activity and resolves with rest or nitroglycerin. He reports ongoing rare palpitations, denies edema. 5/2022  Presents to follow-up postcardiac catheterization which revealed no obstructive coronary disease. He complains of mild shortness of breath with exertion. He denies chest pain, palpitations or edema. Follow-up  Associated symptoms include shortness of breath. Pertinent negatives include no chest pain. Hypertension  The history is provided by the patient. This is a chronic problem. The problem occurs constantly. The problem has not changed since onset. Associated symptoms include shortness of breath. Pertinent negatives include no chest pain. Cholesterol Problem  The history is provided by the patient. This is a chronic problem. The problem occurs constantly. The problem has not changed since onset. Associated symptoms include shortness of breath. Pertinent negatives include no chest pain. Chest Pain (Angina)   The history is provided by the patient. This is a recurrent problem. The problem has been gradually worsening. The problem occurs every several days.  The pain is associated with exertion and rest. The pain is present in the substernal region. The pain is mild. The quality of the pain is described as pressure-like. The pain does not radiate. Associated symptoms include shortness of breath. Pertinent negatives include no claudication, no cough, no dizziness, no fever, no hemoptysis, no nausea, no orthopnea, no palpitations, no PND, no sputum production, no vomiting and no weakness. He has tried nitroglycerin for the symptoms. Review of Systems   Constitutional: Negative for chills and fever. HENT: Negative for nosebleeds. Eyes: Negative for blurred vision and double vision. Respiratory: Positive for shortness of breath. Negative for cough, hemoptysis, sputum production and wheezing. Cardiovascular: Negative for chest pain, palpitations, orthopnea, claudication, leg swelling and PND. Gastrointestinal: Negative for heartburn, nausea and vomiting. Musculoskeletal: Negative for myalgias. Skin: Negative for rash. Neurological: Negative for dizziness and weakness. Endo/Heme/Allergies: Does not bruise/bleed easily.      Family History   Problem Relation Age of Onset    Hypertension Mother     Diabetes Mother     Hypertension Father     Cancer Father         Prostate    Heart Disease Father     Heart Attack Father     Stroke Brother     Migraines Brother     Cancer Sister         Breast    Colon Polyps Sister     Pancreatic Cancer Sister    [facility] Migraines Sister     Stroke Brother        Past Medical History:   Diagnosis Date    Abnormal tilt table test 2013    Back pain     Back pain     CAD (coronary artery disease)     Coronary artery spasm ([address]     Difficult intubation     15 years ago during thumb sx, no problem since then    GERD (gastroesophageal reflux disease)     Glaucoma     Hearing loss 2021    has an audiologist, has hearing aids    Hypertension     Kidney stone     Migraine     migraines    Prediabetes     S/P colonoscopic polypectomy 09/2016, 12/1/2021    Sleep apnea     uses cpap    Syncope     has had recurrent issues, has had work-up to include EEG, monitor, tilt table, Carotids, ECHO       Past Surgical History:   Procedure Laterality Date    COLONOSCOPY N/A 9/30/2016    COLONOSCOPY with biopsy performed by Mercedes Goel MD at [facility] COLONOSCOPY N/A 12/1/2021    COLONOSCOPY performed by Kimmie Pappas MD at [address]      x3    HX COLONOSCOPY  09/2016    Dr. Deanna Bernabe      reportedly normal by patient    HX HEENT      sinus sx    HX KNEE REPLACEMENT Right 08/13/2020    Dr [place]      left shoulder x 3, right knee, left thumb, left great toe sx    HX ORTHOPAEDIC Right     elbow    HX UROLOGICAL      Lithotripsy    MA CARDIAC SURG PROCEDURE UNLIST      cardiac catheterization times 5       Social History     Tobacco Use    Smoking status: Never Smoker    Smokeless tobacco: Never Used   Substance Use Topics    Alcohol use: Yes     Comment: rare use       No Known Allergies        Visit Vitals  /64 (BP 1 Location: Left upper arm, BP Patient Position: Sitting, BP Cuff Size: Adult)   Pulse 89   Ht 5' 9\" (1.753 m)   Wt 100.2 kg (221 lb)   SpO2 99%   BMI 32.64 kg/m²         Physical Exam  Vitals and nursing note reviewed. Constitutional:       Appearance: Normal appearance. He is well-developed. HENT:      Head: Normocephalic and atraumatic. Eyes:      Conjunctiva/sclera: Conjunctivae normal.   Neck:      Thyroid: No thyromegaly. Vascular: No JVD. Trachea: No tracheal deviation. Cardiovascular:      Rate and Rhythm: Normal rate and regular rhythm. Heart sounds: Normal heart sounds. No murmur heard. No friction rub. No gallop. Comments: Right wrist cath access site intact, no hematoma, radial pulse palpable  Pulmonary:      Effort: No respiratory distress. Breath sounds: Normal breath sounds. No wheezing or rales.    Chest:      Chest wall: No tenderness. Abdominal:      Palpations: Abdomen is soft. Tenderness: There is no abdominal tenderness. Musculoskeletal:      Cervical back: Neck supple. Right lower leg: No edema. Left lower leg: No edema. Skin:     General: Skin is warm and dry. Neurological:      Mental Status: He is alert and oriented to person, place, and time. Mr. Rosemarie Nicolas has a reminder for a \"due or due soon\" health maintenance. I have asked that he contact his primary care provider for follow-up on this health maintenance. Cath-2004  rca spasm-  lcx 30-40%  Stress test-2015  Fixed ant defect  Echo-2015  Normal lvef  ekg-7/2016-  wnl  I Have personally reviewed recent relevant labs available and discussed with patient  5/2019-BMP, LFT, lipids. LDL-49    Echo Cardiogram Complete2/3/2020  [address]  Component Name Value Ref Range   EF Echo 60     Result Impression   :   NORMAL LEFT VENTRICULAR CAVITY SIZE AND SYSTOLIC FUNCTION WITH AN EJECTION FRACTION OF  60 %. ABNORMAL DIASTOLIC FILLING PATTERN. MITRAL ANNULAR CALCIFICATION WITH MILD MITRAL REGURGITATION. SCLEROTIC TRILEAFLET AORTIC VALVE WITHOUT EVIDENCE OF STENOSIS. TRACE OF TRICUSPID REGURGITATION WITH A RVSP OF 38 MMHG. STRUCTURALLY NORMAL PULMONIC VALVE WITH TRACE PULMONIC REGURGITATION. NO EVIDENCE OF PERICARDIAL EFFUSION. NO MASSES, SHUNTS OR THROMBI SEEN. NO PREVIOUS REPORT FOR COMPARISON. 2/2020- carotid artery PVL  Conclusions: Normal extracranial carotid and vertebral duplex examination. I Have personally reviewed recent relevant labs available and discussed with patient  2/2021  Leti Craft MD ECG [place], SPECT [place], Test Supervisor 4/14/2022       Interpretation Summary         Nuclear Findings: Normal pharmacological myocardial perfusion study. LVEF measures 53%.   Nuclear Findings: LVEF measures 53%. LV perfusion is normal.    Nuclear Findings: Normal left ventricular systolic function post-stress.  LVEF measures 53%.   ECG: Resting ECG demonstrates normal sinus rhythm.   ECG: Stress ECG was negative for ischemia.   Stress Test: A pharmacological stress test was performed using lexiscan. Hemodynamics are adequate for diagnosis. Blood pressure demonstrated a normal response and heart rate demonstrated a blunted response to stress. The patient's heart rate recovery was normal. The patient reported no symptoms during the stress test.       Interpretation Summary 4/2022         Left Ventricle: Normal left ventricular systolic function with a visually estimated EF of 55 - 60%. Left ventricle size is normal. Mildly increased wall thickness. Findings consistent with mild concentric hypertrophy. Normal wall motion. Diastolic dysfunction present with normal LV EF.   Mitral Valve: Mildly thickened leaflet. Mild annular calcification of the mitral valve. Mild regurgitation.   Left Atrium: Left atrium is mildly dilated. LA Vol Index A/L is 38 mL/m2. Cardiac cath 5/2022  Conclusion     · No evidence of any significant critical disease in the coronary arteries other than diffuse luminal irregularities. Medical treatment and risk factor modification to continue.       Assessment         ICD-10-CM ICD-9-CM    1. Coronary artery disease involving native coronary artery of native heart without angina pectoris  I25.10 414.01      Cardiac cath revealed no evidence of any significant critical disease in the coronary arteries other than diffuse luminal irregularities  Continue medical danelle   2. Essential hypertension with goal blood pressure less than 140/90  I10 401.9     Blood pressure is controlled continue current medications   3. DB on CPAP  G47.33 327.23     Z99.89 V46.8     Continue treatment   4. Hyperlipidemia, unspecified hyperlipidemia type  E78.5 272.4     Continue treatment, lab with PCP   5.  Vasovagal syncope  R55 780.2     Stable monitor   Had tried cardizem in past -had drop in bp  No acei/normal lvef  2/2019  Angina significantly improved on addition of Imdur. Blood pressure controlled continue medical management  In 2019  Cardiac status stable. Angina stable. He has orthostatic dizziness. Likely from neuropathy. Will use support stocking. Consider additional medication if required if symptoms are persistent  2/2020  Recent syncope clinically vasovagal.  History of positive tilt table in 2013. Will discontinue amlodipine and monitor as blood pressure on low normal side. Monitor for angina and spasm. Continue with support stocking. Pressure maneuvers and if needed midodrine  7/2020  Cardiac status stable. Okay to proceed with knee surgery with medium cardiac risk. No recurrence of syncope or angina  1/2021  Recent increase in chest tightness. Currently takes Imdur 60 mg increased amlodipine to 10 mg a day. Monitor blood pressure. Ordered lab including TSH LFT and lipids and BMP  5/2021  Episodes of palpitation and tachycardia noted. We will change amlodipine to diltiazem. Continue other treatment and monitor. Blood pressure starts elevating might need a combination of diltiazem and amlodipine  6/2021  Cardiac status stable. Blood pressure much better controlled. Heart rate better. Palpitations are better continue treatment  11/2021  Stable cardiac status. Intermittent use of nitroglycerin. Continue current medical management  4/2022  Patient seen with c/o increased episodes of chest pain with dyspnea with worsening exercise tolerance. He is using SL nitroglycerin more often. Will evaluate with stress test and echo. B/P controlled. EKG SR with T wave abnormalities. Continue current medications. 4/22/2022  Continues to have progression anginal pain requiring multiple nitroglycerin. Class III. Even though stress test is negative, concerned about progressive CAD and ischemia.   We will proceed with cardiac cath  THE PATIENT UNDERSTANDS THAT ALTHOUGH RARE, SEVERE  UNEXPECTED COMPLICATIONS CAN OCCUR WITH EACH TYPE OF CARDIAC CATH PROCEDURE. THESE RISKS INCLUDE,  BUT ARE NOT LIMITED TO: ALLERGIC REACTION, INFECTION, BLEEDING, BLOOD VESSEL INJURY,   KIDNEY INJURY FROM X-RAY DYE, PUNCTURE OF THE HEART/LUNGS,   EMERGENT OPEN HEART SURGERY, HEART ATTACK, STROKE, CARDIAC  ARREST OR DEATH OR NEED FOR EMERGENCY CARDIAC BYPASS SURGERY   5/2022  Patient seen status postcardiac cath which revealed :No evidence of any significant critical disease in the coronary arteries other than diffuse luminal irregularities. We will continue risk factor reduction with blood pressure control aspirin and statin. Recommend to continue current medications and weight loss efforts. There are no discontinued medications. No orders of the defined types were placed in this encounter. Follow-up and Dispositions    · Return in about 6 months (around 11/20/2022) for Follow up with Dr. Dmitry Robins

## 2022-05-23 DIAGNOSIS — K21.9 GASTROESOPHAGEAL REFLUX DISEASE WITHOUT ESOPHAGITIS: ICD-10-CM

## 2022-05-23 RX ORDER — OMEPRAZOLE 40 MG/1
CAPSULE, DELAYED RELEASE ORAL
Qty: 90 CAPSULE | Refills: 1 | Status: SHIPPED | OUTPATIENT
Start: 2022-05-23 | End: 2022-11-01 | Stop reason: SDUPTHER

## 2022-07-04 DIAGNOSIS — E78.5 HYPERLIPIDEMIA, UNSPECIFIED HYPERLIPIDEMIA TYPE: ICD-10-CM

## 2022-07-05 RX ORDER — ROSUVASTATIN CALCIUM 10 MG/1
TABLET, COATED ORAL
Qty: 90 TABLET | Refills: 3 | Status: SHIPPED | OUTPATIENT
Start: 2022-07-05

## 2022-09-12 ENCOUNTER — HOSPITAL ENCOUNTER (OUTPATIENT)
Dept: LAB | Age: 57
Discharge: HOME | End: 2022-09-12

## 2022-09-12 LAB — XX-LABCORP SPECIMEN COL,LCBCF: NORMAL

## 2022-09-12 PROCEDURE — 99001 SPECIMEN HANDLING PT-LAB: CPT

## 2022-09-13 LAB
ALBUMIN SERPL-MCNC: 4.6 G/DL (ref 3.8–4.9)
ALBUMIN/GLOB SERPL: 1.7 {RATIO} (ref 1.2–2.2)
ALP SERPL-CCNC: 101 IU/L (ref 44–121)
ALT SERPL-CCNC: 30 IU/L (ref 0–44)
AST SERPL-CCNC: 27 IU/L (ref 0–40)
BASOPHILS # BLD AUTO: 0.1 X10E3/UL (ref 0–0.2)
BASOPHILS NFR BLD AUTO: 1 %
BILIRUB SERPL-MCNC: 0.3 MG/DL (ref 0–1.2)
BUN SERPL-MCNC: 15 MG/DL (ref 6–24)
BUN/CREAT SERPL: 15 (ref 9–20)
CALCIUM SERPL-MCNC: 9.7 MG/DL (ref 8.7–10.2)
CHLORIDE SERPL-SCNC: 101 MMOL/L (ref 96–106)
CHOLEST SERPL-MCNC: 133 MG/DL (ref 100–199)
CO2 SERPL-SCNC: 25 MMOL/L (ref 20–29)
CREAT SERPL-MCNC: 1.02 MG/DL (ref 0.76–1.27)
EGFR: 85 ML/MIN/1.73
EOSINOPHIL # BLD AUTO: 0.2 X10E3/UL (ref 0–0.4)
EOSINOPHIL NFR BLD AUTO: 2 %
ERYTHROCYTE [DISTWIDTH] IN BLOOD BY AUTOMATED COUNT: 13.5 % (ref 11.6–15.4)
GLOBULIN SER CALC-MCNC: 2.7 G/DL (ref 1.5–4.5)
GLUCOSE SERPL-MCNC: 87 MG/DL (ref 65–99)
HBA1C MFR BLD: 6 % (ref 4.8–5.6)
HCT VFR BLD AUTO: 42.7 % (ref 37.5–51)
HDLC SERPL-MCNC: 39 MG/DL
HGB BLD-MCNC: 14.4 G/DL (ref 13–17.7)
IMM GRANULOCYTES # BLD AUTO: 0 X10E3/UL (ref 0–0.1)
IMM GRANULOCYTES NFR BLD AUTO: 0 %
IMP & REVIEW OF LAB RESULTS: NORMAL
LDLC SERPL CALC-MCNC: 78 MG/DL (ref 0–99)
LYMPHOCYTES # BLD AUTO: 2.3 X10E3/UL (ref 0.7–3.1)
LYMPHOCYTES NFR BLD AUTO: 30 %
MCH RBC QN AUTO: 28.7 PG (ref 26.6–33)
MCHC RBC AUTO-ENTMCNC: 33.7 G/DL (ref 31.5–35.7)
MCV RBC AUTO: 85 FL (ref 79–97)
MONOCYTES # BLD AUTO: 0.7 X10E3/UL (ref 0.1–0.9)
MONOCYTES NFR BLD AUTO: 9 %
NEUTROPHILS # BLD AUTO: 4.6 X10E3/UL (ref 1.4–7)
NEUTROPHILS NFR BLD AUTO: 58 %
PLATELET # BLD AUTO: 265 X10E3/UL (ref 150–450)
POTASSIUM SERPL-SCNC: 4.3 MMOL/L (ref 3.5–5.2)
PROT SERPL-MCNC: 7.3 G/DL (ref 6–8.5)
RBC # BLD AUTO: 5.01 X10E6/UL (ref 4.14–5.8)
SODIUM SERPL-SCNC: 141 MMOL/L (ref 134–144)
TRIGL SERPL-MCNC: 83 MG/DL (ref 0–149)
VLDLC SERPL CALC-MCNC: 16 MG/DL (ref 5–40)
WBC # BLD AUTO: 7.8 X10E3/UL (ref 3.4–10.8)

## 2022-09-16 DIAGNOSIS — F32.4 MAJOR DEPRESSIVE DISORDER WITH SINGLE EPISODE, IN PARTIAL REMISSION (HCC): ICD-10-CM

## 2022-09-16 RX ORDER — SERTRALINE HYDROCHLORIDE 100 MG/1
TABLET, FILM COATED ORAL
Qty: 90 TABLET | Refills: 3 | Status: SHIPPED | OUTPATIENT
Start: 2022-09-16

## 2022-09-19 NOTE — PROGRESS NOTES
1. \"Have you been to the ER, urgent care clinic since your last visit? Hospitalized since your last visit? \" Yes Where: [facility] 5/3/2022 for angioplasty    2. \"Have you seen or consulted any other health care providers outside of the [address] [place] [address] since your last visit? \" Yes Where: dermatology, urology, cardiology      3. For patients aged 39-70: Has the patient had a colonoscopy / FIT/ Cologuard? Yes - no Care Gap present-due 12/2026      If the patient is female:    4. For patients aged 41-77: Has the patient had a mammogram within the past 2 years? NA - based on age or sex      11. For patients aged 21-65: Has the patient had a pap smear? NA - based on age or sex    Physician order obtained. Patient completed adult immunization consent form. Allergies, contraindications and recommendations reviewed with patient. Prevnar 20 vaccine administered IM right deltoid. Patient tolerated well. Patient remained in office for 15 minutes after injection and no adverse reactions were noted.

## 2022-09-20 ENCOUNTER — OFFICE VISIT (OUTPATIENT)
Dept: FAMILY MEDICINE CLINIC | Age: 57
End: 2022-09-20
Payer: COMMERCIAL

## 2022-09-20 VITALS
SYSTOLIC BLOOD PRESSURE: 124 MMHG | HEIGHT: 69 IN | TEMPERATURE: 97.6 F | BODY MASS INDEX: 33.77 KG/M2 | WEIGHT: 228 LBS | DIASTOLIC BLOOD PRESSURE: 76 MMHG | OXYGEN SATURATION: 98 % | RESPIRATION RATE: 16 BRPM | HEART RATE: 68 BPM

## 2022-09-20 DIAGNOSIS — R51.9 CHRONIC DAILY HEADACHE: ICD-10-CM

## 2022-09-20 DIAGNOSIS — N20.0 KIDNEY STONE: ICD-10-CM

## 2022-09-20 DIAGNOSIS — K63.5 POLYP OF COLON, UNSPECIFIED PART OF COLON, UNSPECIFIED TYPE: ICD-10-CM

## 2022-09-20 DIAGNOSIS — R73.03 PREDIABETES: ICD-10-CM

## 2022-09-20 DIAGNOSIS — E78.5 HYPERLIPIDEMIA, UNSPECIFIED HYPERLIPIDEMIA TYPE: ICD-10-CM

## 2022-09-20 DIAGNOSIS — K21.9 GASTROESOPHAGEAL REFLUX DISEASE WITHOUT ESOPHAGITIS: ICD-10-CM

## 2022-09-20 DIAGNOSIS — N40.0 BENIGN PROSTATIC HYPERPLASIA, UNSPECIFIED WHETHER LOWER URINARY TRACT SYMPTOMS PRESENT: ICD-10-CM

## 2022-09-20 DIAGNOSIS — F32.4 MAJOR DEPRESSIVE DISORDER WITH SINGLE EPISODE, IN PARTIAL REMISSION (HCC): ICD-10-CM

## 2022-09-20 DIAGNOSIS — Z99.89 OSA ON CPAP: ICD-10-CM

## 2022-09-20 DIAGNOSIS — Z23 ENCOUNTER FOR IMMUNIZATION: ICD-10-CM

## 2022-09-20 DIAGNOSIS — I10 ESSENTIAL HYPERTENSION WITH GOAL BLOOD PRESSURE LESS THAN 140/90: Primary | ICD-10-CM

## 2022-09-20 DIAGNOSIS — E66.9 OBESITY WITH SERIOUS COMORBIDITY, UNSPECIFIED CLASSIFICATION, UNSPECIFIED OBESITY TYPE: ICD-10-CM

## 2022-09-20 DIAGNOSIS — G47.33 OSA ON CPAP: ICD-10-CM

## 2022-09-20 PROCEDURE — 99214 OFFICE O/P EST MOD 30 MIN: CPT | Performed by: FAMILY MEDICINE

## 2022-09-20 PROCEDURE — 90677 PCV20 VACCINE IM: CPT | Performed by: FAMILY MEDICINE

## 2022-09-20 PROCEDURE — 90471 IMMUNIZATION ADMIN: CPT | Performed by: FAMILY MEDICINE

## 2022-09-20 RX ORDER — NORTRIPTYLINE HYDROCHLORIDE 50 MG/1
CAPSULE ORAL
Qty: 90 CAPSULE | Refills: 0 | Status: SHIPPED | OUTPATIENT
Start: 2022-09-20 | End: 2022-10-31

## 2022-09-20 NOTE — PROGRESS NOTES
SUBJECTIVE  Chief Complaint   Patient presents with    Results    Hypertension      He has metabolic disease in the way of hypertension, prediabetes, hyperlipidemia in the setting of obesity. He reports compliance with his current medications without side effects. He has depression that is well-controlled on Zoloft. He has no side effects. No harmful ideations outwardly expressed. He has GERD controlled on PPIs but he says he seldom has to take them. He has had colorectal cancer screening updated at the end of 2021. He has sleep apnea and chronic daily headaches no longer fully controlled on Pamelor. He missed for a while and it got much worse. Now that he is back on, he feels it does not work as well. He is on CPAP. He has BPH and a family history of prostate CA. He has had kidney stones as well and sees a urologist.     OBJECTIVE    Blood pressure 124/76, pulse 68, temperature 97.6 °F (36.4 °C), temperature source Temporal, resp. rate 16, height 5' 9\" (1.753 m), weight 228 lb (103.4 kg), SpO2 98 %. General:  Alert, cooperative, well appearing, in no apparent distress. CV:  The heart sounds are regular in rate and rhythm. There is a normal S1 and S2. There or no murmurs  Lungs: Inspiratory and expiratory efforts are full and unlabored. Lung sounds are clear and equal to auscultation throughout all lung fields without wheezing, rales, or rhonchi. Skin:  No rashes, no jaundice. Psych: normal affect. Mood good. Oriented x 3. Judgement and insight intact.       Latest Reference Range & Units 9/12/22 00:00   WBC 3.4 - 10.8 x10E3/uL 7.8   RBC 4.14 - 5.80 x10E6/uL 5.01   HGB 13.0 - 17.7 g/dL 14.4   HCT 37.5 - 51.0 % 42.7   MCV 79 - 97 fL 85   MCH 26.6 - 33.0 pg 28.7   MCHC 31.5 - 35.7 g/dL 33.7   RDW 11.6 - 15.4 % 13.5   PLATELET 766 - 320 K16A1/   NEUTROPHILS Not Estab. % 58   Lymphocytes Not Estab. % 30   MONOCYTES Not Estab. % 9   EOSINOPHILS Not Estab. % 2   BASOPHILS Not Estab. % 1   IMMATURE GRANULOCYTES Not Estab. % 0   ABS. NEUTROPHILS 1.4 - 7.0 x10E3/uL 4.6   ABS. IMM. GRANS. 0.0 - 0.1 x10E3/uL 0.0   Abs Lymphocytes 0.7 - 3.1 x10E3/uL 2.3   ABS. MONOCYTES 0.1 - 0.9 x10E3/uL 0.7   ABS. EOSINOPHILS 0.0 - 0.4 x10E3/uL 0.2   ABS. BASOPHILS 0.0 - 0.2 x10E3/uL 0.1   Sodium 134 - 144 mmol/L 141   Potassium 3.5 - 5.2 mmol/L 4.3   Chloride 96 - 106 mmol/L 101   CO2 20 - 29 mmol/L 25   Glucose 65 - 99 mg/dL 87   BUN 6 - 24 mg/dL 15   Creatinine 0.76 - 1.27 mg/dL 1.02   BUN/Creatinine ratio 9 - 20  15   Calcium 8.7 - 10.2 mg/dL 9.7   eGFR >59 mL/min/1.73 85   Bilirubin, total 0.0 - 1.2 mg/dL 0.3   Protein, total 6.0 - 8.5 g/dL 7.3   Albumin 3.8 - 4.9 g/dL 4.6   A-G Ratio 1.2 - 2.2  1.7   ALT 0 - 44 IU/L 30   AST 0 - 40 IU/L 27   Alk. phosphatase 44 - 121 IU/L 101   Triglyceride 0 - 149 mg/dL 83   Cholesterol, total 100 - 199 mg/dL 133   HDL Cholesterol >39 mg/dL 39 (L)   LDL, calculated 0 - 99 mg/dL 78   VLDL, calculated 5 - 40 mg/dL 16   Hemoglobin A1c, (calculated) 4.8 - 5.6 % 6.0 (H)   (L): Data is abnormally low  (H): Data is abnormally high    ASSESSMENT / PLAN    ICD-10-CM ICD-9-CM    1. Essential hypertension with goal blood pressure less than 140/90  I10 401.9       2. Prediabetes  R73.03 790.29       3. Hyperlipidemia, unspecified hyperlipidemia type  E78.5 272.4       4. Obesity with serious comorbidity, unspecified classification, unspecified obesity type  E66.9 278.00       5. Gastroesophageal reflux disease without esophagitis  K21.9 530.81       6. DB on CPAP  G47.33 327.23     Z99.89 V46.8       7. Chronic daily headache  R51.9 784.0 nortriptyline (PAMELOR) 50 mg capsule      8. Major depressive disorder with single episode, in partial remission ([facility]  F32.4 296.25       9. Benign prostatic hyperplasia, unspecified whether lower urinary tract symptoms present  N40.0 600.00       10. Kidney stone  N20.0 592.0       11.  Polyp of colon, unspecified part of colon, unspecified type  K63.5 211.3       12. Encounter for immunization  Z23 V03.89 CT IMMUNIZ ADMIN,1 SINGLE/COMB VAC/TOXOID      PNEUMOCOCCAL, PCV20, PREVNAR 20, (AGE 25 YRS+), IM, PF        HTN / prediabetes / hyperlipidemia / obesity - cont current care. Encourage diet and exercise. A1c showing stability. Chronic daily HAs / DB - has seen the sleep specialist.  HAs not controlled. Inc to pamelor 50mg daily. Refills as needed. GERD - cont PPI as needed. Refills as needed. Depression - controlled on Zoloft 100mg daily. Refills as needed. BPH / kidney stones - cont per urology. Notes reviewed. Agree with care plan. Colon polyps - reviewed latest CRCS. Up to date. PVC20 administered. All chart history elements were reviewed by me at the time of the visit even though marked at time of note closure. Patient understands our medical plan. Patient has provided input and agrees with goals. Alternatives have been explained and offered. All questions answered. The patient is to call if condition worsens or fails to improve. Follow-up and Dispositions    Return in about 6 months (around 3/20/2023) for routine care. A1c to be done in office.

## 2022-09-20 NOTE — PATIENT INSTRUCTIONS
A Healthy Lifestyle: Care Instructions  Your Care Instructions     A healthy lifestyle can help you feel good, stay at a healthy weight, and have plenty of energy for both work and play. A healthy lifestyle is something you can share with your whole family. A healthy lifestyle also can lower your risk for serious health problems, such as high blood pressure, heart disease, and diabetes. You can follow a few steps listed below to improve your health and the health of your family. Follow-up care is a key part of your treatment and safety. Be sure to make and go to all appointments, and call your doctor if you are having problems. It's also a good idea to know your test results and keep a list of the medicines you take. How can you care for yourself at home? Do not eat too much sugar, fat, or fast foods. You can still have dessert and treats now and then. The goal is moderation. Start small to improve your eating habits. Pay attention to portion sizes, drink less juice and soda pop, and eat more fruits and vegetables. Eat a healthy amount of food. A 3-ounce serving of meat, for example, is about the size of a deck of cards. Fill the rest of your plate with vegetables and whole grains. Limit the amount of soda and sports drinks you have every day. Drink more water when you are thirsty. Eat plenty of fruits and vegetables every day. Have an apple or some carrot sticks as an afternoon snack instead of a candy bar. Try to have fruits and/or vegetables at every meal.  Make exercise part of your daily routine. You may want to start with simple activities, such as walking, bicycling, or slow swimming. Try to be active 30 to 60 minutes every day. You do not need to do all 30 to 60 minutes all at once. For example, you can exercise 3 times a day for 10 or 20 minutes. Moderate exercise is safe for most people, but it is always a good idea to talk to your doctor before starting an exercise program.  Keep moving.  Andres Archibald the lawn, work in the garden, or clean your house. Take the stairs instead of the elevator at work. If you smoke, quit. People who smoke have an increased risk for heart attack, stroke, cancer, and other lung illnesses. Quitting is hard, but there are ways to boost your chance of quitting tobacco for good. Use nicotine gum, patches, or lozenges. Ask your doctor about stop-smoking programs and medicines. Keep trying. In addition to reducing your risk of diseases in the future, you will notice some benefits soon after you stop using tobacco. If you have shortness of breath or asthma symptoms, they will likely get better within a few weeks after you quit. Limit how much alcohol you drink. Moderate amounts of alcohol (up to 2 drinks a day for men, 1 drink a day for women) are okay. But drinking too much can lead to liver problems, high blood pressure, and other health problems. Family health  If you have a family, there are many things you can do together to improve your health. Eat meals together as a family as often as possible. Eat healthy foods. This includes fruits, vegetables, lean meats and dairy, and whole grains. Include your family in your fitness plan. Most people think of activities such as jogging or tennis as the way to fitness, but there are many ways you and your family can be more active. Anything that makes you breathe hard and gets your heart pumping is exercise. Here are some tips:  Walk to do errands or to take your child to school or the bus. Go for a family bike ride after dinner instead of watching TV. Where can you learn more? Go to http://www.gray.com/  Enter B530 in the search box to learn more about \"A Healthy Lifestyle: Care Instructions. \"  Current as of: June 16, 2021               Content Version: 13.2  © 0037-6569 [employer].    Care instructions adapted under license by [employer] (which disclaims liability or warranty for this information). If you have questions about a medical condition or this instruction, always ask your healthcare professional. [place] [address] any warranty or liability for your use of this information.

## 2022-10-27 DIAGNOSIS — I10 ESSENTIAL HYPERTENSION WITH GOAL BLOOD PRESSURE LESS THAN 140/90: Primary | ICD-10-CM

## 2022-10-27 RX ORDER — ISOSORBIDE MONONITRATE 60 MG/1
60 TABLET, EXTENDED RELEASE ORAL
Qty: 90 TABLET | Refills: 3 | Status: SHIPPED | OUTPATIENT
Start: 2022-10-27

## 2022-10-27 NOTE — TELEPHONE ENCOUNTER
Requested Prescriptions     Pending Prescriptions Disp Refills    isosorbide mononitrate ER (IMDUR) 60 mg CR tablet 90 Tablet 5     Sig: Take 1 Tablet by mouth every morning.

## 2022-10-31 DIAGNOSIS — R51.9 CHRONIC DAILY HEADACHE: ICD-10-CM

## 2022-10-31 RX ORDER — NORTRIPTYLINE HYDROCHLORIDE 50 MG/1
CAPSULE ORAL
Qty: 90 CAPSULE | Refills: 3 | Status: SHIPPED | OUTPATIENT
Start: 2022-10-31

## 2022-11-01 DIAGNOSIS — K21.9 GASTROESOPHAGEAL REFLUX DISEASE WITHOUT ESOPHAGITIS: ICD-10-CM

## 2022-11-01 RX ORDER — OMEPRAZOLE 40 MG/1
40 CAPSULE, DELAYED RELEASE ORAL DAILY
Qty: 90 CAPSULE | Refills: 1 | Status: SHIPPED | OUTPATIENT
Start: 2022-11-01

## 2022-11-01 NOTE — TELEPHONE ENCOUNTER
This pharmacy faxed over request for the following prescriptions to be filled:    Medication requested :   Requested Prescriptions     Pending Prescriptions Disp Refills    omeprazole (PRILOSEC) 40 mg capsule 90 Capsule 1     Sig: Take 1 Capsule by mouth daily.      PCP: Yuliana Gagnon 39. or Print: [facility]   Mail order or Local pharmacy mail order      Scheduled appointment if not seen by current providers in office: LOV 9/20/2022 F/U 3/20/2023

## 2022-11-30 ENCOUNTER — OFFICE VISIT (OUTPATIENT)
Dept: CARDIOLOGY CLINIC | Age: 57
End: 2022-11-30
Payer: COMMERCIAL

## 2022-11-30 VITALS
HEART RATE: 69 BPM | DIASTOLIC BLOOD PRESSURE: 79 MMHG | WEIGHT: 230 LBS | SYSTOLIC BLOOD PRESSURE: 137 MMHG | BODY MASS INDEX: 34.07 KG/M2 | HEIGHT: 69 IN

## 2022-11-30 DIAGNOSIS — I25.10 CORONARY ARTERY DISEASE INVOLVING NATIVE CORONARY ARTERY OF NATIVE HEART WITHOUT ANGINA PECTORIS: Primary | ICD-10-CM

## 2022-11-30 DIAGNOSIS — I10 ESSENTIAL HYPERTENSION WITH GOAL BLOOD PRESSURE LESS THAN 140/90: ICD-10-CM

## 2022-11-30 DIAGNOSIS — E78.5 HYPERLIPIDEMIA, UNSPECIFIED HYPERLIPIDEMIA TYPE: ICD-10-CM

## 2022-11-30 DIAGNOSIS — G47.33 OSA ON CPAP: ICD-10-CM

## 2022-11-30 DIAGNOSIS — Z99.89 OSA ON CPAP: ICD-10-CM

## 2022-11-30 DIAGNOSIS — R55 VASOVAGAL SYNCOPE: ICD-10-CM

## 2022-11-30 PROCEDURE — 3078F DIAST BP <80 MM HG: CPT | Performed by: INTERNAL MEDICINE

## 2022-11-30 PROCEDURE — 99214 OFFICE O/P EST MOD 30 MIN: CPT | Performed by: INTERNAL MEDICINE

## 2022-11-30 PROCEDURE — 3074F SYST BP LT 130 MM HG: CPT | Performed by: INTERNAL MEDICINE

## 2022-11-30 NOTE — PROGRESS NOTES
1. Have you been to the ER, urgent care clinic since your last visit? Hospitalized since your last visit? No    2. Have you seen or consulted any other health care providers outside of the [address] [place] [address] since your last visit? Include any pap smears or colon screening.  No

## 2022-11-30 NOTE — PROGRESS NOTES
HISTORY OF PRESENT ILLNESS  Rosi Godinez is a 59 y.o. male. Patient with cad,coronary spasm,htn  On follow up patient denies any sob, palpitation or other significant symptoms. 2/2020  Seen for follow-up after recent admission. Episode of syncope started while he was standing in line for longer time started with dizziness subsequent syncope. Occasional lightheaded when standing in 1 position. He had positive tilt table in 2013. No recent syncopal episodes. 1/2021  Patient seen today for follow-up. Complains of chest tightness on the left side. Under significant stress at present. Also feels malaise and fatigue. 4/2022  Patient with h/o coronary spasm, seen today for complaints of chest discomfort with shortness of breath. This has been ongoing x 2 weeks - worse with physical activity and resolves with rest or nitroglycerin. He reports ongoing rare palpitations, denies edema. Follow-up  Associated symptoms include chest pain and shortness of breath. Hypertension  The history is provided by the Patient. This is a chronic problem. The problem occurs constantly. The problem has not changed since onset. Associated symptoms include chest pain and shortness of breath. Cholesterol Problem  The history is provided by the Patient. This is a chronic problem. The problem occurs constantly. The problem has not changed since onset. Associated symptoms include chest pain and shortness of breath. Chest Pain (Angina)   The history is provided by the Patient. This is a recurrent problem. The problem has been gradually worsening. The problem occurs every several days. The pain is associated with exertion and rest. The pain is present in the substernal region. The pain is mild. The quality of the pain is described as pressure-like. The pain does not radiate. Associated symptoms include palpitations and shortness of breath.  Pertinent negatives include no claudication, no cough, no dizziness, no fever, no hemoptysis, no nausea, no orthopnea, no PND, no sputum production, no vomiting and no weakness. He has tried nitroglycerin for the symptoms. Review of Systems   Constitutional:  Negative for chills and fever. HENT:  Negative for nosebleeds. Eyes:  Negative for blurred vision and double vision. Respiratory:  Positive for shortness of breath. Negative for cough, hemoptysis, sputum production and wheezing. Cardiovascular:  Positive for chest pain and palpitations. Negative for orthopnea, claudication, leg swelling and PND. Gastrointestinal:  Negative for heartburn, nausea and vomiting. Musculoskeletal:  Negative for myalgias. Skin:  Negative for rash. Neurological:  Negative for dizziness and weakness. Endo/Heme/Allergies:  Does not bruise/bleed easily.    Family History   Problem Relation Age of Onset    Hypertension Mother     Diabetes Mother     Hypertension Father     Cancer Father         Prostate    Heart Disease Father     Heart Attack Father     Stroke Brother     Migraines Brother     Cancer Sister         Breast    Colon Polyps Sister     Pancreatic Cancer Sister     Migraines Sister     Stroke Brother        Past Medical History:   Diagnosis Date    Abnormal tilt table test 2013    Back pain     Back pain     CAD (coronary artery disease)     Coronary artery spasm ([address]     Difficult intubation     15 years ago during thumb sx, no problem since then    GERD (gastroesophageal reflux disease)     Glaucoma     Hearing loss 2021    has an audiologist, has hearing aids    Hypertension     Kidney stone     Migraine     migraines    Prediabetes     S/P colonoscopic polypectomy 09/2016, 12/1/2021    Sleep apnea     uses cpap    Syncope     has had recurrent issues, has had work-up to include EEG, monitor, tilt table, Carotids, ECHO       Past Surgical History:   Procedure Laterality Date    COLONOSCOPY N/A 9/30/2016    COLONOSCOPY with biopsy performed by Jay Ortez MD at [facility] ENDOSCOPY COLONOSCOPY N/A 12/1/2021    COLONOSCOPY performed by Kendy Longoria MD at [facility] ENDOSCOPY    [address] [place]      x3    HX COLONOSCOPY  09/2016    Dr. Cortez Clinton      reportedly normal by patient    HX HEENT      sinus sx    HX KNEE REPLACEMENT Right 08/13/2020    Dr Berenice Samson      left shoulder x 3, right knee, left thumb, left great toe sx    HX ORTHOPAEDIC Right     elbow    HX UROLOGICAL      Lithotripsy    AK CARDIAC SURG PROCEDURE UNLIST      cardiac catheterization times 5       Social History     Tobacco Use    Smoking status: Never    Smokeless tobacco: Never   Substance Use Topics    Alcohol use: Yes     Comment: rare use       No Known Allergies        Visit Vitals  /79 (BP 1 Location: Left upper arm, BP Patient Position: Sitting, BP Cuff Size: Adult)   Pulse 69   Ht 5' 9\" (1.753 m)   Wt 104.3 kg (230 lb)   BMI 33.97 kg/m²         Physical Exam  Vitals and nursing note reviewed. Constitutional:       Appearance: He is well-developed. HENT:      Head: Normocephalic and atraumatic. Eyes:      Conjunctiva/sclera: Conjunctivae normal.   Neck:      Thyroid: No thyromegaly. Vascular: No JVD. Trachea: No tracheal deviation. Cardiovascular:      Rate and Rhythm: Normal rate and regular rhythm. Heart sounds: Normal heart sounds. No murmur heard. No friction rub. No gallop. Pulmonary:      Effort: No respiratory distress. Breath sounds: Normal breath sounds. No wheezing or rales. Chest:      Chest wall: No tenderness. Abdominal:      Palpations: Abdomen is soft. Tenderness: There is no abdominal tenderness. Musculoskeletal:      Cervical back: Neck supple. Right lower leg: No edema. Left lower leg: No edema. Skin:     General: Skin is warm and dry. Neurological:      Mental Status: He is alert and oriented to person, place, and time. Mr. Brittany Fisher has a reminder for a \"due or due soon\" health maintenance.  I have asked that he contact his primary care provider for follow-up on this health maintenance. Cath-2004  rca spasm-  lcx 30-40%  Stress test-2015  Fixed ant defect  Echo-2015  Normal lvef  ekg-7/2016-  wnl  I Have personally reviewed recent relevant labs available and discussed with patient  5/2019-BMP, LFT, lipids. LDL-49    Echo Cardiogram Complete2/3/2020  [address]  Component Name Value Ref Range   EF Echo 60     Result Impression   :   NORMAL LEFT VENTRICULAR CAVITY SIZE AND SYSTOLIC FUNCTION WITH AN EJECTION FRACTION OF  60 %. ABNORMAL DIASTOLIC FILLING PATTERN. MITRAL ANNULAR CALCIFICATION WITH MILD MITRAL REGURGITATION. SCLEROTIC TRILEAFLET AORTIC VALVE WITHOUT EVIDENCE OF STENOSIS. TRACE OF TRICUSPID REGURGITATION WITH A RVSP OF 38 MMHG. STRUCTURALLY NORMAL PULMONIC VALVE WITH TRACE PULMONIC REGURGITATION. NO EVIDENCE OF PERICARDIAL EFFUSION. NO MASSES, SHUNTS OR THROMBI SEEN. NO PREVIOUS REPORT FOR COMPARISON. 2/2020- carotid artery PVL  Conclusions: Normal extracranial carotid and vertebral duplex examination. I Have personally reviewed recent relevant labs available and discussed with patient  2/2021  Ni Rosenthal MD ECG Norton, SPECT Norton, Test Supervisor 4/14/2022       Interpretation Summary         Nuclear Findings: Normal pharmacological myocardial perfusion study. LVEF measures 53%. Nuclear Findings: LVEF measures 53%. LV perfusion is normal.    Nuclear Findings: Normal left ventricular systolic function post-stress. LVEF measures 53%. ECG: Resting ECG demonstrates normal sinus rhythm. ECG: Stress ECG was negative for ischemia. Stress Test: A pharmacological stress test was performed using lexiscan. Hemodynamics are adequate for diagnosis. Blood pressure demonstrated a normal response and heart rate demonstrated a blunted response to stress.  The patient's heart rate recovery was normal. The patient reported no symptoms during the stress test. Interpretation Summary 4/2022         Left Ventricle: Normal left ventricular systolic function with a visually estimated EF of 55 - 60%. Left ventricle size is normal. Mildly increased wall thickness. Findings consistent with mild concentric hypertrophy. Normal wall motion. Diastolic dysfunction present with normal LV EF. Mitral Valve: Mildly thickened leaflet. Mild annular calcification of the mitral valve. Mild regurgitation. Left Atrium: Left atrium is mildly dilated. LA Vol Index A/L is 38 mL/m2. Conclusion cardiac cath 5/2022       No evidence of any significant critical disease in the coronary arteries other than diffuse luminal irregularities. Medical treatment and risk factor modification to continue. Assessment         ICD-10-CM ICD-9-CM    1. Coronary artery disease involving native coronary artery of native heart without angina pectoris  I25.10 414.01     Stable symptom continue treatment monitor      2. Essential hypertension with goal blood pressure less than 140/90  I10 401.9     Controlled continue treatment      3. Hyperlipidemia, unspecified hyperlipidemia type  E78.5 272.4     Continue treatment lab with PCP      4. DB on CPAP  G47.33 327.23     Z99.89 V46.8     Continue treatment follow-up with sleep physician      5. Vasovagal syncope  R55 780.2     Stable no recurrence      Had tried cardizem in past -had drop in bp  No acei/normal lvef  2/2019  Angina significantly improved on addition of Imdur. Blood pressure controlled continue medical management  In 2019  Cardiac status stable. Angina stable. He has orthostatic dizziness. Likely from neuropathy. Will use support stocking. Consider additional medication if required if symptoms are persistent  2/2020  Recent syncope clinically vasovagal.  History of positive tilt table in 2013. Will discontinue amlodipine and monitor as blood pressure on low normal side. Monitor for angina and spasm.   Continue with support stocking. Pressure maneuvers and if needed midodrine  7/2020  Cardiac status stable. Okay to proceed with knee surgery with medium cardiac risk. No recurrence of syncope or angina  1/2021  Recent increase in chest tightness. Currently takes Imdur 60 mg increased amlodipine to 10 mg a day. Monitor blood pressure. Ordered lab including TSH LFT and lipids and BMP  5/2021  Episodes of palpitation and tachycardia noted. We will change amlodipine to diltiazem. Continue other treatment and monitor. Blood pressure starts elevating might need a combination of diltiazem and amlodipine  6/2021  Cardiac status stable. Blood pressure much better controlled. Heart rate better. Palpitations are better continue treatment  11/2021  Stable cardiac status. Intermittent use of nitroglycerin. Continue current medical management  4/2022  Patient seen with c/o increased episodes of chest pain with dyspnea with worsening exercise tolerance. He is using SL nitroglycerin more often. Will evaluate with stress test and echo. B/P controlled. EKG SR with T wave abnormalities. Continue current medications. 4/22/2022  Continues to have progression anginal pain requiring multiple nitroglycerin. Class III. Even though stress test is negative, concerned about progressive CAD and ischemia. We will proceed with cardiac cath  11/2022  Cardiac status stable continue medical management monitor    There are no discontinued medications. No orders of the defined types were placed in this encounter. Follow-up and Dispositions    Return in about 6 months (around 5/30/2023).

## 2022-12-29 ENCOUNTER — OFFICE VISIT (OUTPATIENT)
Dept: FAMILY MEDICINE CLINIC | Age: 57
End: 2022-12-29
Payer: COMMERCIAL

## 2022-12-29 ENCOUNTER — TELEPHONE (OUTPATIENT)
Dept: FAMILY MEDICINE CLINIC | Age: 57
End: 2022-12-29

## 2022-12-29 VITALS
SYSTOLIC BLOOD PRESSURE: 110 MMHG | OXYGEN SATURATION: 97 % | DIASTOLIC BLOOD PRESSURE: 76 MMHG | BODY MASS INDEX: 34.07 KG/M2 | WEIGHT: 230 LBS | RESPIRATION RATE: 18 BRPM | HEART RATE: 86 BPM | HEIGHT: 69 IN | TEMPERATURE: 98.4 F

## 2022-12-29 DIAGNOSIS — K21.9 GASTROESOPHAGEAL REFLUX DISEASE WITHOUT ESOPHAGITIS: ICD-10-CM

## 2022-12-29 DIAGNOSIS — E66.9 OBESITY WITH SERIOUS COMORBIDITY, UNSPECIFIED CLASSIFICATION, UNSPECIFIED OBESITY TYPE: Primary | ICD-10-CM

## 2022-12-29 DIAGNOSIS — G47.33 OSA ON CPAP: ICD-10-CM

## 2022-12-29 DIAGNOSIS — I10 ESSENTIAL HYPERTENSION WITH GOAL BLOOD PRESSURE LESS THAN 140/90: ICD-10-CM

## 2022-12-29 DIAGNOSIS — Z99.89 OSA ON CPAP: ICD-10-CM

## 2022-12-29 DIAGNOSIS — R73.03 PREDIABETES: ICD-10-CM

## 2022-12-29 PROCEDURE — 3078F DIAST BP <80 MM HG: CPT | Performed by: FAMILY MEDICINE

## 2022-12-29 PROCEDURE — 3074F SYST BP LT 130 MM HG: CPT | Performed by: FAMILY MEDICINE

## 2022-12-29 PROCEDURE — 99213 OFFICE O/P EST LOW 20 MIN: CPT | Performed by: FAMILY MEDICINE

## 2022-12-29 NOTE — TELEPHONE ENCOUNTER
Fax received from [address] [place] [address] meds stating prior [employer] is required for the Saxenda 18MG/3ML Pen-Injectors. Submit a PA request  1. Go to key. [employer] and click \"Enter a Key\"  2. Patient last name: Tere Brewster      : 1965      Key: [id number]  3.  Click \"start a PA\", complete the form, and \"send to plan\"

## 2022-12-29 NOTE — PROGRESS NOTES
SUBJECTIVE  Chief Complaint   Patient presents with    Weight Management    Weight Loss     Patient presents for a discussion about obesity. His wife has started [place] for weight loss and he would like to try similar. He has tried various weight loss strategies over the years but lately has been passive with it. He is interested in [facility]. He has several co-morbidities. OBJECTIVE    Blood pressure 110/76, pulse 86, temperature 98.4 °F (36.9 °C), resp. rate 18, height 5' 9\" (1.753 m), weight 230 lb (104.3 kg), SpO2 97 %. General:  Alert, cooperative, well appearing, in no apparent distress. ASSESSMENT / PLAN    ICD-10-CM ICD-9-CM    1. Obesity with serious comorbidity, unspecified classification, unspecified obesity type  E66.9 278.00 liraglutide, weight loss, (SAXENDA) 3 mg/0.5 mL (18 mg/3 mL) pen      2. Gastroesophageal reflux disease without esophagitis  K21.9 530.81       3. Essential hypertension with goal blood pressure less than 140/90  I10 401.9       4. Prediabetes  R73.03 790.29       5. DB on CPAP  G47.33 327.23     Z99.89 V46.8         Start saxenda, especially in light of his co-morbidities. We discussed starting at a 0.6mg dose and increasing by that much weekly until he gets to 3mg daily. Advised on risks associated including the black box warning. We discussed a 16 week follow-up to assess a goal weight loss of 4%. We can move his chronic care follow-up from March to April due to this needed weight loss follow-up. All chart history elements were reviewed by me at the time of the visit even though marked at time of note closure. Patient understands our medical plan. Patient has provided input and agrees with goals. Alternatives have been explained and offered. All questions answered. The patient is to call if condition worsens or fails to improve. Follow-up and Dispositions    Return in about 16 weeks (around 4/20/2023).

## 2022-12-29 NOTE — PROGRESS NOTES
Chief Complaint   Patient presents with    Weight Management    Weight Loss      Visit Vitals  /76 (BP 1 Location: Right upper arm, BP Patient Position: Sitting, BP Cuff Size: Adult)   Pulse 86   Temp 98.4 °F (36.9 °C)   Resp 18   Ht 5' 9\" (1.753 m)   Wt 233 lb (105.7 kg)   SpO2 97%   BMI 34.41 kg/m²      PHQ 9 Score: 0 (12/29/2022  1:22 PM)  Thoughts of being better off dead, or hurting yourself in some way: 0 (12/29/2022  1:22 PM)     Fall Risk Assessment, last 12 mths 12/29/2022   Able to walk? No   Fall in past 12 months? -       Abuse Screening Questionnaire 12/29/2022   Do you ever feel afraid of your partner? N   Are you in a relationship with someone who physically or mentally threatens you? N   Is it safe for you to go home? Y      1. \"Have you been to the ER, urgent care clinic since your last visit? Hospitalized since your last visit? \" No    2. \"Have you seen or consulted any other health care providers outside of the [address] [place] [address] since your last visit? \" No     3. For patients aged 39-70: Has the patient had a colonoscopy / FIT/ Cologuard? Yes - no Care Gap present      If the patient is female:    4. For patients aged 41-77: Has the patient had a mammogram within the past 2 years? NA - based on age or sex      11. For patients aged 21-65: Has the patient had a pap smear?  NA - based on age or sex

## 2023-01-06 NOTE — TELEPHONE ENCOUNTER
Prior authorization approved for [facility]. Prior authorization valid 01/06/2023 - 07/06/2023. [employer] notified via voicemail.

## 2023-01-09 DIAGNOSIS — E66.9 OBESITY WITH SERIOUS COMORBIDITY, UNSPECIFIED CLASSIFICATION, UNSPECIFIED OBESITY TYPE: ICD-10-CM

## 2023-01-09 NOTE — TELEPHONE ENCOUNTER
This patient contacted office for the following prescriptions to be filled:    Medication requested :   Requested Prescriptions     Pending Prescriptions Disp Refills    liraglutide, weight loss, (SAXENDA) 3 mg/0.5 mL (18 mg/3 mL) pen 3 Each 1     Sig: 3 mg by SubCUTAneous route daily.  Indications: weight loss management for an obese person      PCP: KAM Edouard [address] or Print: [address] [place] [address]  Mail order or Local pharmacy: Mail Order    Scheduled appointment if not seen by current providers in office: [address] [place] [address] 12-, Sanaz Parham 04-

## 2023-04-19 PROBLEM — N40.0 BPH (BENIGN PROSTATIC HYPERPLASIA): Status: ACTIVE | Noted: 2020-02-03

## 2023-04-25 ENCOUNTER — HOSPITAL ENCOUNTER (OUTPATIENT)
Facility: HOSPITAL | Age: 58
Discharge: HOME | End: 2023-04-28

## 2023-04-25 PROCEDURE — 99001 SPECIMEN HANDLING PT-LAB: CPT

## 2023-04-26 LAB
ALBUMIN SERPL-MCNC: 4.7 G/DL (ref 3.8–4.9)
ALBUMIN SERPL-MCNC: 4.7 G/DL (ref 3.8–4.9)
ALBUMIN/GLOB SERPL: 1.7 {RATIO} (ref 1.2–2.2)
ALBUMIN/GLOB SERPL: 1.7 {RATIO} (ref 1.2–2.2)
ALP SERPL-CCNC: 93 IU/L (ref 44–121)
ALP SERPL-CCNC: 93 IU/L (ref 44–121)
ALT SERPL-CCNC: 27 IU/L (ref 0–44)
ALT SERPL-CCNC: 27 IU/L (ref 0–44)
AST SERPL-CCNC: 24 IU/L (ref 0–40)
AST SERPL-CCNC: 24 IU/L (ref 0–40)
BASOPHILS # BLD AUTO: 0.1 X10E3/UL (ref 0–0.2)
BASOPHILS # BLD AUTO: 0.1 X10E3/UL (ref 0–0.2)
BASOPHILS NFR BLD AUTO: 1 %
BASOPHILS NFR BLD AUTO: 1 %
BILIRUB SERPL-MCNC: <0.2 MG/DL (ref 0–1.2)
BILIRUB SERPL-MCNC: <0.2 MG/DL (ref 0–1.2)
BUN SERPL-MCNC: 15 MG/DL (ref 6–24)
BUN SERPL-MCNC: 15 MG/DL (ref 6–24)
BUN/CREAT SERPL: 14 (ref 9–20)
BUN/CREAT SERPL: 14 (ref 9–20)
CALCIUM SERPL-MCNC: 9.8 MG/DL (ref 8.7–10.2)
CALCIUM SERPL-MCNC: 9.8 MG/DL (ref 8.7–10.2)
CHLORIDE SERPL-SCNC: 103 MMOL/L (ref 96–106)
CHLORIDE SERPL-SCNC: 103 MMOL/L (ref 96–106)
CHOLEST SERPL-MCNC: 122 MG/DL (ref 100–199)
CHOLEST SERPL-MCNC: 122 MG/DL (ref 100–199)
CO2 SERPL-SCNC: 20 MMOL/L (ref 20–29)
CO2 SERPL-SCNC: 20 MMOL/L (ref 20–29)
CREAT SERPL-MCNC: 1.07 MG/DL (ref 0.76–1.27)
CREAT SERPL-MCNC: 1.07 MG/DL (ref 0.76–1.27)
EGFRCR SERPLBLD CKD-EPI 2021: 80 ML/MIN/1.73
EGFRCR SERPLBLD CKD-EPI 2021: 80 ML/MIN/1.73
EOSINOPHIL # BLD AUTO: 0.2 X10E3/UL (ref 0–0.4)
EOSINOPHIL # BLD AUTO: 0.2 X10E3/UL (ref 0–0.4)
EOSINOPHIL NFR BLD AUTO: 3 %
EOSINOPHIL NFR BLD AUTO: 3 %
ERYTHROCYTE [DISTWIDTH] IN BLOOD BY AUTOMATED COUNT: 14.1 % (ref 11.6–15.4)
ERYTHROCYTE [DISTWIDTH] IN BLOOD BY AUTOMATED COUNT: 14.1 % (ref 11.6–15.4)
GLOBULIN SER CALC-MCNC: 2.8 G/DL (ref 1.5–4.5)
GLOBULIN SER CALC-MCNC: 2.8 G/DL (ref 1.5–4.5)
GLUCOSE SERPL-MCNC: NORMAL MG/DL
GLUCOSE SERPL-MCNC: NORMAL MG/DL
HBA1C MFR BLD: 5.9 % (ref 4.8–5.6)
HBA1C MFR BLD: 5.9 % (ref 4.8–5.6)
HCT VFR BLD AUTO: 44.4 % (ref 37.5–51)
HCT VFR BLD AUTO: 44.4 % (ref 37.5–51)
HDLC SERPL-MCNC: 37 MG/DL
HDLC SERPL-MCNC: 37 MG/DL
HGB BLD-MCNC: 15 G/DL (ref 13–17.7)
HGB BLD-MCNC: 15 G/DL (ref 13–17.7)
IMM GRANULOCYTES # BLD AUTO: 0 X10E3/UL (ref 0–0.1)
IMM GRANULOCYTES # BLD AUTO: 0 X10E3/UL (ref 0–0.1)
IMM GRANULOCYTES NFR BLD AUTO: 0 %
IMM GRANULOCYTES NFR BLD AUTO: 0 %
IMP & REVIEW OF LAB RESULTS: NORMAL
IMP & REVIEW OF LAB RESULTS: NORMAL
LDLC SERPL CALC-MCNC: 63 MG/DL (ref 0–99)
LDLC SERPL CALC-MCNC: 63 MG/DL (ref 0–99)
LYMPHOCYTES # BLD AUTO: 2 X10E3/UL (ref 0.7–3.1)
LYMPHOCYTES # BLD AUTO: 2 X10E3/UL (ref 0.7–3.1)
LYMPHOCYTES NFR BLD AUTO: 27 %
LYMPHOCYTES NFR BLD AUTO: 27 %
MCH RBC QN AUTO: 29.5 PG (ref 26.6–33)
MCH RBC QN AUTO: 29.5 PG (ref 26.6–33)
MCHC RBC AUTO-ENTMCNC: 33.8 G/DL (ref 31.5–35.7)
MCHC RBC AUTO-ENTMCNC: 33.8 G/DL (ref 31.5–35.7)
MCV RBC AUTO: 87 FL (ref 79–97)
MCV RBC AUTO: 87 FL (ref 79–97)
MONOCYTES # BLD AUTO: 0.7 X10E3/UL (ref 0.1–0.9)
MONOCYTES # BLD AUTO: 0.7 X10E3/UL (ref 0.1–0.9)
MONOCYTES NFR BLD AUTO: 9 %
MONOCYTES NFR BLD AUTO: 9 %
NEUTROPHILS # BLD AUTO: 4.6 X10E3/UL (ref 1.4–7)
NEUTROPHILS # BLD AUTO: 4.6 X10E3/UL (ref 1.4–7)
NEUTROPHILS NFR BLD AUTO: 60 %
NEUTROPHILS NFR BLD AUTO: 60 %
PLATELET # BLD AUTO: 264 X10E3/UL (ref 150–450)
PLATELET # BLD AUTO: 264 X10E3/UL (ref 150–450)
POTASSIUM SERPL-SCNC: NORMAL MMOL/L
POTASSIUM SERPL-SCNC: NORMAL MMOL/L
PROT SERPL-MCNC: 7.5 G/DL (ref 6–8.5)
PROT SERPL-MCNC: 7.5 G/DL (ref 6–8.5)
RBC # BLD AUTO: 5.09 X10E6/UL (ref 4.14–5.8)
RBC # BLD AUTO: 5.09 X10E6/UL (ref 4.14–5.8)
SODIUM SERPL-SCNC: 143 MMOL/L (ref 134–144)
SODIUM SERPL-SCNC: 143 MMOL/L (ref 134–144)
TRIGL SERPL-MCNC: 122 MG/DL (ref 0–149)
TRIGL SERPL-MCNC: 122 MG/DL (ref 0–149)
VLDLC SERPL CALC-MCNC: 22 MG/DL (ref 5–40)
VLDLC SERPL CALC-MCNC: 22 MG/DL (ref 5–40)
WBC # BLD AUTO: 7.5 X10E3/UL (ref 3.4–10.8)
WBC # BLD AUTO: 7.5 X10E3/UL (ref 3.4–10.8)

## 2023-05-02 RX ORDER — DILTIAZEM HYDROCHLORIDE 240 MG/1
CAPSULE, COATED, EXTENDED RELEASE ORAL
Qty: 90 CAPSULE | Refills: 3 | Status: SHIPPED | OUTPATIENT
Start: 2023-05-02

## 2023-05-04 LAB — LABCORP SPECIMEN COLLECTION: NORMAL

## 2023-05-18 ENCOUNTER — HOSPITAL ENCOUNTER (OUTPATIENT)
Facility: HOSPITAL | Age: 58
Discharge: HOME | End: 2023-05-21

## 2023-05-18 LAB — LABCORP SPECIMEN COLLECTION: NORMAL

## 2023-05-22 SDOH — ECONOMIC STABILITY: FOOD INSECURITY: WITHIN THE PAST 12 MONTHS, THE FOOD YOU BOUGHT JUST DIDN'T LAST AND YOU DIDN'T HAVE MONEY TO GET MORE.: NEVER TRUE

## 2023-05-22 SDOH — ECONOMIC STABILITY: FOOD INSECURITY: WITHIN THE PAST 12 MONTHS, YOU WORRIED THAT YOUR FOOD WOULD RUN OUT BEFORE YOU GOT MONEY TO BUY MORE.: NEVER TRUE

## 2023-05-22 SDOH — ECONOMIC STABILITY: TRANSPORTATION INSECURITY
IN THE PAST 12 MONTHS, HAS LACK OF TRANSPORTATION KEPT YOU FROM MEETINGS, WORK, OR FROM GETTING THINGS NEEDED FOR DAILY LIVING?: NO

## 2023-05-22 SDOH — ECONOMIC STABILITY: HOUSING INSECURITY
IN THE LAST 12 MONTHS, WAS THERE A TIME WHEN YOU DID NOT HAVE A STEADY PLACE TO SLEEP OR SLEPT IN A SHELTER (INCLUDING NOW)?: NO

## 2023-05-22 SDOH — ECONOMIC STABILITY: INCOME INSECURITY: HOW HARD IS IT FOR YOU TO PAY FOR THE VERY BASICS LIKE FOOD, HOUSING, MEDICAL CARE, AND HEATING?: NOT HARD AT ALL

## 2023-05-24 NOTE — PROGRESS NOTES
Chief Complaint   Patient presents with    Results     Review of results     Benign Prostatic Hypertrophy    Gastroesophageal Reflux    Hypertension    Other     Hx of Pre-DM    Sleep Apnea     ALEX with CPAP use       1. \"Have you been to the ER, urgent care clinic since your last visit? Hospitalized since your last visit? \" No    2. \"Have you seen or consulted any other health care providers outside of the [address] [place] [address] since your last visit? \" No     3. For patients aged 39-70: Has the patient had a colonoscopy / FIT/ Cologuard? Yes - no Care Gap present due 12/012026      If the patient is female:    4. For patients aged 41-77: Has the patient had a mammogram within the past 2 years? NA - based on age or sex      11. For patients aged 21-65: Has the patient had a pap smear?  NA - based on age or sex

## 2023-05-25 ENCOUNTER — OFFICE VISIT (OUTPATIENT)
Age: 58
End: 2023-05-25
Payer: COMMERCIAL

## 2023-05-25 VITALS
HEIGHT: 69 IN | WEIGHT: 217.13 LBS | BODY MASS INDEX: 32.16 KG/M2 | HEART RATE: 70 BPM | OXYGEN SATURATION: 98 % | DIASTOLIC BLOOD PRESSURE: 80 MMHG | TEMPERATURE: 98 F | RESPIRATION RATE: 16 BRPM | SYSTOLIC BLOOD PRESSURE: 118 MMHG

## 2023-05-25 DIAGNOSIS — R73.03 PREDIABETES: ICD-10-CM

## 2023-05-25 DIAGNOSIS — E78.5 HYPERLIPIDEMIA, UNSPECIFIED HYPERLIPIDEMIA TYPE: ICD-10-CM

## 2023-05-25 DIAGNOSIS — I10 ESSENTIAL (PRIMARY) HYPERTENSION: Primary | ICD-10-CM

## 2023-05-25 DIAGNOSIS — G47.33 OBSTRUCTIVE SLEEP APNEA (ADULT) (PEDIATRIC): ICD-10-CM

## 2023-05-25 DIAGNOSIS — Z99.89 DEPENDENCE ON OTHER ENABLING MACHINES AND DEVICES: ICD-10-CM

## 2023-05-25 DIAGNOSIS — N40.0 BENIGN PROSTATIC HYPERPLASIA WITHOUT LOWER URINARY TRACT SYMPTOMS: ICD-10-CM

## 2023-05-25 DIAGNOSIS — E66.09 OBESITY DUE TO EXCESS CALORIES WITH SERIOUS COMORBIDITY, UNSPECIFIED CLASSIFICATION: ICD-10-CM

## 2023-05-25 DIAGNOSIS — K21.9 GASTRO-ESOPHAGEAL REFLUX DISEASE WITHOUT ESOPHAGITIS: ICD-10-CM

## 2023-05-25 DIAGNOSIS — G43.809 OTHER MIGRAINE WITHOUT STATUS MIGRAINOSUS, NOT INTRACTABLE: ICD-10-CM

## 2023-05-25 DIAGNOSIS — N20.0 CALCULUS OF KIDNEY: ICD-10-CM

## 2023-05-25 DIAGNOSIS — F32.4 MAJOR DEPRESSIVE DISORDER, SINGLE EPISODE, IN PARTIAL REMISSION (HCC): ICD-10-CM

## 2023-05-25 PROCEDURE — 99214 OFFICE O/P EST MOD 30 MIN: CPT | Performed by: FAMILY MEDICINE

## 2023-05-25 PROCEDURE — 3079F DIAST BP 80-89 MM HG: CPT | Performed by: FAMILY MEDICINE

## 2023-05-25 PROCEDURE — 3074F SYST BP LT 130 MM HG: CPT | Performed by: FAMILY MEDICINE

## 2023-05-25 RX ORDER — SERTRALINE HYDROCHLORIDE 100 MG/1
100 TABLET, FILM COATED ORAL DAILY
Qty: 90 TABLET | Refills: 3 | Status: SHIPPED | OUTPATIENT
Start: 2023-05-25

## 2023-05-25 RX ORDER — LIRAGLUTIDE 6 MG/ML
3 INJECTION, SOLUTION SUBCUTANEOUS DAILY
Qty: 12 ADJUSTABLE DOSE PRE-FILLED PEN SYRINGE | Refills: 1 | Status: SHIPPED | OUTPATIENT
Start: 2023-05-25

## 2023-05-25 ASSESSMENT — PATIENT HEALTH QUESTIONNAIRE - PHQ9
10. IF YOU CHECKED OFF ANY PROBLEMS, HOW DIFFICULT HAVE THESE PROBLEMS MADE IT FOR YOU TO DO YOUR WORK, TAKE CARE OF THINGS AT HOME, OR GET ALONG WITH OTHER PEOPLE: 0
SUM OF ALL RESPONSES TO PHQ QUESTIONS 1-9: 0
4. FEELING TIRED OR HAVING LITTLE ENERGY: 0
3. TROUBLE FALLING OR STAYING ASLEEP: 0
SUM OF ALL RESPONSES TO PHQ9 QUESTIONS 1 & 2: 0
2. FEELING DOWN, DEPRESSED OR HOPELESS: 0
6. FEELING BAD ABOUT YOURSELF - OR THAT YOU ARE A FAILURE OR HAVE LET YOURSELF OR YOUR FAMILY DOWN: 0
SUM OF ALL RESPONSES TO PHQ QUESTIONS 1-9: 0
8. MOVING OR SPEAKING SO SLOWLY THAT OTHER PEOPLE COULD HAVE NOTICED. OR THE OPPOSITE, BEING SO FIGETY OR RESTLESS THAT YOU HAVE BEEN MOVING AROUND A LOT MORE THAN USUAL: 0
1. LITTLE INTEREST OR PLEASURE IN DOING THINGS: 0
9. THOUGHTS THAT YOU WOULD BE BETTER OFF DEAD, OR OF HURTING YOURSELF: 0
5. POOR APPETITE OR OVEREATING: 0
7. TROUBLE CONCENTRATING ON THINGS, SUCH AS READING THE NEWSPAPER OR WATCHING TELEVISION: 0

## 2023-05-25 NOTE — PATIENT INSTRUCTIONS
day for 10 or 20 minutes. Moderate exercise is safe for most people, but it is always a good idea to talk to your doctor before starting an exercise program.  Keep moving. [place] the lawn, work in the garden, or Cynny. Take the stairs instead of the elevator at work. If you smoke, quit. People who smoke have an increased risk for heart attack, stroke, cancer, and other lung illnesses. Quitting is hard, but there are ways to boost your chance of quitting tobacco for good. Use nicotine gum, patches, or lozenges. Ask your doctor about stop-smoking programs and medicines. Keep trying. In addition to reducing your risk of diseases in the future, you will notice some benefits soon after you stop using tobacco. If you have shortness of breath or asthma symptoms, they will likely get better within a few weeks after you quit. Limit how much alcohol you drink. Moderate amounts of alcohol (up to 2 drinks a day for men, 1 drink a day for women) are okay. But drinking too much can lead to liver problems, high blood pressure, and other health problems. Family health  If you have a family, there are many things you can do together to improve your health. Eat meals together as a family as often as possible. Eat healthy foods. This includes fruits, vegetables, lean meats and dairy, and whole grains. Include your family in your fitness plan. Most people think of activities such as jogging or tennis as the way to fitness, but there are many ways you and your family can be more active. Anything that makes you breathe hard and gets your heart pumping is exercise. Here are some tips:  Walk to do errands or to take your child to school or the bus. Go for a family bike ride after dinner instead of watching TV. Where can you learn more? Go to http://paige-dusty.info/. Enter T545 in the search box to learn more about \"A Healthy Lifestyle: Care Instructions. \"  Current as of: September 11,

## 2023-05-25 NOTE — PROGRESS NOTES
SUBJECTIVE  Chief Complaint   Patient presents with    Results     Review of results     Benign Prostatic Hypertrophy    Gastroesophageal Reflux    Hypertension    Other     Hx of Pre-DM    Sleep Apnea     ALEX with CPAP use       He has metabolic disease in the way of hypertension, prediabetes, hyperlipidemia in the setting of obesity. He reports compliance with his current medications without side effects. He has been on Saxenda without any adverse side effects. He has lost weight as a result of not being able to eat as much on the medication. He would like to stay on it. He has depression that is well-controlled on Zoloft. He has no side effects. No harmful ideations outwardly expressed. He has GERD controlled on PPIs which he seldom has to take. That may have improved with the weight loss as well. He has had colorectal cancer screening updated at the end of 2021. He has sleep apnea and chronic daily headaches controlled on Pamelor and with the use of CPAP for ALEX. He has BPH and a family history of prostate CA. He has had kidney stones as well and sees a urologist.     OBJECTIVE    Blood pressure 118/80, pulse 70, temperature 98 °F (36.7 °C), temperature source Temporal, resp. rate 16, height 5' 9\" (1.753 m), weight 217 lb 2 oz (98.5 kg), SpO2 98 %. General:  Alert, cooperative, well appearing, in no apparent distress. CV:  The heart sounds are regular in rate and rhythm. There is a normal S1 and S2. There or no murmurs  Lungs: Inspiratory and expiratory efforts are full and unlabored. Lung sounds are clear and equal to auscultation throughout all lung fields without wheezing, rales, or rhonchi. Skin:  No rashes, no jaundice. Psych: normal affect. Mood good. Oriented x 3. Judgement and insight intact.       Latest Reference Range & Units 04/25/23 00:00   Sodium 134 - 144 mmol/L  134 - 144 mmol/L 143  143   Potassium mmol/L  mmol/L CANCELED  CANCELED   Chloride 96 - 106

## 2023-06-07 ENCOUNTER — TELEPHONE (OUTPATIENT)
Age: 58
End: 2023-06-07

## 2023-06-21 NOTE — TELEPHONE ENCOUNTER
Request Reference Number: [id number]. SAXENDA INJ 18MG/3ML is approved through 12/14/2023. Your patient may now fill this prescription and it will be covered.

## 2023-06-26 RX ORDER — OMEPRAZOLE 40 MG/1
40 CAPSULE, DELAYED RELEASE ORAL DAILY
Qty: 90 CAPSULE | Refills: 1 | Status: SHIPPED | OUTPATIENT
Start: 2023-06-26

## 2023-06-28 NOTE — TELEPHONE ENCOUNTER
Have you been diagnosed with, tested for, or told that you are suspected of having COVID-19 (coronovirus)? Negative August   Have you had a fever or taken medication to treat a fever in the past 72 hours? No  Have you had a cough, SOB, or flu-like symptoms within the past 3 days? No  Have you had direct contact with someone who tested positive for COVID-19 within the past 14 days? No  Do you have a household member with flu-like symptoms, including fever, cough, or SOB? No  Do you currently have flu-like symptoms including fever, cough, or SOB? No   Are you experiencing new loss of taste or smell?  No
left eye pain/eyelid swelling

## 2023-07-18 RX ORDER — ROSUVASTATIN CALCIUM 10 MG/1
TABLET, COATED ORAL
Qty: 90 TABLET | Refills: 3 | Status: SHIPPED | OUTPATIENT
Start: 2023-07-18

## 2023-07-18 NOTE — TELEPHONE ENCOUNTER
Last OV 05/25/2023  Next OV 11/27/2023  Last lab 04/25/2023 lipid,cmp  Last filled 07/05/2022  90 tabs  3 RF

## 2023-08-18 NOTE — TELEPHONE ENCOUNTER
Requested Prescriptions     Pending Prescriptions Disp Refills    SAXENDA 18 MG/3ML SOPN [Pharmacy Med Name: SAXENDA  6MG  INJ  ML] 45 mL 3     Sig: INJECT SUBCUTANEOUSLY 3 MG DAILY     Last OV: 5/225/2023  Last labs:4/25/2023  Next OV: 11/27/2023

## 2023-08-22 RX ORDER — LIRAGLUTIDE 6 MG/ML
INJECTION, SOLUTION SUBCUTANEOUS
Qty: 45 ML | Refills: 0 | Status: SHIPPED | OUTPATIENT
Start: 2023-08-22

## 2023-10-23 RX ORDER — ISOSORBIDE MONONITRATE 60 MG/1
60 TABLET, EXTENDED RELEASE ORAL EVERY MORNING
Qty: 90 TABLET | Refills: 3 | OUTPATIENT
Start: 2023-10-23

## 2023-10-25 RX ORDER — ERGOCALCIFEROL 1.25 MG/1
CAPSULE ORAL
Qty: 13 CAPSULE | Refills: 1 | Status: SHIPPED | OUTPATIENT
Start: 2023-10-25

## 2023-11-02 RX ORDER — NORTRIPTYLINE HYDROCHLORIDE 50 MG/1
CAPSULE ORAL
Qty: 90 CAPSULE | Refills: 3 | Status: SHIPPED | OUTPATIENT
Start: 2023-11-02

## 2023-11-10 RX ORDER — LIRAGLUTIDE 6 MG/ML
3 INJECTION, SOLUTION SUBCUTANEOUS DAILY
Qty: 90 ADJUSTABLE DOSE PRE-FILLED PEN SYRINGE | Refills: 0 | Status: SHIPPED | OUTPATIENT
Start: 2023-11-10

## 2023-11-15 RX ORDER — ISOSORBIDE MONONITRATE 60 MG/1
60 TABLET, EXTENDED RELEASE ORAL EVERY MORNING
Qty: 90 TABLET | Refills: 3 | OUTPATIENT
Start: 2023-11-15

## 2023-11-17 ENCOUNTER — TELEPHONE (OUTPATIENT)
Age: 58
End: 2023-11-17

## 2023-11-17 NOTE — TELEPHONE ENCOUNTER
Fax received from [address] my meds stating prior Ayala Montoya is required for the Saxenda 18 mg/3 ml pen-injectors. Submit a PA request  1. Go to key. [employer] and click \"Enter a Key\"  2. Patient last name: Cathy Michelle      : 1965      Key: [id number]  3.  Click \"start a PA\", complete the form, and \"send to plan\"

## 2023-11-24 NOTE — TELEPHONE ENCOUNTER
Prior authorization is pending and waiting for Determination. Please wait for [employer] 2017 NCP[employer] to return a determination.

## 2023-12-26 RX ORDER — LIRAGLUTIDE 6 MG/ML
INJECTION, SOLUTION SUBCUTANEOUS
Qty: 45 ML | Refills: 3 | OUTPATIENT
Start: 2023-12-26

## 2023-12-26 RX ORDER — OMEPRAZOLE 40 MG/1
40 CAPSULE, DELAYED RELEASE ORAL DAILY
Qty: 90 CAPSULE | Refills: 3 | OUTPATIENT
Start: 2023-12-26

## 2023-12-26 RX ORDER — ISOSORBIDE MONONITRATE 60 MG/1
60 TABLET, EXTENDED RELEASE ORAL EVERY MORNING
Qty: 90 TABLET | Refills: 3 | OUTPATIENT
Start: 2023-12-26

## 2023-12-26 NOTE — PROGRESS NOTES
1. \"Have you been to the ER, urgent care clinic since your last visit? Hospitalized since your last visit? \" Yes 10/26/2023 [facility] for acute viral syndrome    2. \"Have you seen or consulted any other health care providers outside of the [address] [place] [address] since your last visit? \" No     3. For patients aged 43-73: Has the patient had a colonoscopy / FIT/ Cologuard?  Yes - no Care Gap present due 12/01/2026

## 2023-12-26 NOTE — TELEPHONE ENCOUNTER
Spoke with Mr. Devin Thornton whom has been scheduled for December 27,2023 @ 3:00 pm
Was due in Nov.  Please sched. We have openings this week if needed.
Epithelial Cells, UA 0-10 0 - 10 /hpf    Casts UA None seen None seen /lpf    Crystals, UA Present (A) N/A    Crystal Type Calcium Oxalate N/A    Bacteria, UA None seen None seen/Few   AMB POC PVR, HIMANSHU,POST-VOID RES,US,NON-IMAGING   Result Value Ref Range    PVR, POC 5 cc   AMB POC URINALYSIS DIP STICK AUTO W/O MICRO   Result Value Ref Range    Color, Urine, POC      Clarity, Urine, POC      Glucose, Urine, POC Negative Negative    Bilirubin, Urine, POC Negative Negative    Ketones, Urine, POC 1+ Negative    Specific Gravity, Urine, POC 1.020 1.001 - 1.035    Blood, Urine, POC Trace-intact Negative    pH, Urine, POC 6.0 4.6 - 8.0    Protein, Urine, POC 1+ Negative    Urobilinogen, POC 0.2 mg/dL     Nitrite, Urine, POC Negative Negative    Leukocyte Esterase, Urine, POC Trace Negative         Thank you.

## 2023-12-27 ENCOUNTER — OFFICE VISIT (OUTPATIENT)
Age: 58
End: 2023-12-27
Payer: COMMERCIAL

## 2023-12-27 VITALS
SYSTOLIC BLOOD PRESSURE: 102 MMHG | BODY MASS INDEX: 32.38 KG/M2 | WEIGHT: 218.6 LBS | OXYGEN SATURATION: 98 % | DIASTOLIC BLOOD PRESSURE: 72 MMHG | HEIGHT: 69 IN | TEMPERATURE: 98.1 F | HEART RATE: 80 BPM | RESPIRATION RATE: 18 BRPM

## 2023-12-27 DIAGNOSIS — G47.33 OBSTRUCTIVE SLEEP APNEA (ADULT) (PEDIATRIC): ICD-10-CM

## 2023-12-27 DIAGNOSIS — I10 ESSENTIAL (PRIMARY) HYPERTENSION: Primary | ICD-10-CM

## 2023-12-27 DIAGNOSIS — F32.4 MAJOR DEPRESSIVE DISORDER, SINGLE EPISODE, IN PARTIAL REMISSION (HCC): ICD-10-CM

## 2023-12-27 DIAGNOSIS — G43.809 OTHER MIGRAINE WITHOUT STATUS MIGRAINOSUS, NOT INTRACTABLE: ICD-10-CM

## 2023-12-27 DIAGNOSIS — E78.5 HYPERLIPIDEMIA, UNSPECIFIED HYPERLIPIDEMIA TYPE: ICD-10-CM

## 2023-12-27 DIAGNOSIS — Z99.89 DEPENDENCE ON OTHER ENABLING MACHINES AND DEVICES: ICD-10-CM

## 2023-12-27 DIAGNOSIS — N20.0 CALCULUS OF KIDNEY: ICD-10-CM

## 2023-12-27 DIAGNOSIS — R73.03 PREDIABETES: ICD-10-CM

## 2023-12-27 DIAGNOSIS — E66.09 OBESITY DUE TO EXCESS CALORIES WITH SERIOUS COMORBIDITY, UNSPECIFIED CLASSIFICATION: ICD-10-CM

## 2023-12-27 DIAGNOSIS — N40.0 BENIGN PROSTATIC HYPERPLASIA WITHOUT LOWER URINARY TRACT SYMPTOMS: ICD-10-CM

## 2023-12-27 DIAGNOSIS — K21.9 GASTRO-ESOPHAGEAL REFLUX DISEASE WITHOUT ESOPHAGITIS: ICD-10-CM

## 2023-12-27 LAB — HBA1C MFR BLD: 5.5 %

## 2023-12-27 PROCEDURE — 83036 HEMOGLOBIN GLYCOSYLATED A1C: CPT | Performed by: FAMILY MEDICINE

## 2023-12-27 PROCEDURE — 99214 OFFICE O/P EST MOD 30 MIN: CPT | Performed by: FAMILY MEDICINE

## 2023-12-27 PROCEDURE — 3074F SYST BP LT 130 MM HG: CPT | Performed by: FAMILY MEDICINE

## 2023-12-27 PROCEDURE — 3078F DIAST BP <80 MM HG: CPT | Performed by: FAMILY MEDICINE

## 2023-12-27 RX ORDER — OMEPRAZOLE 40 MG/1
40 CAPSULE, DELAYED RELEASE ORAL DAILY
Qty: 90 CAPSULE | Refills: 1 | Status: SHIPPED | OUTPATIENT
Start: 2023-12-27

## 2023-12-28 RX ORDER — ISOSORBIDE MONONITRATE 60 MG/1
60 TABLET, EXTENDED RELEASE ORAL DAILY
Qty: 90 TABLET | Refills: 0 | Status: SHIPPED | OUTPATIENT
Start: 2023-12-28

## 2024-01-09 DIAGNOSIS — I10 ESSENTIAL (PRIMARY) HYPERTENSION: ICD-10-CM

## 2024-01-09 RX ORDER — ISOSORBIDE MONONITRATE 60 MG/1
60 TABLET, EXTENDED RELEASE ORAL DAILY
Qty: 90 TABLET | Refills: 0 | Status: SHIPPED | OUTPATIENT
Start: 2024-01-09

## 2024-02-05 ENCOUNTER — OFFICE VISIT (OUTPATIENT)
Age: 59
End: 2024-02-05
Payer: COMMERCIAL

## 2024-02-05 VITALS
SYSTOLIC BLOOD PRESSURE: 133 MMHG | HEART RATE: 79 BPM | DIASTOLIC BLOOD PRESSURE: 75 MMHG | OXYGEN SATURATION: 98 % | WEIGHT: 222 LBS | HEIGHT: 69 IN | BODY MASS INDEX: 32.88 KG/M2

## 2024-02-05 DIAGNOSIS — R55 VASOVAGAL SYNCOPE: ICD-10-CM

## 2024-02-05 DIAGNOSIS — G47.33 OBSTRUCTIVE SLEEP APNEA (ADULT) (PEDIATRIC): ICD-10-CM

## 2024-02-05 DIAGNOSIS — I10 ESSENTIAL (PRIMARY) HYPERTENSION: Primary | ICD-10-CM

## 2024-02-05 DIAGNOSIS — I25.10 ATHEROSCLEROSIS OF NATIVE CORONARY ARTERY OF NATIVE HEART WITHOUT ANGINA PECTORIS: ICD-10-CM

## 2024-02-05 DIAGNOSIS — E78.2 MIXED HYPERLIPIDEMIA: ICD-10-CM

## 2024-02-05 PROCEDURE — 93000 ELECTROCARDIOGRAM COMPLETE: CPT | Performed by: NURSE PRACTITIONER

## 2024-02-05 PROCEDURE — 99214 OFFICE O/P EST MOD 30 MIN: CPT | Performed by: NURSE PRACTITIONER

## 2024-02-05 PROCEDURE — 3078F DIAST BP <80 MM HG: CPT | Performed by: NURSE PRACTITIONER

## 2024-02-05 PROCEDURE — 3075F SYST BP GE 130 - 139MM HG: CPT | Performed by: NURSE PRACTITIONER

## 2024-02-05 RX ORDER — NITROGLYCERIN 400 UG/1
SPRAY ORAL
Qty: 1 EACH | Refills: 0 | Status: SHIPPED | OUTPATIENT
Start: 2024-02-05

## 2024-02-05 NOTE — PROGRESS NOTES
1. Have you been to the ER, urgent care clinic since your last visit?  Hospitalized since your last visit?     no    2. Have you seen or consulted any other health care providers outside of the [facility] since your last visit?  Include any pap smears or colon screening.      Yes pcp   
CD) 240 MG extended release capsule TAKE 1 CAPSULE BY MOUTH  DAILY 5/2/23  Yes Breonna Marinelli APRN - NP   tamsulosin (FLOMAX) 0.4 MG capsule Take 2 capsules by mouth daily 4/25/23  Yes Brittaney Gutierrez PA   Ascorbic Acid (VITAMIN C PO) Take by mouth   Yes Provider, MD Nicole   CPAP Machine MISC by Other route 11/22/16  Yes ProviderNicole MD   Multiple Vitamin (MULTIVITAMIN PO) Take 1 tablet by mouth daily   Yes Provider, MD Nicole   aspirin 81 MG EC tablet Take 1 tablet by mouth daily   Yes Automatic Reconciliation, Ar   latanoprost (XALATAN) 0.005 % ophthalmic solution Apply 1 drop to eye daily 10/24/19  Yes Automatic Reconciliation, Ar         /75 (Site: Right Upper Arm, Position: Sitting, Cuff Size: Medium Adult)   Pulse 79   Ht 1.753 m (5' 9\")   Wt 100.7 kg (222 lb)   SpO2 98%   BMI 32.78 kg/m²     Physical Exam  Vitals and nursing note reviewed.   Constitutional:       Appearance: He is well-developed.   HENT:      Head: Normocephalic and atraumatic.   Eyes:      Conjunctiva/sclera: Conjunctivae normal.   Neck:      Thyroid: No thyromegaly.      Vascular: No JVD.      Trachea: No tracheal deviation.   Cardiovascular:      Rate and Rhythm: Normal rate and regular rhythm.      Heart sounds: Normal heart sounds. No murmur heard.    No friction rub. No gallop.   Pulmonary:      Effort: No respiratory distress.      Breath sounds: Normal breath sounds. No wheezing or rales.   Chest:      Chest wall: No tenderness.   Abdominal:      Palpations: Abdomen is soft.      Tenderness: There is no abdominal tenderness.   Musculoskeletal:      Cervical back: Neck supple.      Right lower leg: No edema.      Left lower leg: No edema.   Skin:     General: Skin is warm and dry.   Neurological:      Mental Status: He is alert and oriented to person, place, and time.       Mr. Olguin has a reminder for a \"due or due soon\" health maintenance. I have asked that he contact his primary care provider for

## 2024-02-14 RX ORDER — LIRAGLUTIDE 6 MG/ML
INJECTION, SOLUTION SUBCUTANEOUS
Qty: 45 ML | Refills: 3 | Status: SHIPPED | OUTPATIENT
Start: 2024-02-14

## 2024-02-14 RX ORDER — SERTRALINE HYDROCHLORIDE 100 MG/1
100 TABLET, FILM COATED ORAL DAILY
Qty: 90 TABLET | Refills: 1 | Status: SHIPPED | OUTPATIENT
Start: 2024-02-14

## 2024-02-21 RX ORDER — ROSUVASTATIN CALCIUM 10 MG/1
TABLET, COATED ORAL
Qty: 90 TABLET | Refills: 1 | Status: SHIPPED | OUTPATIENT
Start: 2024-02-21

## 2024-02-22 RX ORDER — NITROGLYCERIN 400 UG/1
SPRAY ORAL
Qty: 4.9 G | Refills: 11 | Status: SHIPPED | OUTPATIENT
Start: 2024-02-22

## 2024-03-04 ENCOUNTER — TELEPHONE (OUTPATIENT)
Age: 59
End: 2024-03-04

## 2024-03-04 DIAGNOSIS — I25.10 ATHEROSCLEROSIS OF NATIVE CORONARY ARTERY OF NATIVE HEART WITHOUT ANGINA PECTORIS: Primary | ICD-10-CM

## 2024-03-12 ENCOUNTER — HOSPITAL ENCOUNTER (OUTPATIENT)
Facility: HOSPITAL | Age: 59
Discharge: HOME | End: 2024-03-14
Payer: COMMERCIAL

## 2024-03-12 ENCOUNTER — HOSPITAL ENCOUNTER (OUTPATIENT)
Facility: HOSPITAL | Age: 59
Discharge: HOME | End: 2024-03-15
Payer: COMMERCIAL

## 2024-03-12 VITALS
DIASTOLIC BLOOD PRESSURE: 85 MMHG | HEART RATE: 69 BPM | SYSTOLIC BLOOD PRESSURE: 141 MMHG | WEIGHT: 195 LBS | BODY MASS INDEX: 32.49 KG/M2 | HEIGHT: 65 IN

## 2024-03-12 DIAGNOSIS — I25.10 ATHEROSCLEROSIS OF NATIVE CORONARY ARTERY OF NATIVE HEART WITHOUT ANGINA PECTORIS: ICD-10-CM

## 2024-03-12 LAB
ECHO BSA: 2.01 M2
NUC STRESS EJECTION FRACTION: 42 %
STRESS BASELINE DIAS BP: 89 MMHG
STRESS BASELINE HR: 71 BPM
STRESS BASELINE SYS BP: 151 MMHG
STRESS ESTIMATED WORKLOAD: 1 METS
STRESS PEAK DIAS BP: 89 MMHG
STRESS PEAK SYS BP: 151 MMHG
STRESS PERCENT HR ACHIEVED: 63 %
STRESS POST PEAK HR: 100 BPM
STRESS RATE PRESSURE PRODUCT: NORMAL BPM*MMHG
STRESS TARGET HR: 160 BPM
TID: 0.9

## 2024-03-12 PROCEDURE — 3430000000 HC RX DIAGNOSTIC RADIOPHARMACEUTICAL: Performed by: NURSE PRACTITIONER

## 2024-03-12 PROCEDURE — 6360000002 HC RX W HCPCS: Performed by: NURSE PRACTITIONER

## 2024-03-12 PROCEDURE — A9502 TC99M TETROFOSMIN: HCPCS | Performed by: NURSE PRACTITIONER

## 2024-03-12 PROCEDURE — 2580000003 HC RX 258: Performed by: NURSE PRACTITIONER

## 2024-03-12 PROCEDURE — 93017 CV STRESS TEST TRACING ONLY: CPT

## 2024-03-12 RX ORDER — REGADENOSON 0.08 MG/ML
0.4 INJECTION, SOLUTION INTRAVENOUS
Status: COMPLETED | OUTPATIENT
Start: 2024-03-12 | End: 2024-03-12

## 2024-03-12 RX ORDER — 0.9 % SODIUM CHLORIDE 0.9 %
250 INTRAVENOUS SOLUTION INTRAVENOUS ONCE
Status: COMPLETED | OUTPATIENT
Start: 2024-03-12 | End: 2024-03-12

## 2024-03-12 RX ADMIN — TETROFOSMIN 11 MILLICURIE: 1.38 INJECTION, POWDER, LYOPHILIZED, FOR SOLUTION INTRAVENOUS at 07:20

## 2024-03-12 RX ADMIN — SODIUM CHLORIDE 250 ML: 9 INJECTION, SOLUTION INTRAVENOUS at 08:46

## 2024-03-12 RX ADMIN — TETROFOSMIN 33 MILLICURIE: 1.38 INJECTION, POWDER, LYOPHILIZED, FOR SOLUTION INTRAVENOUS at 08:46

## 2024-03-12 RX ADMIN — REGADENOSON 0.4 MG: 0.08 INJECTION, SOLUTION INTRAVENOUS at 08:46

## 2024-03-15 RX ORDER — ERGOCALCIFEROL 1.25 MG/1
CAPSULE ORAL
Qty: 13 CAPSULE | Refills: 1 | Status: SHIPPED | OUTPATIENT
Start: 2024-03-15

## 2024-03-17 NOTE — H&P (VIEW-ONLY)
Interpretation Summary 4/2022         Left Ventricle: Normal left ventricular systolic function with a visually estimated EF of 55 - 60%. Left ventricle size is normal. Mildly increased wall thickness. Findings consistent with mild concentric hypertrophy. Normal wall motion. Diastolic dysfunction present with normal LV EF.    Mitral Valve: Mildly thickened leaflet. Mild annular calcification of the mitral valve. Mild regurgitation.    Left Atrium: Left atrium is mildly dilated. LA Vol Index A/L is 38 mL/m2.     Conclusion cardiac cath 5/2022       No evidence of any significant critical disease in the coronary arteries other than diffuse luminal irregularities.     Medical treatment and risk factor modification to continue.    Nuclear stress test   3/12/2024   Interpretation Summary       Stress Test: A pharmacological stress test was performed using lexiscan.    ECG: Resting ECG demonstrates normal sinus rhythm.    Stress Function: Post-stress ejection fraction is 42%, moderately reduced, global hypokinesis.    Perfusion Conclusion: TID ratio is 0.90.    Perfusion Comments: LV perfusion is abnormal.    Perfusion Defect: There is a moderate severity left ventricular stress perfusion defect that is small to medium in size present in the mid to distal lateral and apex segment(s). The defect appears to be probable ischemia.    Stress Combined Conclusion: Findings suggest a moderate risk of cardiac events.  Assessment  Mr. Olguin has a reminder for a \"due or due soon\" health maintenance. I have asked that he contact his primary care provider for follow-up on this health maintenance.         No data to display                  Assessment        Diagnosis Orders   1. Atherosclerosis of native coronary artery of native heart without angina pectoris  Echo (TTE) complete (PRN contrast/bubble/strain/3D)      2. Essential (primary) hypertension        3. Mixed hyperlipidemia        4. Obstructive sleep apnea (adult)  (pediatric)              There are no discontinued medications.      Had tried cardizem in past -had drop in bp  No acei/normal lvef  2/2019  Angina significantly improved on addition of Imdur.  Blood pressure controlled continue medical management  In 2019  Cardiac status stable.  Angina stable.  He has orthostatic dizziness.  Likely from neuropathy.  Will use support stocking.  Consider additional medication if required if symptoms are persistent  2/2020  Recent syncope clinically vasovagal.  History of positive tilt table in 2013.  Will discontinue amlodipine and monitor as blood pressure on low normal side.  Monitor for angina and spasm.  Continue with support stocking.  Pressure maneuvers and if needed midodrine  7/2020  Cardiac status stable.  Okay to proceed with knee surgery with medium cardiac risk.  No recurrence of syncope or angina  1/2021  Recent increase in chest tightness.  Currently takes Imdur 60 mg increased amlodipine to 10 mg a day.  Monitor blood pressure.  Ordered lab including TSH LFT and lipids and BMP  5/2021  Episodes of palpitation and tachycardia noted.  We will change amlodipine to diltiazem.  Continue other treatment and monitor.  Blood pressure starts elevating might need a combination of diltiazem and amlodipine  6/2021  Cardiac status stable.  Blood pressure much better controlled.  Heart rate better.  Palpitations are better continue treatment  11/2021  Stable cardiac status.  Intermittent use of nitroglycerin.  Continue current medical management  4/2022  Patient seen with c/o increased episodes of chest pain with dyspnea with worsening exercise tolerance.  He is using SL nitroglycerin more often. Will evaluate with stress test and echo. B/P controlled.  EKG SR with T wave abnormalities.  Continue current medications.  4/22/2022  Continues to have progression anginal pain requiring multiple nitroglycerin.  Class III.  Even though stress test is negative, concerned about progressive

## 2024-03-18 DIAGNOSIS — I10 ESSENTIAL (PRIMARY) HYPERTENSION: Primary | ICD-10-CM

## 2024-03-18 RX ORDER — DILTIAZEM HYDROCHLORIDE 240 MG/1
240 CAPSULE, COATED, EXTENDED RELEASE ORAL DAILY
Qty: 30 CAPSULE | Refills: 3 | Status: SHIPPED | OUTPATIENT
Start: 2024-03-18

## 2024-03-18 NOTE — TELEPHONE ENCOUNTER
Requested Prescriptions     Pending Prescriptions Disp Refills    dilTIAZem (CARDIZEM CD) 240 MG extended release capsule 30 capsule 3     Sig: Take 1 capsule by mouth daily

## 2024-03-22 ENCOUNTER — OFFICE VISIT (OUTPATIENT)
Age: 59
End: 2024-03-22
Payer: COMMERCIAL

## 2024-03-22 VITALS
HEIGHT: 65 IN | OXYGEN SATURATION: 98 % | DIASTOLIC BLOOD PRESSURE: 80 MMHG | HEART RATE: 61 BPM | WEIGHT: 224 LBS | BODY MASS INDEX: 37.32 KG/M2 | SYSTOLIC BLOOD PRESSURE: 136 MMHG

## 2024-03-22 DIAGNOSIS — G47.33 OBSTRUCTIVE SLEEP APNEA (ADULT) (PEDIATRIC): ICD-10-CM

## 2024-03-22 DIAGNOSIS — E78.2 MIXED HYPERLIPIDEMIA: ICD-10-CM

## 2024-03-22 DIAGNOSIS — I25.10 ATHEROSCLEROSIS OF NATIVE CORONARY ARTERY OF NATIVE HEART WITHOUT ANGINA PECTORIS: Primary | ICD-10-CM

## 2024-03-22 DIAGNOSIS — I10 ESSENTIAL (PRIMARY) HYPERTENSION: ICD-10-CM

## 2024-03-22 PROCEDURE — 3079F DIAST BP 80-89 MM HG: CPT | Performed by: NURSE PRACTITIONER

## 2024-03-22 PROCEDURE — 99214 OFFICE O/P EST MOD 30 MIN: CPT | Performed by: NURSE PRACTITIONER

## 2024-03-22 PROCEDURE — 3075F SYST BP GE 130 - 139MM HG: CPT | Performed by: NURSE PRACTITIONER

## 2024-03-22 ASSESSMENT — PATIENT HEALTH QUESTIONNAIRE - PHQ9
SUM OF ALL RESPONSES TO PHQ QUESTIONS 1-9: 0
2. FEELING DOWN, DEPRESSED OR HOPELESS: NOT AT ALL
SUM OF ALL RESPONSES TO PHQ QUESTIONS 1-9: 0
SUM OF ALL RESPONSES TO PHQ QUESTIONS 1-9: 0
SUM OF ALL RESPONSES TO PHQ9 QUESTIONS 1 & 2: 0
1. LITTLE INTEREST OR PLEASURE IN DOING THINGS: NOT AT ALL
SUM OF ALL RESPONSES TO PHQ QUESTIONS 1-9: 0

## 2024-03-22 NOTE — PROGRESS NOTES
1. Have you been to the ER, urgent care clinic since your last visit?  Hospitalized since your last visit?     no    2. Have you seen or consulted any other health care providers outside of the [facility] since your last visit?  Include any pap smears or colon screening.      no   
(pediatric)              There are no discontinued medications.      Had tried cardizem in past -had drop in bp  No acei/normal lvef  2/2019  Angina significantly improved on addition of Imdur.  Blood pressure controlled continue medical management  In 2019  Cardiac status stable.  Angina stable.  He has orthostatic dizziness.  Likely from neuropathy.  Will use support stocking.  Consider additional medication if required if symptoms are persistent  2/2020  Recent syncope clinically vasovagal.  History of positive tilt table in 2013.  Will discontinue amlodipine and monitor as blood pressure on low normal side.  Monitor for angina and spasm.  Continue with support stocking.  Pressure maneuvers and if needed midodrine  7/2020  Cardiac status stable.  Okay to proceed with knee surgery with medium cardiac risk.  No recurrence of syncope or angina  1/2021  Recent increase in chest tightness.  Currently takes Imdur 60 mg increased amlodipine to 10 mg a day.  Monitor blood pressure.  Ordered lab including TSH LFT and lipids and BMP  5/2021  Episodes of palpitation and tachycardia noted.  We will change amlodipine to diltiazem.  Continue other treatment and monitor.  Blood pressure starts elevating might need a combination of diltiazem and amlodipine  6/2021  Cardiac status stable.  Blood pressure much better controlled.  Heart rate better.  Palpitations are better continue treatment  11/2021  Stable cardiac status.  Intermittent use of nitroglycerin.  Continue current medical management  4/2022  Patient seen with c/o increased episodes of chest pain with dyspnea with worsening exercise tolerance.  He is using SL nitroglycerin more often. Will evaluate with stress test and echo. B/P controlled.  EKG SR with T wave abnormalities.  Continue current medications.  4/22/2022  Continues to have progression anginal pain requiring multiple nitroglycerin.  Class III.  Even though stress test is negative, concerned about progressive

## 2024-03-25 ENCOUNTER — TELEPHONE (OUTPATIENT)
Age: 59
End: 2024-03-25

## 2024-03-25 NOTE — TELEPHONE ENCOUNTER
----- Message from VERONA Rausch NP sent at 3/25/2024 12:30 PM EDT -----  Please schedule cardiac cath  ----- Message -----  From: Calvin Bishop MD  Sent: 3/23/2024   5:17 PM EDT  To: VERONA Rausch NP    I looked at the cath images  No obstructive diasease in 05/202    Stress appears to be abnormal in diagonal distribution. Cath in 2022 showed large diagonal with probably 40% tubular lesion.   If stress abnormal in that area and now EF has dropped, and symptoms concerning, may be worth looking in to diagnostic cath    Also looked at stress images. Clearly has reversible defect    I would agree    ----- Message -----  From: Breonna Marinelli APRN - NP  Sent: 3/22/2024   9:47 AM EDT  To: Calvin VENTURA MD    Patient with h/o coronary spasms  - diagnosed with cath in [place]  Most recent cath 5/2022  · No evidence of any significant critical disease in the coronary arteries other than diffuse luminal irregularities.       #Seen for dyspnea and fatigue ordered stress test:    Nuclear stress test  ·  Stress Test: A pharmacological stress test was performed using lexiscan.  ·  ECG: Resting ECG demonstrates normal sinus rhythm.  ·  Stress Function: Post-stress ejection fraction is 42%, moderately reduced, global hypokinesis.  ·  Perfusion Conclusion: TID ratio is 0.90.  ·  Perfusion Comments: LV perfusion is abnormal.  ·  Perfusion Defect: There is a moderate severity left ventricular stress perfusion defect that is small to medium in size present in the mid to distal lateral and apex segment(s). The defect appears to be probable ischemia.  ·  Stress Combined Conclusion: Findings suggest a moderate risk of cardiac events.       # I have ordered echo - should we repeat cardiac cath?  Current meds Diltiazem, Imdur, aspirin, rosuvastatin.    Any advice is appreciated, thanks!

## 2024-03-25 NOTE — TELEPHONE ENCOUNTER
Spoke to patient per Breonna Marinelli NP patient insurance auth is pending for cath will call him once we get approval. He voices understanding and acceptance of this advice and will call back if any further questions or concerns.

## 2024-03-26 NOTE — TELEPHONE ENCOUNTER
March 26, 2024 spoke with patient and he is aware of their Cath, date time, location and instructions. Was advised to call the office if he has any questions or concerns.

## 2024-03-26 NOTE — TELEPHONE ENCOUNTER
We have received his approval from patients insurance company and he is approved.     Auth #: [id number] 3/25 thru 6/25

## 2024-03-26 NOTE — TELEPHONE ENCOUNTER
[facility]      Left Heart Catheterization Instructions    You are scheduled to have a Left Heart Catheterization on 3/28/2024 at [facility]. Please arrive and check in at time provided by cath lab.     Please go to [facility] ([address] [place] [address]) and park in the outpatient parking lot that is located around to the back of the hospital ([facility]). You will enter through the [facility] entrance. Once you arrive, check in with the  at the [facility]  with the .     You are not to eat or drink anything after midnight the night before your procedure. Small sips of water with medications is OK.    If you are diabetic, do not take your insulin/sugar pill the morning of the procedure.     Medication Instructions:  Please Take your morning medications with the following special instructions:    [x] Please make sure to take your blood pressure medications with just enough water to swallow    [x]Take your Aspirin and or Plavix/Brilinta with just enough water to swallow    [] Hold (DO NOT TAKE) your [Eliquis, Xarelto, Coumadin, and or Metformin] on . Ask your nurse after the procedure when to resume these mediations.     []  If you have an allergy to Iodine, Contrast, or Shellfish: take Prednisone 60 mg and Benadryl 25 mg by mouth at at bed time the night before the procedure and again morning of the procedure.     6.  We encourage families to wait in the waiting room on the first floor while the procedure is being done. The Doctor will come out and talk with you as soon as the procedure is through. You will need someone to drive you home after you have been discharged from the hospital.     7.  There is a possibility you may spend the night in the hospital depending on the results of the procedure. This will be determined after the procedure is done.     8. If you or your family have any questions, please call our

## 2024-03-28 ENCOUNTER — HOSPITAL ENCOUNTER (OUTPATIENT)
Facility: HOSPITAL | Age: 59
Setting detail: OUTPATIENT SURGERY
Discharge: HOME | End: 2024-03-28
Attending: INTERNAL MEDICINE | Admitting: INTERNAL MEDICINE
Payer: COMMERCIAL

## 2024-03-28 VITALS
HEIGHT: 69 IN | HEART RATE: 63 BPM | SYSTOLIC BLOOD PRESSURE: 144 MMHG | WEIGHT: 220 LBS | RESPIRATION RATE: 18 BRPM | OXYGEN SATURATION: 99 % | BODY MASS INDEX: 32.58 KG/M2 | DIASTOLIC BLOOD PRESSURE: 78 MMHG

## 2024-03-28 DIAGNOSIS — I25.10 ATHEROSCLEROSIS OF NATIVE CORONARY ARTERY, UNSPECIFIED WHETHER ANGINA PRESENT, UNSPECIFIED WHETHER NATIVE OR TRANSPLANTED HEART: ICD-10-CM

## 2024-03-28 LAB
ANION GAP SERPL CALC-SCNC: 4 MMOL/L (ref 3–18)
APTT PPP: 28.4 SEC (ref 23–36.4)
BASOPHILS # BLD: 0 K/UL (ref 0–0.1)
BASOPHILS NFR BLD: 1 % (ref 0–2)
BUN SERPL-MCNC: 10 MG/DL (ref 7–18)
BUN/CREAT SERPL: 10 (ref 12–20)
CALCIUM SERPL-MCNC: 9.3 MG/DL (ref 8.5–10.1)
CHLORIDE SERPL-SCNC: 107 MMOL/L (ref 100–111)
CO2 SERPL-SCNC: 29 MMOL/L (ref 21–32)
CREAT SERPL-MCNC: 1.01 MG/DL (ref 0.6–1.3)
DIFFERENTIAL METHOD BLD: NORMAL
EOSINOPHIL # BLD: 0.2 K/UL (ref 0–0.4)
EOSINOPHIL NFR BLD: 3 % (ref 0–5)
ERYTHROCYTE [DISTWIDTH] IN BLOOD BY AUTOMATED COUNT: 13.4 % (ref 11.6–14.5)
GLUCOSE SERPL-MCNC: 87 MG/DL (ref 74–99)
HCT VFR BLD AUTO: 47.2 % (ref 36–48)
HGB BLD-MCNC: 15.8 G/DL (ref 13–16)
IMM GRANULOCYTES # BLD AUTO: 0 K/UL (ref 0–0.04)
IMM GRANULOCYTES NFR BLD AUTO: 0 % (ref 0–0.5)
INR PPP: 1 (ref 0.9–1.1)
LYMPHOCYTES # BLD: 1.8 K/UL (ref 0.9–3.6)
LYMPHOCYTES NFR BLD: 31 % (ref 21–52)
MCH RBC QN AUTO: 29.2 PG (ref 24–34)
MCHC RBC AUTO-ENTMCNC: 33.5 G/DL (ref 31–37)
MCV RBC AUTO: 87.2 FL (ref 78–100)
MONOCYTES # BLD: 0.5 K/UL (ref 0.05–1.2)
MONOCYTES NFR BLD: 9 % (ref 3–10)
NEUTS SEG # BLD: 3.3 K/UL (ref 1.8–8)
NEUTS SEG NFR BLD: 57 % (ref 40–73)
NRBC # BLD: 0 K/UL (ref 0–0.01)
NRBC BLD-RTO: 0 PER 100 WBC
PLATELET # BLD AUTO: 205 K/UL (ref 135–420)
PMV BLD AUTO: 10.6 FL (ref 9.2–11.8)
POTASSIUM SERPL-SCNC: 4 MMOL/L (ref 3.5–5.5)
PROTHROMBIN TIME: 13.1 SEC (ref 11.9–14.7)
RBC # BLD AUTO: 5.41 M/UL (ref 4.35–5.65)
SODIUM SERPL-SCNC: 140 MMOL/L (ref 136–145)
WBC # BLD AUTO: 5.8 K/UL (ref 4.6–13.2)

## 2024-03-28 PROCEDURE — 2709999900 HC NON-CHARGEABLE SUPPLY: Performed by: INTERNAL MEDICINE

## 2024-03-28 PROCEDURE — 93458 L HRT ARTERY/VENTRICLE ANGIO: CPT | Performed by: INTERNAL MEDICINE

## 2024-03-28 PROCEDURE — 85610 PROTHROMBIN TIME: CPT

## 2024-03-28 PROCEDURE — 6360000002 HC RX W HCPCS: Performed by: INTERNAL MEDICINE

## 2024-03-28 PROCEDURE — 76000 FLUOROSCOPY <1 HR PHYS/QHP: CPT | Performed by: INTERNAL MEDICINE

## 2024-03-28 PROCEDURE — 6360000004 HC RX CONTRAST MEDICATION: Performed by: INTERNAL MEDICINE

## 2024-03-28 PROCEDURE — C1769 GUIDE WIRE: HCPCS | Performed by: INTERNAL MEDICINE

## 2024-03-28 PROCEDURE — 85730 THROMBOPLASTIN TIME PARTIAL: CPT

## 2024-03-28 PROCEDURE — 80048 BASIC METABOLIC PNL TOTAL CA: CPT

## 2024-03-28 PROCEDURE — 99152 MOD SED SAME PHYS/QHP 5/>YRS: CPT | Performed by: INTERNAL MEDICINE

## 2024-03-28 PROCEDURE — C1713 ANCHOR/SCREW BN/BN,TIS/BN: HCPCS | Performed by: INTERNAL MEDICINE

## 2024-03-28 PROCEDURE — 2500000003 HC RX 250 WO HCPCS: Performed by: INTERNAL MEDICINE

## 2024-03-28 PROCEDURE — 85025 COMPLETE CBC W/AUTO DIFF WBC: CPT

## 2024-03-28 RX ORDER — SODIUM CHLORIDE 0.9 % (FLUSH) 0.9 %
5-40 SYRINGE (ML) INJECTION PRN
Status: DISCONTINUED | OUTPATIENT
Start: 2024-03-28 | End: 2024-03-28 | Stop reason: HOSPADM

## 2024-03-28 RX ORDER — HEPARIN SODIUM 1000 [USP'U]/ML
INJECTION, SOLUTION INTRAVENOUS; SUBCUTANEOUS PRN
Status: DISCONTINUED | OUTPATIENT
Start: 2024-03-28 | End: 2024-03-28 | Stop reason: HOSPADM

## 2024-03-28 RX ORDER — SODIUM CHLORIDE 0.9 % (FLUSH) 0.9 %
5-40 SYRINGE (ML) INJECTION EVERY 12 HOURS SCHEDULED
Status: DISCONTINUED | OUTPATIENT
Start: 2024-03-28 | End: 2024-03-28 | Stop reason: HOSPADM

## 2024-03-28 RX ORDER — ACETAMINOPHEN 325 MG/1
650 TABLET ORAL EVERY 4 HOURS PRN
Status: DISCONTINUED | OUTPATIENT
Start: 2024-03-28 | End: 2024-03-28 | Stop reason: HOSPADM

## 2024-03-28 RX ORDER — SODIUM CHLORIDE 9 MG/ML
INJECTION, SOLUTION INTRAVENOUS CONTINUOUS
Status: DISCONTINUED | OUTPATIENT
Start: 2024-03-28 | End: 2024-03-28 | Stop reason: HOSPADM

## 2024-03-28 RX ORDER — IODIXANOL 320 MG/ML
INJECTION, SOLUTION INTRAVASCULAR PRN
Status: DISCONTINUED | OUTPATIENT
Start: 2024-03-28 | End: 2024-03-28 | Stop reason: HOSPADM

## 2024-03-28 RX ORDER — MIDAZOLAM HYDROCHLORIDE 1 MG/ML
INJECTION INTRAMUSCULAR; INTRAVENOUS PRN
Status: DISCONTINUED | OUTPATIENT
Start: 2024-03-28 | End: 2024-03-28 | Stop reason: HOSPADM

## 2024-03-28 RX ORDER — FENTANYL CITRATE 50 UG/ML
INJECTION, SOLUTION INTRAMUSCULAR; INTRAVENOUS PRN
Status: DISCONTINUED | OUTPATIENT
Start: 2024-03-28 | End: 2024-03-28 | Stop reason: HOSPADM

## 2024-03-28 NOTE — PROGRESS NOTES
Cath holding summary:    0932: Patient ambulated from waiting area without difficulty, placed on monitor. A&O x 4, no c/o pain. NPO since midnight, ID and allergies verified. H&P reviewed, med rec completed. PIV x 2 inserted, blood sent to lab. Groin prep completed, consent ready for signature.    1110: Verbal report given to Donnie Contreras RN on Tony Olguin being transferred to Cath Lab for ordered procedure. Report consisted of patient's Situation, Background, Assessment and Recommendations (SBAR). Information from the following report(s) Nurse Handoff Report, Adult Overview, Intake/Output, and MAR was reviewed with the receiving nurse. Opportunity for questions and clarification was provided.    1155: Verbal report received from SELWYN Hui on Tony Olguin being received from Cath Lab for ordered procedure. Report consisted of patient's Situation, Background, Assessment and Recommendations (SBAR). Information from the following report(s) Nurse Handoff Report, Adult Overview, Surgery Report, and MAR was reviewed with the receiving nurse. Pt A&O x 4, no c/o pain. Opportunity for questions and clarification provided.  Procedure: Cardiac Cath  Intervention: No  Site: Right, Arm    1250: R band removed from right wrist per protocol. No bleeding or hematoma noted, site covered with hemostatic dressing and tegaderm. Patient denies pain, pulse palpable and brisk cap refill distal to site.  Cath site instructions and post care including s/s of bleeding and methods of bleeding prevention reviewed with patient who verbalized understanding.    1400: AVS Discharge instructions reviewed with patient, all questions answered. Patient verbalized understanding, copy given. Procedural site within normal limits, PIV removed. No hematoma or bleeding noted from procedural or IV site. A&Ox4 no c/o pain, discharged in stable condition. Escorted out to vehicle for transport home.

## 2024-03-28 NOTE — INTERVAL H&P NOTE
Update History & Physical    The patient's History and Physical of March 22, 2024 was reviewed with the patient and I examined the patient. There was no change. The surgical site was confirmed by the patient and me.     The benefits and risks of cardiac catheterization have been discussed in detail with the patient or healthcare power of . Patient understands  risk of potential cath complications including but not limited to bleeding, infection, dialysis or renal function decline, difficulty healing the arteriotomy access site which may require surgical repair, potential thromboembolic complications which could result in stroke, myocardial infarction, vascular injury, loss of limb or organ function and/or death and potential allergic reaction to contrast dye or other medication used during the procedure. Patient is also aware of the therapeutic implications for medical management vs coronary artery bypass surgery vs percutaneous coronary intervention in treatment of coronary artery disease. The additional risks for percutaneous coronary artery intervention include the need for emergent bypass surgery  and for restenosis for plain balloon angioplasty, for bare metal stent, and for drug eluting stent implants. The need for mandatory uninterrupted dual antiplatelet therapy with lifelong Aspirin combined with Plavix or similar for up to 12 months following drug eluting stents, and minimum 1 month following bare metal stents to prevent stent thrombosis which is the equivalent of acute heart attack has been reviewed in detail.  Patient or healthcare power of  verbalized understanding and all questions were answered.    EXAMINATION:  General:  Alert, cooperative, no distress  Head:  Normocephalic, without obvious abnormality, atraumatic.  Eyes:  Conjunctivae/corneas clear  Lungs:   Clear to auscultation bilaterally, no wheezes, no rales, no rhonchi  Heart:  Regular rate and rhythm, S1, S2 normal, no murmur,  click, rub or gallop.  Abdomen:   Soft, non-tender. Bowel sounds normal.   Extremities: Extremities normal, no edema  Skin:  No rashes or lesions visible extremities  Neurologic:  Normal strength, sensation      Plan: The risks, benefits, expected outcome, and alternative to the recommended procedure have been discussed with the patient. Patient understands and wants to proceed with the procedure.     Electronically signed by HEMAL GIBSON MD on 3/28/2024 at 11:07 AM

## 2024-03-29 LAB — ECHO BSA: 2.2 M2

## 2024-04-15 DIAGNOSIS — I10 ESSENTIAL (PRIMARY) HYPERTENSION: ICD-10-CM

## 2024-04-15 RX ORDER — ISOSORBIDE MONONITRATE 60 MG/1
60 TABLET, EXTENDED RELEASE ORAL DAILY
Qty: 90 TABLET | Refills: 3 | Status: SHIPPED | OUTPATIENT
Start: 2024-04-15

## 2024-04-23 DIAGNOSIS — I10 ESSENTIAL (PRIMARY) HYPERTENSION: ICD-10-CM

## 2024-04-23 RX ORDER — DILTIAZEM HYDROCHLORIDE 240 MG/1
240 CAPSULE, COATED, EXTENDED RELEASE ORAL DAILY
Qty: 90 CAPSULE | Refills: 2 | Status: SHIPPED | OUTPATIENT
Start: 2024-04-23

## 2024-06-24 ENCOUNTER — HOSPITAL ENCOUNTER (OUTPATIENT)
Facility: HOSPITAL | Age: 59
Discharge: HOME | End: 2024-06-27
Payer: COMMERCIAL

## 2024-06-24 DIAGNOSIS — R73.03 PREDIABETES: ICD-10-CM

## 2024-06-24 DIAGNOSIS — E78.5 HYPERLIPIDEMIA, UNSPECIFIED HYPERLIPIDEMIA TYPE: ICD-10-CM

## 2024-06-24 DIAGNOSIS — I10 ESSENTIAL (PRIMARY) HYPERTENSION: ICD-10-CM

## 2024-06-24 LAB
ALBUMIN SERPL-MCNC: 4.1 G/DL (ref 3.4–5)
ALBUMIN/GLOB SERPL: 1.2 (ref 0.8–1.7)
ALP SERPL-CCNC: 117 U/L (ref 45–117)
ALT SERPL-CCNC: 56 U/L (ref 16–61)
ANION GAP SERPL CALC-SCNC: 4 MMOL/L (ref 3–18)
AST SERPL-CCNC: 30 U/L (ref 10–38)
BASOPHILS # BLD: 0 K/UL (ref 0–0.1)
BASOPHILS NFR BLD: 1 % (ref 0–2)
BILIRUB SERPL-MCNC: 0.3 MG/DL (ref 0.2–1)
BUN SERPL-MCNC: 12 MG/DL (ref 7–18)
BUN/CREAT SERPL: 12 (ref 12–20)
CALCIUM SERPL-MCNC: 9.9 MG/DL (ref 8.5–10.1)
CHLORIDE SERPL-SCNC: 108 MMOL/L (ref 100–111)
CHOLEST SERPL-MCNC: 136 MG/DL
CO2 SERPL-SCNC: 28 MMOL/L (ref 21–32)
CREAT SERPL-MCNC: 1.04 MG/DL (ref 0.6–1.3)
DIFFERENTIAL METHOD BLD: NORMAL
EOSINOPHIL # BLD: 0.2 K/UL (ref 0–0.4)
EOSINOPHIL NFR BLD: 3 % (ref 0–5)
ERYTHROCYTE [DISTWIDTH] IN BLOOD BY AUTOMATED COUNT: 14 % (ref 11.6–14.5)
EST. AVERAGE GLUCOSE BLD GHB EST-MCNC: 111 MG/DL
GLOBULIN SER CALC-MCNC: 3.3 G/DL (ref 2–4)
GLUCOSE SERPL-MCNC: 87 MG/DL (ref 74–99)
HBA1C MFR BLD: 5.5 % (ref 4.2–5.6)
HCT VFR BLD AUTO: 42.7 % (ref 36–48)
HDLC SERPL-MCNC: 45 MG/DL (ref 40–60)
HDLC SERPL: 3 (ref 0–5)
HGB BLD-MCNC: 14 G/DL (ref 13–16)
IMM GRANULOCYTES # BLD AUTO: 0 K/UL (ref 0–0.04)
IMM GRANULOCYTES NFR BLD AUTO: 0 % (ref 0–0.5)
LDLC SERPL CALC-MCNC: 72.6 MG/DL (ref 0–100)
LIPID PANEL: NORMAL
LYMPHOCYTES # BLD: 1.9 K/UL (ref 0.9–3.6)
LYMPHOCYTES NFR BLD: 32 % (ref 21–52)
MCH RBC QN AUTO: 29 PG (ref 24–34)
MCHC RBC AUTO-ENTMCNC: 32.8 G/DL (ref 31–37)
MCV RBC AUTO: 88.6 FL (ref 78–100)
MONOCYTES # BLD: 0.5 K/UL (ref 0.05–1.2)
MONOCYTES NFR BLD: 9 % (ref 3–10)
NEUTS SEG # BLD: 3.3 K/UL (ref 1.8–8)
NEUTS SEG NFR BLD: 56 % (ref 40–73)
NRBC # BLD: 0 K/UL (ref 0–0.01)
NRBC BLD-RTO: 0 PER 100 WBC
PLATELET # BLD AUTO: 226 K/UL (ref 135–420)
PMV BLD AUTO: 10.4 FL (ref 9.2–11.8)
POTASSIUM SERPL-SCNC: 4.9 MMOL/L (ref 3.5–5.5)
PROT SERPL-MCNC: 7.4 G/DL (ref 6.4–8.2)
RBC # BLD AUTO: 4.82 M/UL (ref 4.35–5.65)
SODIUM SERPL-SCNC: 140 MMOL/L (ref 136–145)
TRIGL SERPL-MCNC: 92 MG/DL
VLDLC SERPL CALC-MCNC: 18.4 MG/DL
WBC # BLD AUTO: 5.9 K/UL (ref 4.6–13.2)

## 2024-06-24 PROCEDURE — 80053 COMPREHEN METABOLIC PANEL: CPT

## 2024-06-24 PROCEDURE — 36415 COLL VENOUS BLD VENIPUNCTURE: CPT

## 2024-06-24 PROCEDURE — 83036 HEMOGLOBIN GLYCOSYLATED A1C: CPT

## 2024-06-24 PROCEDURE — 80061 LIPID PANEL: CPT

## 2024-06-24 PROCEDURE — 85025 COMPLETE CBC W/AUTO DIFF WBC: CPT

## 2024-06-25 NOTE — PROGRESS NOTES
Chief Complaint   Patient presents with    Results     Review of results     PRE-DM    Gastroesophageal Reflux    Hypertension    Sleep Apnea    Depression    Benign Prostatic Hypertrophy      \"Have you been to the ER, urgent care clinic since your last visit?  Hospitalized since your last visit?\"    NO    “Have you seen or consulted any other health care providers outside of [facility] since your last visit?”    NO          Click Here for Release of Records Request

## 2024-06-26 ENCOUNTER — OFFICE VISIT (OUTPATIENT)
Facility: CLINIC | Age: 59
End: 2024-06-26
Payer: COMMERCIAL

## 2024-06-26 VITALS
RESPIRATION RATE: 18 BRPM | WEIGHT: 220 LBS | HEIGHT: 69 IN | SYSTOLIC BLOOD PRESSURE: 138 MMHG | DIASTOLIC BLOOD PRESSURE: 74 MMHG | OXYGEN SATURATION: 98 % | BODY MASS INDEX: 32.58 KG/M2 | HEART RATE: 75 BPM | TEMPERATURE: 98 F

## 2024-06-26 DIAGNOSIS — G43.809 OTHER MIGRAINE WITHOUT STATUS MIGRAINOSUS, NOT INTRACTABLE: ICD-10-CM

## 2024-06-26 DIAGNOSIS — E78.5 HYPERLIPIDEMIA, UNSPECIFIED HYPERLIPIDEMIA TYPE: ICD-10-CM

## 2024-06-26 DIAGNOSIS — E66.09 OBESITY DUE TO EXCESS CALORIES WITH SERIOUS COMORBIDITY, UNSPECIFIED CLASSIFICATION: ICD-10-CM

## 2024-06-26 DIAGNOSIS — N20.0 CALCULUS OF KIDNEY: ICD-10-CM

## 2024-06-26 DIAGNOSIS — K21.9 GASTRO-ESOPHAGEAL REFLUX DISEASE WITHOUT ESOPHAGITIS: ICD-10-CM

## 2024-06-26 DIAGNOSIS — E55.9 VITAMIN D DEFICIENCY: ICD-10-CM

## 2024-06-26 DIAGNOSIS — R73.03 PREDIABETES: ICD-10-CM

## 2024-06-26 DIAGNOSIS — G47.33 OBSTRUCTIVE SLEEP APNEA (ADULT) (PEDIATRIC): ICD-10-CM

## 2024-06-26 DIAGNOSIS — I10 ESSENTIAL (PRIMARY) HYPERTENSION: Primary | ICD-10-CM

## 2024-06-26 DIAGNOSIS — F32.4 MAJOR DEPRESSIVE DISORDER, SINGLE EPISODE, IN PARTIAL REMISSION (HCC): ICD-10-CM

## 2024-06-26 DIAGNOSIS — Z99.89 DEPENDENCE ON OTHER ENABLING MACHINES AND DEVICES: ICD-10-CM

## 2024-06-26 DIAGNOSIS — N40.0 BENIGN PROSTATIC HYPERPLASIA WITHOUT LOWER URINARY TRACT SYMPTOMS: ICD-10-CM

## 2024-06-26 PROCEDURE — 99214 OFFICE O/P EST MOD 30 MIN: CPT | Performed by: FAMILY MEDICINE

## 2024-06-26 PROCEDURE — 3078F DIAST BP <80 MM HG: CPT | Performed by: FAMILY MEDICINE

## 2024-06-26 PROCEDURE — 3075F SYST BP GE 130 - 139MM HG: CPT | Performed by: FAMILY MEDICINE

## 2024-06-26 RX ORDER — NORTRIPTYLINE HYDROCHLORIDE 50 MG/1
CAPSULE ORAL
Qty: 90 CAPSULE | Refills: 3 | Status: SHIPPED | OUTPATIENT
Start: 2024-06-26

## 2024-06-26 RX ORDER — LIRAGLUTIDE 6 MG/ML
INJECTION, SOLUTION SUBCUTANEOUS
Qty: 45 ML | Refills: 3 | Status: SHIPPED | OUTPATIENT
Start: 2024-06-26

## 2024-06-26 RX ORDER — ERGOCALCIFEROL 1.25 MG/1
50000 CAPSULE ORAL
Qty: 13 CAPSULE | Refills: 3 | Status: SHIPPED | OUTPATIENT
Start: 2024-06-26

## 2024-06-26 RX ORDER — ROSUVASTATIN CALCIUM 10 MG/1
10 TABLET, COATED ORAL NIGHTLY
Qty: 90 TABLET | Refills: 1 | Status: SHIPPED | OUTPATIENT
Start: 2024-06-26

## 2024-06-26 RX ORDER — OMEPRAZOLE 40 MG/1
40 CAPSULE, DELAYED RELEASE ORAL DAILY
Qty: 90 CAPSULE | Refills: 3 | Status: SHIPPED | OUTPATIENT
Start: 2024-06-26

## 2024-06-26 RX ORDER — SERTRALINE HYDROCHLORIDE 100 MG/1
100 TABLET, FILM COATED ORAL DAILY
Qty: 90 TABLET | Refills: 1 | Status: SHIPPED | OUTPATIENT
Start: 2024-06-26

## 2024-06-26 SDOH — ECONOMIC STABILITY: FOOD INSECURITY: WITHIN THE PAST 12 MONTHS, THE FOOD YOU BOUGHT JUST DIDN'T LAST AND YOU DIDN'T HAVE MONEY TO GET MORE.: NEVER TRUE

## 2024-06-26 SDOH — ECONOMIC STABILITY: FOOD INSECURITY: WITHIN THE PAST 12 MONTHS, YOU WORRIED THAT YOUR FOOD WOULD RUN OUT BEFORE YOU GOT MONEY TO BUY MORE.: NEVER TRUE

## 2024-06-26 SDOH — ECONOMIC STABILITY: INCOME INSECURITY: HOW HARD IS IT FOR YOU TO PAY FOR THE VERY BASICS LIKE FOOD, HOUSING, MEDICAL CARE, AND HEATING?: NOT HARD AT ALL

## 2024-06-26 ASSESSMENT — PATIENT HEALTH QUESTIONNAIRE - PHQ9
SUM OF ALL RESPONSES TO PHQ QUESTIONS 1-9: 3
3. TROUBLE FALLING OR STAYING ASLEEP: SEVERAL DAYS
4. FEELING TIRED OR HAVING LITTLE ENERGY: NOT AT ALL
9. THOUGHTS THAT YOU WOULD BE BETTER OFF DEAD, OR OF HURTING YOURSELF: NOT AT ALL
SUM OF ALL RESPONSES TO PHQ QUESTIONS 1-9: 3
6. FEELING BAD ABOUT YOURSELF - OR THAT YOU ARE A FAILURE OR HAVE LET YOURSELF OR YOUR FAMILY DOWN: SEVERAL DAYS
10. IF YOU CHECKED OFF ANY PROBLEMS, HOW DIFFICULT HAVE THESE PROBLEMS MADE IT FOR YOU TO DO YOUR WORK, TAKE CARE OF THINGS AT HOME, OR GET ALONG WITH OTHER PEOPLE: NOT DIFFICULT AT ALL
SUM OF ALL RESPONSES TO PHQ QUESTIONS 1-9: 3
SUM OF ALL RESPONSES TO PHQ9 QUESTIONS 1 & 2: 0
1. LITTLE INTEREST OR PLEASURE IN DOING THINGS: NOT AT ALL
8. MOVING OR SPEAKING SO SLOWLY THAT OTHER PEOPLE COULD HAVE NOTICED. OR THE OPPOSITE, BEING SO FIGETY OR RESTLESS THAT YOU HAVE BEEN MOVING AROUND A LOT MORE THAN USUAL: NOT AT ALL
SUM OF ALL RESPONSES TO PHQ QUESTIONS 1-9: 3
2. FEELING DOWN, DEPRESSED OR HOPELESS: NOT AT ALL
5. POOR APPETITE OR OVEREATING: NOT AT ALL
7. TROUBLE CONCENTRATING ON THINGS, SUCH AS READING THE NEWSPAPER OR WATCHING TELEVISION: SEVERAL DAYS

## 2024-06-26 NOTE — PROGRESS NOTES
SUBJECTIVE  Chief Complaint   Patient presents with    Results     Review of results     PRE-DM    Gastroesophageal Reflux    Hypertension    Sleep Apnea    Depression    Benign Prostatic Hypertrophy      He has metabolic disease in the way of hypertension, prediabetes, hyperlipidemia in the setting of obesity.  He reports compliance with his current medications without side effects.       He has been on Saxenda without any adverse side effects.  He has lost weight as a result of not being able to eat as much on the medication.   Has had issues with being able to get the medication due to shortages.    He has depression that has been well-controlled on Zoloft.  He has no side effects.  No harmful ideations outwardly expressed.     He has GERD controlled on PPIs which he takes as needed.  That may have improved with the weight loss as well.  He has had colorectal cancer screening updated at the end of 2021.      He has sleep apnea and chronic daily headaches controlled on Pamelor and with the use of CPAP for ALEX.      He has BPH and a family history of prostate CA.  He has had kidney stones as well and sees a urologist. He has a procedure scheduled in Aug to shrink the prostate.    Has follow-up with cardiology for abn ECHO.  Has had another cath this year and was normal.  Is doing well on Imdur.    OBJECTIVE    Blood pressure 138/74, pulse 75, temperature 98 °F (36.7 °C), temperature source Temporal, resp. rate 18, height 1.753 m (5' 9\"), weight 99.8 kg (220 lb), SpO2 98 %.     General:  Alert, cooperative, well appearing, in no apparent distress.  CV:  The heart sounds are regular in rate and rhythm.  There is a normal S1 and S2.  There or no murmurs  Lungs:  Inspiratory and expiratory efforts are full and unlabored.  Lung sounds are clear and equal to auscultation throughout all lung fields without wheezing, rales, or rhonchi.  Skin:  No rashes, no jaundice.  Psych: normal affect.  Mood good.  Oriented x 3.

## 2024-07-18 DIAGNOSIS — I10 ESSENTIAL (PRIMARY) HYPERTENSION: ICD-10-CM

## 2024-07-18 RX ORDER — DILTIAZEM HYDROCHLORIDE 240 MG/1
CAPSULE, COATED, EXTENDED RELEASE ORAL DAILY
Qty: 30 CAPSULE | Refills: 3 | Status: SHIPPED | OUTPATIENT
Start: 2024-07-18

## 2024-07-19 ENCOUNTER — OFFICE VISIT (OUTPATIENT)
Age: 59
End: 2024-07-19
Payer: COMMERCIAL

## 2024-07-19 VITALS
OXYGEN SATURATION: 97 % | SYSTOLIC BLOOD PRESSURE: 133 MMHG | HEART RATE: 77 BPM | BODY MASS INDEX: 32.58 KG/M2 | HEIGHT: 69 IN | WEIGHT: 220 LBS | DIASTOLIC BLOOD PRESSURE: 86 MMHG

## 2024-07-19 DIAGNOSIS — G47.33 OBSTRUCTIVE SLEEP APNEA (ADULT) (PEDIATRIC): ICD-10-CM

## 2024-07-19 DIAGNOSIS — I25.10 ATHEROSCLEROSIS OF NATIVE CORONARY ARTERY OF NATIVE HEART WITHOUT ANGINA PECTORIS: Primary | ICD-10-CM

## 2024-07-19 DIAGNOSIS — R55 VASOVAGAL SYNCOPE: ICD-10-CM

## 2024-07-19 DIAGNOSIS — I10 ESSENTIAL (PRIMARY) HYPERTENSION: ICD-10-CM

## 2024-07-19 DIAGNOSIS — E78.2 MIXED HYPERLIPIDEMIA: ICD-10-CM

## 2024-07-19 PROCEDURE — 99214 OFFICE O/P EST MOD 30 MIN: CPT | Performed by: NURSE PRACTITIONER

## 2024-07-19 PROCEDURE — 3075F SYST BP GE 130 - 139MM HG: CPT | Performed by: NURSE PRACTITIONER

## 2024-07-19 PROCEDURE — 3079F DIAST BP 80-89 MM HG: CPT | Performed by: NURSE PRACTITIONER

## 2024-07-19 ASSESSMENT — PATIENT HEALTH QUESTIONNAIRE - PHQ9
SUM OF ALL RESPONSES TO PHQ QUESTIONS 1-9: 0
SUM OF ALL RESPONSES TO PHQ QUESTIONS 1-9: 0
1. LITTLE INTEREST OR PLEASURE IN DOING THINGS: NOT AT ALL
SUM OF ALL RESPONSES TO PHQ QUESTIONS 1-9: 0
2. FEELING DOWN, DEPRESSED OR HOPELESS: NOT AT ALL
DEPRESSION UNABLE TO ASSESS: FUNCTIONAL CAPACITY MOTIVATION LIMITS ACCURACY
SUM OF ALL RESPONSES TO PHQ QUESTIONS 1-9: 0
SUM OF ALL RESPONSES TO PHQ9 QUESTIONS 1 & 2: 0

## 2024-07-19 NOTE — PROGRESS NOTES
HISTORY OF PRESENT ILLNESS  Tony Olguin is a 57 y.o. male.    Patient with cad,coronary spasm,htn  On follow up patient denies any sob, palpitation or other significant symptoms.  2/2020  Seen for follow-up after recent admission.  Episode of syncope started while he was standing in line for longer time started with dizziness subsequent syncope.  Occasional lightheaded when standing in 1 position.  He had positive tilt table in 2013.  No recent syncopal episodes.  1/2021  Patient seen today for follow-up.  Complains of chest tightness on the left side.  Under significant stress at present.  Also feels malaise and fatigue.  4/2022  Patient with h/o coronary spasm, seen today for complaints of chest discomfort with shortness of breath.  This has been ongoing x 2 weeks - worse with physical activity and resolves with rest or nitroglycerin.  He reports ongoing rare palpitations, denies edema.    Follow-up  Associated symptoms include chest pain and shortness of breath.   Hypertension  The history is provided by the Patient. This is a chronic problem. The problem occurs constantly. The problem has not changed since onset.Associated symptoms include chest pain and shortness of breath.   Cholesterol Problem  The history is provided by the Patient. This is a chronic problem. The problem occurs constantly. The problem has not changed since onset.Associated symptoms include chest pain and shortness of breath.   Chest Pain (Angina)   The history is provided by the Patient. This is a recurrent problem. The problem has been gradually worsening. The problem occurs every several days. The pain is associated with exertion and rest. The pain is present in the substernal region. The pain is mild. The quality of the pain is described as pressure-like. The pain does not radiate. Associated symptoms include palpitations and shortness of breath. Pertinent negatives include no claudication, no cough, no dizziness, no fever, no

## 2024-07-22 NOTE — PROGRESS NOTES
Chief Complaint   Patient presents with    Pre-op Exam     Holmium, enucleation of prostate to be performed by Dr. Shama Foster at [facility] on 08/01/2024 under general anesthesia       \"Have you been to the ER, urgent care clinic since your last visit?  Hospitalized since your last visit?\"    NO    “Have you seen or consulted any other health care providers outside of [facility] since your last visit?”    NO      No updated immunization noted on [place] Immunization Registry as of July 22, 2024      Click Here for Release of Records Request

## 2024-07-22 NOTE — PATIENT INSTRUCTIONS
[employer] disclaims any warranty or liability for your use of this information.

## 2024-07-23 ENCOUNTER — OFFICE VISIT (OUTPATIENT)
Facility: CLINIC | Age: 59
End: 2024-07-23
Payer: COMMERCIAL

## 2024-07-23 VITALS
TEMPERATURE: 98 F | HEIGHT: 69 IN | OXYGEN SATURATION: 98 % | HEART RATE: 84 BPM | SYSTOLIC BLOOD PRESSURE: 120 MMHG | DIASTOLIC BLOOD PRESSURE: 74 MMHG | WEIGHT: 218 LBS | RESPIRATION RATE: 16 BRPM | BODY MASS INDEX: 32.29 KG/M2

## 2024-07-23 DIAGNOSIS — K21.9 GASTRO-ESOPHAGEAL REFLUX DISEASE WITHOUT ESOPHAGITIS: ICD-10-CM

## 2024-07-23 DIAGNOSIS — F32.4 MAJOR DEPRESSIVE DISORDER, SINGLE EPISODE, IN PARTIAL REMISSION (HCC): ICD-10-CM

## 2024-07-23 DIAGNOSIS — E78.5 HYPERLIPIDEMIA, UNSPECIFIED HYPERLIPIDEMIA TYPE: ICD-10-CM

## 2024-07-23 DIAGNOSIS — E55.9 VITAMIN D DEFICIENCY: ICD-10-CM

## 2024-07-23 DIAGNOSIS — I10 ESSENTIAL (PRIMARY) HYPERTENSION: ICD-10-CM

## 2024-07-23 DIAGNOSIS — N40.0 BENIGN PROSTATIC HYPERPLASIA WITHOUT LOWER URINARY TRACT SYMPTOMS: Primary | ICD-10-CM

## 2024-07-23 DIAGNOSIS — Z01.818 PRE-OPERATIVE CLEARANCE: ICD-10-CM

## 2024-07-23 DIAGNOSIS — N20.0 CALCULUS OF KIDNEY: ICD-10-CM

## 2024-07-23 DIAGNOSIS — E66.09 OBESITY DUE TO EXCESS CALORIES WITH SERIOUS COMORBIDITY, UNSPECIFIED CLASSIFICATION: ICD-10-CM

## 2024-07-23 DIAGNOSIS — G43.809 OTHER MIGRAINE WITHOUT STATUS MIGRAINOSUS, NOT INTRACTABLE: ICD-10-CM

## 2024-07-23 DIAGNOSIS — Z99.89 DEPENDENCE ON OTHER ENABLING MACHINES AND DEVICES: ICD-10-CM

## 2024-07-23 DIAGNOSIS — R73.03 PREDIABETES: ICD-10-CM

## 2024-07-23 DIAGNOSIS — G47.33 OBSTRUCTIVE SLEEP APNEA (ADULT) (PEDIATRIC): ICD-10-CM

## 2024-07-23 PROCEDURE — 3074F SYST BP LT 130 MM HG: CPT | Performed by: FAMILY MEDICINE

## 2024-07-23 PROCEDURE — 3078F DIAST BP <80 MM HG: CPT | Performed by: FAMILY MEDICINE

## 2024-07-23 PROCEDURE — 99214 OFFICE O/P EST MOD 30 MIN: CPT | Performed by: FAMILY MEDICINE

## 2024-07-23 NOTE — PROGRESS NOTES
Chief Complaint   Patient presents with    Pre-op Exam     Holmium, enucleation of prostate to be performed by Dr. Shama Foster at [facility] on 2024 under general anesthesia        Preoperative Evaluation    Date of Exam: 2024    Tony Olguin is a 59 y.o. male (:1965) who presents for preoperative evaluation.   Procedure/Surgery:  holmium enucleation of prostate  Date of Procedure/Surgery: 2024  Surgeon: Dr. Foster  Hospital/Surgical Facility: [facility])  Primary Physician: Nuno Ramachandran MD  Latex Allergy: no    Recent use of: No recent use of aspirin (ASA), NSAIDS or steroids    Tetanus up to date: tetanus status unknown to the patient    Anesthesia Complications: None  History of abnormal bleeding : None  History of Blood Transfusions: no    REVIEW OF SYSTEMS:  A comprehensive review of systems was negative except for that written in the HPI.    EXAM:   Visit Vitals  Blood pressure 120/74, pulse 84, temperature 98 °F (36.7 °C), temperature source Temporal, resp. rate 16, height 1.753 m (5' 9\"), weight 98.9 kg (218 lb), SpO2 98 %.  General appearance - alert, well appearing, and in no distress  Eyes - pupils equal and reactive, extraocular eye movements intact  Ears -  normal  Nose - normal and patent, no erythema, discharge or polyps  Mouth - mucous membranes moist, pharynx normal without lesions  Neck - supple, no significant adenopathy  Lymphatics - no palpable lymphadenopathy  Chest - clear to auscultation, no wheezes, rales or rhonchi, symmetric air entry  Heart - normal rate, regular rhythm, normal S1, S2, no murmurs, rubs, clicks or gallops  Abdomen - soft, nontender, nondistended, no masses or organomegaly  Neurological - motor and sensory grossly normal bilaterally  Extremities - no pedal edema, no clubbing or cyanosis  Skin - normal coloration and turgor, no rashes, no suspicious skin lesions noted      DIAGNOSTICS:   Pre-op testing pending at this

## 2024-10-17 ENCOUNTER — TELEPHONE (OUTPATIENT)
Age: 59
End: 2024-10-17

## 2024-10-17 NOTE — TELEPHONE ENCOUNTER
Spoke with Mr. Olguin about his CPAP use in relation to his upcoming appointment on 10-. Mr. Olguin reported that he uses his CPAP every night. Informed the patient to bring his SD card for compliance data.

## 2024-10-18 ENCOUNTER — OFFICE VISIT (OUTPATIENT)
Age: 59
End: 2024-10-18
Payer: COMMERCIAL

## 2024-10-18 ENCOUNTER — CLINICAL DOCUMENTATION (OUTPATIENT)
Age: 59
End: 2024-10-18

## 2024-10-18 VITALS
WEIGHT: 222 LBS | DIASTOLIC BLOOD PRESSURE: 71 MMHG | HEART RATE: 93 BPM | BODY MASS INDEX: 32.88 KG/M2 | HEIGHT: 69 IN | SYSTOLIC BLOOD PRESSURE: 122 MMHG | RESPIRATION RATE: 18 BRPM | TEMPERATURE: 97.4 F | OXYGEN SATURATION: 97 %

## 2024-10-18 DIAGNOSIS — G47.33 OSA ON CPAP: Primary | ICD-10-CM

## 2024-10-18 DIAGNOSIS — I25.111 CORONARY ARTERY DISEASE INVOLVING NATIVE CORONARY ARTERY OF NATIVE HEART WITH ANGINA PECTORIS WITH DOCUMENTED SPASM (HCC): ICD-10-CM

## 2024-10-18 DIAGNOSIS — R73.03 PREDIABETES: ICD-10-CM

## 2024-10-18 DIAGNOSIS — I10 ESSENTIAL HYPERTENSION WITH GOAL BLOOD PRESSURE LESS THAN 140/90: ICD-10-CM

## 2024-10-18 PROCEDURE — 3074F SYST BP LT 130 MM HG: CPT | Performed by: OTOLARYNGOLOGY

## 2024-10-18 PROCEDURE — 3078F DIAST BP <80 MM HG: CPT | Performed by: OTOLARYNGOLOGY

## 2024-10-18 PROCEDURE — 99204 OFFICE O/P NEW MOD 45 MIN: CPT | Performed by: OTOLARYNGOLOGY

## 2024-10-18 ASSESSMENT — SLEEP AND FATIGUE QUESTIONNAIRES
HOW LIKELY ARE YOU TO NOD OFF OR FALL ASLEEP WHILE LYING DOWN TO REST IN THE AFTERNOON WHEN CIRCUMSTANCES PERMIT: MODERATE CHANCE OF DOZING
HOW LIKELY ARE YOU TO NOD OFF OR FALL ASLEEP WHILE SITTING AND READING: WOULD NEVER DOZE
HOW LIKELY ARE YOU TO NOD OFF OR FALL ASLEEP WHILE SITTING INACTIVE IN A PUBLIC PLACE: WOULD NEVER DOZE
HOW LIKELY ARE YOU TO NOD OFF OR FALL ASLEEP WHILE SITTING AND TALKING TO SOMEONE: WOULD NEVER DOZE
HOW LIKELY ARE YOU TO NOD OFF OR FALL ASLEEP WHILE WATCHING TV: WOULD NEVER DOZE
HOW LIKELY ARE YOU TO NOD OFF OR FALL ASLEEP WHEN YOU ARE A PASSENGER IN A CAR FOR AN HOUR WITHOUT A BREAK: HIGH CHANCE OF DOZING
ESS TOTAL SCORE: 7
HOW LIKELY ARE YOU TO NOD OFF OR FALL ASLEEP WHILE SITTING QUIETLY AFTER LUNCH WITHOUT ALCOHOL: WOULD NEVER DOZE
HOW LIKELY ARE YOU TO NOD OFF OR FALL ASLEEP IN A CAR, WHILE STOPPED FOR A FEW MINUTES IN TRAFFIC: MODERATE CHANCE OF DOZING

## 2024-10-18 ASSESSMENT — PATIENT HEALTH QUESTIONNAIRE - PHQ9
1. LITTLE INTEREST OR PLEASURE IN DOING THINGS: NOT AT ALL
SUM OF ALL RESPONSES TO PHQ QUESTIONS 1-9: 0
2. FEELING DOWN, DEPRESSED OR HOPELESS: NOT AT ALL
SUM OF ALL RESPONSES TO PHQ QUESTIONS 1-9: 0
SUM OF ALL RESPONSES TO PHQ9 QUESTIONS 1 & 2: 0

## 2024-10-18 NOTE — PROGRESS NOTES
Tony Krishnaie presents today for   Chief Complaint   Patient presents with    Sleep Apnea       Is someone accompanying this pt? no    Is the patient using any DME equipment during OV? no    -DME Company: [employer]    Have you ever had a sleep study done before? yes    Depression Screening:      10/18/2024     1:37 PM   PHQ-9    Little interest or pleasure in doing things 0   Feeling down, depressed, or hopeless 0   PHQ-2 Score 0   PHQ-9 Total Score 0        [place] Sleepiness Scale:      10/18/2024     1:40 PM   Sleep Medicine   Sitting and reading 0   Watching TV 0   Sitting, inactive in a public place (e.g. a theatre or a meeting) 0   As a passenger in a car for an hour without a break 3   Lying down to rest in the afternoon when circumstances permit 2   Sitting and talking to someone 0   Sitting quietly after a lunch without alcohol 0   In a car, while stopped for a few minutes in traffic 2   [place] Sleepiness Score 7   Neck (Inches) 17       Stop-Bang:      10/18/2024     1:00 PM   STOP-BANG QUESTIONNAIRE   Are you a loud and/or regular snorer? 1   Do you often feel tired or groggy upon awakening or do you awaken with a headache? 1   Have you been observed to gasp or stop breathing during sleep? 1   Are you often tired or fatigued during wake time hours?  0   Do you fall asleep sitting, reading, watching TV or driving? 0   Do you often have problems with memory or concentration? 0   Do you have or are you being treated for high blood pressure? 1   Recent BMI (Calculated) 31.4   Is BMI greater than 35 kg/m2? 0=No   Age older than 50 years old? 1=Yes   Is your neck circumference greater than 17 inches (Male) or 16 inches (Female)? 0   Gender - Male 1=Yes   STOP-Bang Total Score 37.4         Coordination of Care:  1. Have you been to the ER, urgent care clinic since your last visit? Hospitalized since your last visit? no    2. Have you seen or consulted any other health care providers outside of the [facility]

## 2024-10-18 NOTE — PATIENT INSTRUCTIONS
Please make a follow up appointment to discuss the results of your sleep study. If this is impossible for some reason, please send me a \"My Chart\" message so that I may get back with you in a timely manner.    The [facility] Lab is located in the [facility], adjacent to [facility]. The lab is on the second floor. The direct number to call for sleep study related questions is: [phone].    Please call our clinic back at [phone] or send a message on [employer] if you have any questions or concerns or if you are experiencing any of the following:     You have not received a follow up appointment within 30 days prior the recommended follow up time.    If you are not tolerating treatment plan and/or not able to obtain equipment or prescribed medication(s).  if you are experiencing any difficulties with the Durable Medical Equipment  (DME) Company you may be using or is assigned to you.  Two weeks have passed and you have not received an appointment for a scheduled procedure.  Two weeks have passed since you underwent a test and/or procedure and you have not received your results.     If you are using a CPAP/BIPAP, or Home Ventilator Device- Please note the following.  Currently, many DMEs are experiencing supply chain difficulties and orders for equipment may be back logged several weeks.     Your  Durable Medical Equipment (DME ) company is supposed to provide you with replacement filters, tubing and masks. You can either call your DME when you need new supplies or you can arrange for an automatic shipment schedule.    Your need to be seen by our office at lat minimum of every 12 months in order to renew the prescription for these supplies.   Please make note of who your DME company is and their phone number.   Please make sure that you clean your mask and hosing on a regular basis.  Your DME can provide you with additional information regarding proper care and cleaning of your

## 2024-10-18 NOTE — PROGRESS NOTES
[facility]   Sleep Medicine     Office Progress Note - Initial Evaluation        [address]  [address]  [facility] [address] [phone] Fax    Reason for visit/referral: Evaluation for history of/ ongoing care of obstructive sleep apnea/sleep disordered breathing  Assessment:      1. ALEX on CPAP  Assessment & Plan:      History of moderate obstructive sleep apnea diagnosed in 2001.  Was being followed by Dr. Hester, last seen in 2020 in the office and was doing well at that time.    Continues to do well but needs new machine with the same settings as these appear to be working quite well for him     Orders:  -     DME - DURABLE MEDICAL EQUIPMENT  2. Essential hypertension with goal blood pressure less than 140/90  Assessment & Plan:   Chronic, not at goal (unstable), continue current treatment plan  3. Coronary artery disease involving native coronary artery of native heart with angina pectoris with documented spasm (HCC)  Overview:  5/22 CCS3 angina, increasing frequency  4. Prediabetes    Mr. Olguin has a history of moderate obstructive sleep apnea diagnosed in 2001.  He's had CPAP ever since and simply cannot sleep without it.  If he did not have a CPAP he would wake up \"every 20 minutes\" and would be \"wrecked the next day\".    In reviewing his current CPAP download data today,      Patient reports he is doing well.    No complaints today.    He continues to due very well with PAP therapy and reports on going clinical benefit.    No aerophagia, or bloating    Mask- No skin irritation    He is using a nasal pillows mask    He is able to get his supplies without issue    PAP Hygiene: Cleans at least once weekly with soap and water      In reviewing his CPAP download data over the last 90 days, his compliance is 88% for more than 4 hours usage.  His residual AHI is low at 2.0 and he sleeping more than 8 hours per night.     He also has a travel CPAP with several

## 2024-10-18 NOTE — ASSESSMENT & PLAN NOTE
History of moderate obstructive sleep apnea diagnosed in 2001.  Was being followed by Dr. Hester, last seen in 2020 in the office and was doing well at that time.      Continues to do well but needs new machine with the same settings as these appear to be working quite well for him

## 2024-11-22 DIAGNOSIS — E78.5 HYPERLIPIDEMIA, UNSPECIFIED HYPERLIPIDEMIA TYPE: ICD-10-CM

## 2024-11-22 RX ORDER — ROSUVASTATIN CALCIUM 10 MG/1
10 TABLET, COATED ORAL NIGHTLY
Qty: 90 TABLET | Refills: 1 | Status: SHIPPED | OUTPATIENT
Start: 2024-11-22

## 2024-12-26 DIAGNOSIS — F32.4 MAJOR DEPRESSIVE DISORDER, SINGLE EPISODE, IN PARTIAL REMISSION (HCC): ICD-10-CM

## 2024-12-30 RX ORDER — SERTRALINE HYDROCHLORIDE 100 MG/1
100 TABLET, FILM COATED ORAL DAILY
Qty: 90 TABLET | Refills: 0 | Status: SHIPPED | OUTPATIENT
Start: 2024-12-30

## 2025-01-10 ENCOUNTER — OFFICE VISIT (OUTPATIENT)
Age: 60
End: 2025-01-10
Payer: COMMERCIAL

## 2025-01-10 VITALS
WEIGHT: 216 LBS | HEIGHT: 69 IN | DIASTOLIC BLOOD PRESSURE: 81 MMHG | BODY MASS INDEX: 31.99 KG/M2 | HEART RATE: 83 BPM | OXYGEN SATURATION: 97 % | SYSTOLIC BLOOD PRESSURE: 136 MMHG

## 2025-01-10 DIAGNOSIS — E78.2 MIXED HYPERLIPIDEMIA: ICD-10-CM

## 2025-01-10 DIAGNOSIS — G47.33 OBSTRUCTIVE SLEEP APNEA (ADULT) (PEDIATRIC): ICD-10-CM

## 2025-01-10 DIAGNOSIS — I25.10 ATHEROSCLEROSIS OF NATIVE CORONARY ARTERY OF NATIVE HEART WITHOUT ANGINA PECTORIS: Primary | ICD-10-CM

## 2025-01-10 DIAGNOSIS — R55 VASOVAGAL SYNCOPE: ICD-10-CM

## 2025-01-10 DIAGNOSIS — I10 ESSENTIAL (PRIMARY) HYPERTENSION: ICD-10-CM

## 2025-01-10 PROCEDURE — 3075F SYST BP GE 130 - 139MM HG: CPT | Performed by: NURSE PRACTITIONER

## 2025-01-10 PROCEDURE — 99214 OFFICE O/P EST MOD 30 MIN: CPT | Performed by: NURSE PRACTITIONER

## 2025-01-10 PROCEDURE — 3079F DIAST BP 80-89 MM HG: CPT | Performed by: NURSE PRACTITIONER

## 2025-01-10 RX ORDER — DILTIAZEM HYDROCHLORIDE 240 MG/1
240 CAPSULE, COATED, EXTENDED RELEASE ORAL DAILY
Qty: 90 CAPSULE | Refills: 3 | Status: SHIPPED | OUTPATIENT
Start: 2025-01-10

## 2025-01-10 ASSESSMENT — PATIENT HEALTH QUESTIONNAIRE - PHQ9
DEPRESSION UNABLE TO ASSESS: FUNCTIONAL CAPACITY MOTIVATION LIMITS ACCURACY
SUM OF ALL RESPONSES TO PHQ QUESTIONS 1-9: 0
SUM OF ALL RESPONSES TO PHQ9 QUESTIONS 1 & 2: 0
SUM OF ALL RESPONSES TO PHQ QUESTIONS 1-9: 0
1. LITTLE INTEREST OR PLEASURE IN DOING THINGS: NOT AT ALL
SUM OF ALL RESPONSES TO PHQ QUESTIONS 1-9: 0
SUM OF ALL RESPONSES TO PHQ QUESTIONS 1-9: 0
2. FEELING DOWN, DEPRESSED OR HOPELESS: NOT AT ALL

## 2025-01-10 NOTE — PROGRESS NOTES
1. Have you been to the ER, urgent care clinic since your last visit?  Hospitalized since your last visit?     No    2. Have you seen or consulted any other health care providers outside of the [facility] since your last visit?  Include any pap smears or colon screening.      No     
mid, OM1 luminal irregularities, Ostial LAD 30-40% stenosis, Diagonal 1 10-30% stenosis, mLAD 30-40%, LM luminal irregularities  Mildly elevated left sided pressures 13mmHg LVEDP  RCA Dominant System     Plan: Continue with aspirin 81mg po daily, statin as can tolerate, bb as can tolerate, maximize anti-anginal treatment and GDMT cardiomyopathy    Assessment  Mr. Olguin has a reminder for a \"due or due soon\" health maintenance. I have asked that he contact his primary care provider for follow-up on this health maintenance.         No data to display                  Assessment        Diagnosis Orders   1. Atherosclerosis of native coronary artery of native heart without angina pectoris        2. Vasovagal syncope        3. Essential (primary) hypertension  dilTIAZem (CARDIZEM CD) 240 MG extended release capsule      4. Mixed hyperlipidemia        5. Obstructive sleep apnea (adult) (pediatric)                Medications Discontinued During This Encounter   Medication Reason    dilTIAZem (CARDIZEM CD) 240 MG extended release capsule REORDER         Had tried cardizem in past -had drop in bp  No acei/normal lvef  2/2019  Angina significantly improved on addition of Imdur.  Blood pressure controlled continue medical management  In 2019  Cardiac status stable.  Angina stable.  He has orthostatic dizziness.  Likely from neuropathy.  Will use support stocking.  Consider additional medication if required if symptoms are persistent  2/2020  Recent syncope clinically vasovagal.  History of positive tilt table in 2013.  Will discontinue amlodipine and monitor as blood pressure on low normal side.  Monitor for angina and spasm.  Continue with support stocking.  Pressure maneuvers and if needed midodrine  7/2020  Cardiac status stable.  Okay to proceed with knee surgery with medium cardiac risk.  No recurrence of syncope or angina  1/2021  Recent increase in chest tightness.  Currently takes Imdur 60 mg increased amlodipine to

## 2025-01-24 NOTE — PROGRESS NOTES
Chief Complaint   Patient presents with    Benign Prostatic Hypertrophy    Gastroesophageal Reflux    Hypertension    PRE-DM    Sleep Apnea     CPAP Use       \"Have you been to the ER, urgent care clinic since your last visit?  Hospitalized since your last visit?\"    NO    “Have you seen or consulted any other health care providers outside our system since your last visit?”    NO    No updated immunization noted on [place] Immunization Registry as of 01/24/2025

## 2025-01-24 NOTE — PATIENT INSTRUCTIONS
Patient Education        A Healthy Lifestyle: Care Instructions  A healthy lifestyle can help you feel good, have more energy, and stay at a weight that's healthy for you. You can share a healthy lifestyle with your friends and family. And you can do it on your own.    Eat meals with your friends or family. You could try cooking together.   Plan activities with other people. Go for a walk with a friend, try a free online fitness class, or join a sports league.     Eat a variety of healthy foods. These include fruits, vegetables, whole grains, low-fat dairy, and lean protein.   Choose healthy portions of food. You can use the Nutrition Facts label on food packages as a guide.     Eat more fruits and vegetables. You could add vegetables to sandwiches or add fruit to cereal.   Drink water when you are thirsty. Limit soda, juice, and sports drinks.     Try to exercise most days. Aim for at least 2½ hours of exercise each week.   Keep moving. Work in the garden or take your dog on a walk. Use the stairs instead of the elevator.     If you use tobacco or nicotine, try to quit. Ask your doctor about programs and medicines to help you quit.   Limit alcohol. Men should have no more than 2 drinks a day. Women should have no more than 1. For some people, no alcohol is the best choice.   Follow-up care is a key part of your treatment and safety. Be sure to make and go to all appointments, and call your doctor if you are having problems. It's also a good idea to know your test results and keep a list of the medicines you take.  Where can you learn more?  Go to https://www.[employer].net/patientEd and enter U807 to learn more about \"A Healthy Lifestyle: Care Instructions.\"  Current as of: April 30, 2024  Content Version: 14.3  © 2024 [employer]   Care instructions adapted under license by [employer]. If you have questions about a medical condition or this instruction, always ask your healthcare professional. [employer]

## 2025-01-26 SDOH — ECONOMIC STABILITY: INCOME INSECURITY: IN THE LAST 12 MONTHS, WAS THERE A TIME WHEN YOU WERE NOT ABLE TO PAY THE MORTGAGE OR RENT ON TIME?: NO

## 2025-01-26 SDOH — ECONOMIC STABILITY: TRANSPORTATION INSECURITY
IN THE PAST 12 MONTHS, HAS THE LACK OF TRANSPORTATION KEPT YOU FROM MEDICAL APPOINTMENTS OR FROM GETTING MEDICATIONS?: NO

## 2025-01-26 SDOH — ECONOMIC STABILITY: FOOD INSECURITY: WITHIN THE PAST 12 MONTHS, THE FOOD YOU BOUGHT JUST DIDN'T LAST AND YOU DIDN'T HAVE MONEY TO GET MORE.: NEVER TRUE

## 2025-01-26 SDOH — ECONOMIC STABILITY: FOOD INSECURITY: WITHIN THE PAST 12 MONTHS, YOU WORRIED THAT YOUR FOOD WOULD RUN OUT BEFORE YOU GOT MONEY TO BUY MORE.: NEVER TRUE

## 2025-01-27 ENCOUNTER — OFFICE VISIT (OUTPATIENT)
Facility: CLINIC | Age: 60
End: 2025-01-27
Payer: COMMERCIAL

## 2025-01-27 ENCOUNTER — HOSPITAL ENCOUNTER (OUTPATIENT)
Facility: HOSPITAL | Age: 60
Discharge: HOME | End: 2025-01-30
Payer: COMMERCIAL

## 2025-01-27 VITALS
HEART RATE: 68 BPM | SYSTOLIC BLOOD PRESSURE: 132 MMHG | BODY MASS INDEX: 31.7 KG/M2 | RESPIRATION RATE: 16 BRPM | WEIGHT: 214 LBS | HEIGHT: 69 IN | DIASTOLIC BLOOD PRESSURE: 82 MMHG | TEMPERATURE: 98.5 F | OXYGEN SATURATION: 97 %

## 2025-01-27 DIAGNOSIS — E78.5 HYPERLIPIDEMIA, UNSPECIFIED HYPERLIPIDEMIA TYPE: ICD-10-CM

## 2025-01-27 DIAGNOSIS — F32.4 MAJOR DEPRESSIVE DISORDER, SINGLE EPISODE, IN PARTIAL REMISSION (HCC): ICD-10-CM

## 2025-01-27 DIAGNOSIS — M25.511 CHRONIC RIGHT SHOULDER PAIN: ICD-10-CM

## 2025-01-27 DIAGNOSIS — N40.0 BENIGN PROSTATIC HYPERPLASIA WITHOUT LOWER URINARY TRACT SYMPTOMS: ICD-10-CM

## 2025-01-27 DIAGNOSIS — I10 ESSENTIAL (PRIMARY) HYPERTENSION: Primary | ICD-10-CM

## 2025-01-27 DIAGNOSIS — N20.0 CALCULUS OF KIDNEY: ICD-10-CM

## 2025-01-27 DIAGNOSIS — E66.09 CLASS 1 OBESITY DUE TO EXCESS CALORIES WITH SERIOUS COMORBIDITY IN ADULT, UNSPECIFIED BMI: ICD-10-CM

## 2025-01-27 DIAGNOSIS — E55.9 VITAMIN D DEFICIENCY: ICD-10-CM

## 2025-01-27 DIAGNOSIS — R73.03 PREDIABETES: ICD-10-CM

## 2025-01-27 DIAGNOSIS — Z99.89 DEPENDENCE ON OTHER ENABLING MACHINES AND DEVICES: ICD-10-CM

## 2025-01-27 DIAGNOSIS — R51.9 CHRONIC DAILY HEADACHE: ICD-10-CM

## 2025-01-27 DIAGNOSIS — G89.29 CHRONIC RIGHT SHOULDER PAIN: ICD-10-CM

## 2025-01-27 DIAGNOSIS — K21.9 GASTRO-ESOPHAGEAL REFLUX DISEASE WITHOUT ESOPHAGITIS: ICD-10-CM

## 2025-01-27 DIAGNOSIS — G47.33 OBSTRUCTIVE SLEEP APNEA (ADULT) (PEDIATRIC): ICD-10-CM

## 2025-01-27 DIAGNOSIS — E66.811 CLASS 1 OBESITY DUE TO EXCESS CALORIES WITH SERIOUS COMORBIDITY IN ADULT, UNSPECIFIED BMI: ICD-10-CM

## 2025-01-27 PROCEDURE — 73030 X-RAY EXAM OF SHOULDER: CPT

## 2025-01-27 PROCEDURE — 3075F SYST BP GE 130 - 139MM HG: CPT | Performed by: FAMILY MEDICINE

## 2025-01-27 PROCEDURE — 3079F DIAST BP 80-89 MM HG: CPT | Performed by: FAMILY MEDICINE

## 2025-01-27 PROCEDURE — 99214 OFFICE O/P EST MOD 30 MIN: CPT | Performed by: FAMILY MEDICINE

## 2025-01-27 ASSESSMENT — PATIENT HEALTH QUESTIONNAIRE - PHQ9
1. LITTLE INTEREST OR PLEASURE IN DOING THINGS: NOT AT ALL
SUM OF ALL RESPONSES TO PHQ QUESTIONS 1-9: 0
3. TROUBLE FALLING OR STAYING ASLEEP: NOT AT ALL
SUM OF ALL RESPONSES TO PHQ QUESTIONS 1-9: 0
9. THOUGHTS THAT YOU WOULD BE BETTER OFF DEAD, OR OF HURTING YOURSELF: NOT AT ALL
4. FEELING TIRED OR HAVING LITTLE ENERGY: NOT AT ALL
SUM OF ALL RESPONSES TO PHQ QUESTIONS 1-9: 0
7. TROUBLE CONCENTRATING ON THINGS, SUCH AS READING THE NEWSPAPER OR WATCHING TELEVISION: NOT AT ALL
2. FEELING DOWN, DEPRESSED OR HOPELESS: NOT AT ALL
5. POOR APPETITE OR OVEREATING: NOT AT ALL
8. MOVING OR SPEAKING SO SLOWLY THAT OTHER PEOPLE COULD HAVE NOTICED. OR THE OPPOSITE, BEING SO FIGETY OR RESTLESS THAT YOU HAVE BEEN MOVING AROUND A LOT MORE THAN USUAL: NOT AT ALL
SUM OF ALL RESPONSES TO PHQ QUESTIONS 1-9: 0
6. FEELING BAD ABOUT YOURSELF - OR THAT YOU ARE A FAILURE OR HAVE LET YOURSELF OR YOUR FAMILY DOWN: NOT AT ALL
SUM OF ALL RESPONSES TO PHQ9 QUESTIONS 1 & 2: 0
10. IF YOU CHECKED OFF ANY PROBLEMS, HOW DIFFICULT HAVE THESE PROBLEMS MADE IT FOR YOU TO DO YOUR WORK, TAKE CARE OF THINGS AT HOME, OR GET ALONG WITH OTHER PEOPLE: NOT DIFFICULT AT ALL

## 2025-01-27 NOTE — PROGRESS NOTES
SUBJECTIVE  Chief Complaint   Patient presents with    Benign Prostatic Hypertrophy    Gastroesophageal Reflux    Hypertension    PRE-DM    Sleep Apnea     CPAP Use       He has metabolic disease in the way of hypertension, prediabetes, hyperlipidemia in the setting of obesity.  He reports compliance with his current medications without side effects.   He did have some issues with getting Saxenda at times.  He has been on Saxenda without any adverse side effects.      He has depression that has been well-controlled on Zoloft.  He has no side effects.  No harmful ideations outwardly expressed.     He has GERD controlled on PPIs which he takes as needed.  He has had colorectal cancer screening updated at the end of 2021.      He has sleep apnea controlled with the use of CPAP for ALEX.  He has chronic daily headaches that are not fully controlled on Pamelor.    He has BPH and a family history of prostate CA.  He has had kidney stones as well and sees a urologist. He had his prostate procedure and has better flow.    Has follow-up with cardiology regularly.  Had a normal cath.  Is doing well on Imdur.    Right shoulder pain.  He has had RTC surgery on the left.  He is left handed.  Has pain with lifting certain things at times. Has pain sleeping on right side as well.      OBJECTIVE    Blood pressure 132/82, pulse 68, temperature 98.5 °F (36.9 °C), temperature source Skin, resp. rate 16, height 1.753 m (5' 9\"), weight 97.1 kg (214 lb), SpO2 97%.     General:  Alert, cooperative, well appearing, in no apparent distress.  CV:  The heart sounds are regular in rate and rhythm.  There is a normal S1 and S2.  There or no murmurs  Lungs:  Inspiratory and expiratory efforts are full and unlabored.  Lung sounds are clear and equal to auscultation throughout all lung fields without wheezing, rales, or rhonchi.  Right shoulder:  full ROM.  There is pain with resisted supraspinatus testing without weakness. There is pain with  stool culture 1/31 moderate shigella species isolated

## 2025-02-10 DIAGNOSIS — F32.4 MAJOR DEPRESSIVE DISORDER, SINGLE EPISODE, IN PARTIAL REMISSION (HCC): ICD-10-CM

## 2025-02-10 RX ORDER — SERTRALINE HYDROCHLORIDE 100 MG/1
100 TABLET, FILM COATED ORAL DAILY
Qty: 90 TABLET | Refills: 1 | Status: SHIPPED | OUTPATIENT
Start: 2025-02-10

## 2025-02-25 ENCOUNTER — TELEPHONE (OUTPATIENT)
Facility: CLINIC | Age: 60
End: 2025-02-25

## 2025-02-25 NOTE — TELEPHONE ENCOUNTER
Fax received from Cover my meds stating prior auth is required for the   SAXENDA 18 MG/3ML SOPN .  Submit a PA request  1. Go to [email or web address] and click \"Enter a Key\"  2. Patient last name: Sharif      : 1965      Key: [id number]  3. Click \"start a PA\", complete the form, and \"send to plan\"

## 2025-02-26 NOTE — TELEPHONE ENCOUNTER
Left message via voicemail for Mr. Tony Olguin to clarify if he is paying out of pocket for Saxenda. Prior has been completed on several occasion, Dr. Ramachandran has completed additional questions on 02/19/2025 and insurance has denied.

## 2025-02-27 NOTE — TELEPHONE ENCOUNTER
Received prior authorization denial via fax on medication Saxenda Injection effective 02/12/2025 to 02/18/2025. Mr. Olguin has been made aware.

## 2025-03-30 NOTE — PROGRESS NOTES
HISTORY OF PRESENT ILLNESS  Nany Schaeffer is a 62 y.o. male. Patient with cad,coronary spasm,htn  On follow up patient denies any sob, palpitation or other significant symptoms. Hypertension   The history is provided by the patient. This is a chronic problem. The problem occurs constantly. The problem has not changed since onset. Associated symptoms include chest pain. Cholesterol Problem   The history is provided by the patient. This is a chronic problem. The problem occurs constantly. The problem has not changed since onset. Associated symptoms include chest pain. Chest Pain (Angina)    The history is provided by the patient. This is a recurrent problem. The problem has been gradually worsening. The problem occurs every several days. The pain is associated with exertion and rest. The pain is present in the substernal region. The pain is mild. The quality of the pain is described as pressure-like. The pain does not radiate. Pertinent negatives include no claudication, no cough, no dizziness, no fever, no hemoptysis, no nausea, no orthopnea, no palpitations, no PND, no sputum production, no vomiting and no weakness. He has tried nitroglycerin for the symptoms. Review of Systems   Constitutional: Negative for chills and fever. HENT: Negative for nosebleeds. Eyes: Negative for blurred vision and double vision. Respiratory: Negative for cough, hemoptysis, sputum production and wheezing. Cardiovascular: Positive for chest pain. Negative for palpitations, orthopnea, claudication, leg swelling and PND. Gastrointestinal: Negative for heartburn, nausea and vomiting. Musculoskeletal: Negative for myalgias. Skin: Negative for rash. Neurological: Negative for dizziness and weakness. Endo/Heme/Allergies: Does not bruise/bleed easily.      Family History   Problem Relation Age of Onset    Hypertension Mother     Diabetes Mother     Hypertension Father     Cancer Father         Prostate yes  Heart Disease Father         Heart Attack - 2013    Heart Attack Father     Stroke Brother     Migraines Brother     Cancer Sister         Breast    Migraines Sister     Stroke Brother        Past Medical History:   Diagnosis Date    Back pain     CAD (coronary artery disease)     Chest pain     Coronary artery spasm (HCC)     GERD (gastroesophageal reflux disease)     Headache     migraines    Hypertension     Kidney disease     Kidney stone     Nausea     Sleep apnea     Sleep apnea     uses cpap    Sleep disorder     Vomiting        Past Surgical History:   Procedure Laterality Date    CARDIAC SURG PROCEDURE UNLIST      cardiac catheterization    COLONOSCOPY N/A 9/30/2016    COLONOSCOPY with biopsy performed by Pablo Najjar, MD at [facility] ENDOSCOPY    HX BACK SURGERY      x3    HX COLONOSCOPY      HX ENDOSCOPY      HX HEENT      sinus sx    HX ORTHOPAEDIC      left shoulder, right knee, left thumb, left great toe sx    HX UROLOGICAL      Lithotripsy    NH COLSC FLX W/RMVL OF TUMOR POLYP LESION SNARE TQ N/A 09/30/2016    Dr. Rodrigo Jauergui       Social History     Tobacco Use    Smoking status: Never Smoker    Smokeless tobacco: Never Used   Substance Use Topics    Alcohol use: Yes     Comment: rare use       No Known Allergies        Visit Vitals  /74   Pulse 88   Ht 5' 9\" (1.753 m)   Wt 112.9 kg (249 lb)   BMI 36.77 kg/m²         Physical Exam   Constitutional: He is oriented to person, place, and time. He appears well-developed and well-nourished. HENT:   Head: Normocephalic and atraumatic. Eyes: Conjunctivae are normal.   Neck: Neck supple. No JVD present. No tracheal deviation present. No thyromegaly present. Cardiovascular: Normal rate, regular rhythm and normal heart sounds. Exam reveals no gallop and no friction rub. No murmur heard. Pulmonary/Chest: Breath sounds normal. No respiratory distress. He has no wheezes. He has no rales. He exhibits no tenderness. Abdominal: Soft. There is no tenderness. Musculoskeletal: He exhibits no edema. Neurological: He is alert and oriented to person, place, and time. Skin: Skin is warm and dry. Psychiatric: He has a normal mood and affect. Mr. Monalisa Hazel has a reminder for a \"due or due soon\" health maintenance. I have asked that he contact his primary care provider for follow-up on this health maintenance. Cath-2004  rca spasm-  lcx 30-40%  Stress test-2015  Fixed ant defect  Echo-2015  Normal lvef  ekg-7/2016-  wnl  I Have personally reviewed recent relevant labs available and discussed with patient  5/2019-BMP, LFT, lipids. LDL-49  Assessment         ICD-10-CM ICD-9-CM    1. Coronary artery disease involving native coronary artery of native heart with angina pectoris with documented spasm (HCC) I25.111 414.01      413.9     Stable continue current medical management monitor   2. Essential hypertension with goal blood pressure less than 140/90 I10 401.9     Stable on treatment monitor   3. Hyperlipidemia, unspecified hyperlipidemia type E78.5 272.4     Continue treatment. Follow-up lab with PCP   4. DB on CPAP G47.33 327.23     Z99.89 V46.8     Continue treatment. Continue diet exercise. Had tried cardizem in past -had drop in bp  No acei/normal lvef  2/2019  Angina significantly improved on addition of Imdur. Blood pressure controlled continue medical management  In 2019  Cardiac status stable. Angina stable. He has orthostatic dizziness. Likely from neuropathy. Will use support stocking. Consider additional medication if required if symptoms are persistent    There are no discontinued medications. No orders of the defined types were placed in this encounter. Follow-up and Dispositions    · Return in about 6 months (around 4/7/2020).

## 2025-04-08 DIAGNOSIS — E78.5 HYPERLIPIDEMIA, UNSPECIFIED HYPERLIPIDEMIA TYPE: ICD-10-CM

## 2025-04-09 RX ORDER — ROSUVASTATIN CALCIUM 10 MG/1
10 TABLET, COATED ORAL NIGHTLY
Qty: 90 TABLET | Refills: 1 | Status: SHIPPED | OUTPATIENT
Start: 2025-04-09

## 2025-05-28 DIAGNOSIS — K21.9 GASTRO-ESOPHAGEAL REFLUX DISEASE WITHOUT ESOPHAGITIS: ICD-10-CM

## 2025-05-28 DIAGNOSIS — I10 ESSENTIAL (PRIMARY) HYPERTENSION: ICD-10-CM

## 2025-05-28 DIAGNOSIS — G43.809 OTHER MIGRAINE WITHOUT STATUS MIGRAINOSUS, NOT INTRACTABLE: ICD-10-CM

## 2025-05-28 RX ORDER — NORTRIPTYLINE HYDROCHLORIDE 50 MG/1
CAPSULE ORAL
Qty: 90 CAPSULE | Refills: 1 | Status: SHIPPED | OUTPATIENT
Start: 2025-05-28

## 2025-05-28 RX ORDER — OMEPRAZOLE 40 MG/1
40 CAPSULE, DELAYED RELEASE ORAL DAILY
Qty: 90 CAPSULE | Refills: 1 | Status: SHIPPED | OUTPATIENT
Start: 2025-05-28

## 2025-05-28 RX ORDER — ISOSORBIDE MONONITRATE 60 MG/1
60 TABLET, EXTENDED RELEASE ORAL DAILY
Qty: 90 TABLET | Refills: 3 | Status: SHIPPED | OUTPATIENT
Start: 2025-05-28

## 2025-06-18 ENCOUNTER — TRANSCRIBE ORDERS (OUTPATIENT)
Facility: HOSPITAL | Age: 60
End: 2025-06-18

## 2025-06-18 DIAGNOSIS — M75.51 BURSITIS OF RIGHT SHOULDER: Primary | ICD-10-CM

## 2025-06-27 ENCOUNTER — HOSPITAL ENCOUNTER (OUTPATIENT)
Age: 60
Discharge: HOME | End: 2025-06-30
Payer: COMMERCIAL

## 2025-06-27 DIAGNOSIS — M75.51 BURSITIS OF RIGHT SHOULDER: ICD-10-CM

## 2025-06-27 PROCEDURE — 73221 MRI JOINT UPR EXTREM W/O DYE: CPT

## 2025-07-07 ENCOUNTER — OFFICE VISIT (OUTPATIENT)
Age: 60
End: 2025-07-07
Payer: COMMERCIAL

## 2025-07-07 VITALS
HEART RATE: 83 BPM | HEIGHT: 69 IN | DIASTOLIC BLOOD PRESSURE: 85 MMHG | BODY MASS INDEX: 33.03 KG/M2 | OXYGEN SATURATION: 97 % | WEIGHT: 223 LBS | SYSTOLIC BLOOD PRESSURE: 131 MMHG

## 2025-07-07 DIAGNOSIS — E78.2 MIXED HYPERLIPIDEMIA: ICD-10-CM

## 2025-07-07 DIAGNOSIS — I10 ESSENTIAL (PRIMARY) HYPERTENSION: ICD-10-CM

## 2025-07-07 DIAGNOSIS — R55 VASOVAGAL SYNCOPE: ICD-10-CM

## 2025-07-07 DIAGNOSIS — G47.33 OBSTRUCTIVE SLEEP APNEA (ADULT) (PEDIATRIC): ICD-10-CM

## 2025-07-07 DIAGNOSIS — I25.10 ATHEROSCLEROSIS OF NATIVE CORONARY ARTERY OF NATIVE HEART WITHOUT ANGINA PECTORIS: Primary | ICD-10-CM

## 2025-07-07 PROCEDURE — 3079F DIAST BP 80-89 MM HG: CPT | Performed by: NURSE PRACTITIONER

## 2025-07-07 PROCEDURE — 99214 OFFICE O/P EST MOD 30 MIN: CPT | Performed by: NURSE PRACTITIONER

## 2025-07-07 PROCEDURE — 3075F SYST BP GE 130 - 139MM HG: CPT | Performed by: NURSE PRACTITIONER

## 2025-07-07 ASSESSMENT — PATIENT HEALTH QUESTIONNAIRE - PHQ9
SUM OF ALL RESPONSES TO PHQ QUESTIONS 1-9: 0
2. FEELING DOWN, DEPRESSED OR HOPELESS: NOT AT ALL
1. LITTLE INTEREST OR PLEASURE IN DOING THINGS: NOT AT ALL
SUM OF ALL RESPONSES TO PHQ QUESTIONS 1-9: 0

## 2025-07-07 NOTE — PROGRESS NOTES
HISTORY OF PRESENT ILLNESS  Tony Olguin is a 57 y.o. male.    Patient with cad,coronary spasm,htn  On follow up patient denies any sob, palpitation or other significant symptoms.  2/2020  Seen for follow-up after recent admission.  Episode of syncope started while he was standing in line for longer time started with dizziness subsequent syncope.  Occasional lightheaded when standing in 1 position.  He had positive tilt table in 2013.  No recent syncopal episodes.  1/2021  Patient seen today for follow-up.  Complains of chest tightness on the left side.  Under significant stress at present.  Also feels malaise and fatigue.  4/2022  Patient with h/o coronary spasm, seen today for complaints of chest discomfort with shortness of breath.  This has been ongoing x 2 weeks - worse with physical activity and resolves with rest or nitroglycerin.  He reports ongoing rare palpitations, denies edema.  1/10/2025  Seen in f/u for coronary spasm and hypertension.  He denies chest pain, shortness of breath, palpitations or edema.  7/7/2025  Seen for routine follow-up reports is doing well denies chest pain has mild shortness of breath with exertion which is unchanged from prior denies palpitations or lower extremity edema      Follow-up  Associated symptoms include chest pain and shortness of breath.   Hypertension  The history is provided by the Patient. This is a chronic problem. The problem occurs constantly. The problem has not changed since onset.Associated symptoms include chest pain and shortness of breath.   Cholesterol Problem  The history is provided by the Patient. This is a chronic problem. The problem occurs constantly. The problem has not changed since onset.Associated symptoms include chest pain and shortness of breath.   Chest Pain (Angina)   The history is provided by the Patient. This is a recurrent problem. The problem has been gradually worsening. The problem occurs every several days. The pain is

## 2025-07-08 DIAGNOSIS — E55.9 VITAMIN D DEFICIENCY: ICD-10-CM

## 2025-07-08 RX ORDER — ERGOCALCIFEROL 1.25 MG/1
50000 CAPSULE, LIQUID FILLED ORAL
Qty: 13 CAPSULE | Refills: 0 | Status: SHIPPED | OUTPATIENT
Start: 2025-07-08

## 2025-07-25 ENCOUNTER — HOSPITAL ENCOUNTER (OUTPATIENT)
Age: 60
Discharge: HOME | End: 2025-07-25
Payer: COMMERCIAL

## 2025-07-25 DIAGNOSIS — R73.03 PREDIABETES: ICD-10-CM

## 2025-07-25 DIAGNOSIS — E55.9 VITAMIN D DEFICIENCY: ICD-10-CM

## 2025-07-25 DIAGNOSIS — E78.5 HYPERLIPIDEMIA, UNSPECIFIED HYPERLIPIDEMIA TYPE: ICD-10-CM

## 2025-07-25 DIAGNOSIS — I10 ESSENTIAL (PRIMARY) HYPERTENSION: ICD-10-CM

## 2025-07-25 LAB
25(OH)D3 SERPL-MCNC: 76.2 NG/ML (ref 30–100)
ALBUMIN SERPL-MCNC: 4.2 G/DL (ref 3.4–5)
ALBUMIN/GLOB SERPL: 1.4 (ref 0.8–1.7)
ALP SERPL-CCNC: 103 U/L (ref 45–117)
ALT SERPL-CCNC: 32 U/L (ref 10–50)
ANION GAP SERPL CALC-SCNC: 12 MMOL/L (ref 3–18)
AST SERPL-CCNC: 31 U/L (ref 10–38)
BASOPHILS # BLD: 0.03 K/UL (ref 0–0.1)
BASOPHILS NFR BLD: 0.5 % (ref 0–2)
BILIRUB SERPL-MCNC: 0.3 MG/DL (ref 0.2–1)
BUN SERPL-MCNC: 14 MG/DL (ref 6–23)
BUN/CREAT SERPL: 13 (ref 12–20)
CALCIUM SERPL-MCNC: 9.8 MG/DL (ref 8.5–10.1)
CHLORIDE SERPL-SCNC: 107 MMOL/L (ref 98–107)
CHOLEST SERPL-MCNC: 141 MG/DL
CO2 SERPL-SCNC: 24 MMOL/L (ref 21–32)
CREAT SERPL-MCNC: 1.06 MG/DL (ref 0.6–1.3)
DIFFERENTIAL METHOD BLD: NORMAL
EOSINOPHIL # BLD: 0.17 K/UL (ref 0–0.4)
EOSINOPHIL NFR BLD: 2.8 % (ref 0–5)
ERYTHROCYTE [DISTWIDTH] IN BLOOD BY AUTOMATED COUNT: 13.9 % (ref 11.6–14.5)
EST. AVERAGE GLUCOSE BLD GHB EST-MCNC: 121 MG/DL
GLOBULIN SER CALC-MCNC: 2.9 G/DL (ref 2–4)
GLUCOSE SERPL-MCNC: 103 MG/DL (ref 74–108)
HBA1C MFR BLD: 5.9 % (ref 4.2–5.6)
HCT VFR BLD AUTO: 42 % (ref 36–48)
HDLC SERPL-MCNC: 39 MG/DL (ref 40–60)
HDLC SERPL: 3.6 (ref 0–5)
HGB BLD-MCNC: 13.5 G/DL (ref 13–16)
IMM GRANULOCYTES # BLD AUTO: 0 K/UL (ref 0–0.04)
IMM GRANULOCYTES NFR BLD AUTO: 0 % (ref 0–0.5)
LDLC SERPL CALC-MCNC: 82 MG/DL (ref 0–100)
LYMPHOCYTES # BLD: 2.01 K/UL (ref 0.9–3.6)
LYMPHOCYTES NFR BLD: 33.6 % (ref 21–52)
MCH RBC QN AUTO: 28.3 PG (ref 24–34)
MCHC RBC AUTO-ENTMCNC: 32.1 G/DL (ref 31–37)
MCV RBC AUTO: 88.1 FL (ref 78–100)
MONOCYTES # BLD: 0.56 K/UL (ref 0.05–1.2)
MONOCYTES NFR BLD: 9.4 % (ref 3–10)
NEUTS SEG # BLD: 3.21 K/UL (ref 1.8–8)
NEUTS SEG NFR BLD: 53.7 % (ref 40–73)
NRBC # BLD: 0 K/UL (ref 0–0.01)
NRBC BLD-RTO: 0 PER 100 WBC
PLATELET # BLD AUTO: 222 K/UL (ref 135–420)
PMV BLD AUTO: 10.3 FL (ref 9.2–11.8)
POTASSIUM SERPL-SCNC: 3.8 MMOL/L (ref 3.5–5.5)
PROT SERPL-MCNC: 7.1 G/DL (ref 6.4–8.2)
RBC # BLD AUTO: 4.77 M/UL (ref 4.35–5.65)
SODIUM SERPL-SCNC: 143 MMOL/L (ref 136–145)
TRIGL SERPL-MCNC: 100 MG/DL (ref 0–150)
VLDLC SERPL CALC-MCNC: 20 MG/DL
WBC # BLD AUTO: 6 K/UL (ref 4.6–13.2)

## 2025-07-25 PROCEDURE — 82306 VITAMIN D 25 HYDROXY: CPT

## 2025-07-25 PROCEDURE — 80061 LIPID PANEL: CPT

## 2025-07-25 PROCEDURE — 36415 COLL VENOUS BLD VENIPUNCTURE: CPT

## 2025-07-25 PROCEDURE — 80053 COMPREHEN METABOLIC PANEL: CPT

## 2025-07-25 PROCEDURE — 85025 COMPLETE CBC W/AUTO DIFF WBC: CPT

## 2025-07-25 PROCEDURE — 83036 HEMOGLOBIN GLYCOSYLATED A1C: CPT

## 2025-07-28 ENCOUNTER — OFFICE VISIT (OUTPATIENT)
Facility: CLINIC | Age: 60
End: 2025-07-28
Payer: COMMERCIAL

## 2025-07-28 VITALS
DIASTOLIC BLOOD PRESSURE: 80 MMHG | WEIGHT: 219 LBS | BODY MASS INDEX: 32.44 KG/M2 | SYSTOLIC BLOOD PRESSURE: 126 MMHG | HEIGHT: 69 IN | RESPIRATION RATE: 16 BRPM | OXYGEN SATURATION: 100 % | HEART RATE: 98 BPM | TEMPERATURE: 98.9 F

## 2025-07-28 DIAGNOSIS — N20.0 CALCULUS OF KIDNEY: ICD-10-CM

## 2025-07-28 DIAGNOSIS — R51.9 CHRONIC DAILY HEADACHE: ICD-10-CM

## 2025-07-28 DIAGNOSIS — M19.011 ARTHRITIS OF RIGHT SHOULDER: ICD-10-CM

## 2025-07-28 DIAGNOSIS — R73.03 PREDIABETES: ICD-10-CM

## 2025-07-28 DIAGNOSIS — N40.0 BENIGN PROSTATIC HYPERPLASIA WITHOUT LOWER URINARY TRACT SYMPTOMS: ICD-10-CM

## 2025-07-28 DIAGNOSIS — I10 ESSENTIAL (PRIMARY) HYPERTENSION: Primary | ICD-10-CM

## 2025-07-28 DIAGNOSIS — F32.4 MAJOR DEPRESSIVE DISORDER, SINGLE EPISODE, IN PARTIAL REMISSION: ICD-10-CM

## 2025-07-28 DIAGNOSIS — K21.9 GASTRO-ESOPHAGEAL REFLUX DISEASE WITHOUT ESOPHAGITIS: ICD-10-CM

## 2025-07-28 DIAGNOSIS — E55.9 VITAMIN D DEFICIENCY: ICD-10-CM

## 2025-07-28 DIAGNOSIS — G47.33 OBSTRUCTIVE SLEEP APNEA (ADULT) (PEDIATRIC): ICD-10-CM

## 2025-07-28 DIAGNOSIS — Z99.89 DEPENDENCE ON OTHER ENABLING MACHINES AND DEVICES: ICD-10-CM

## 2025-07-28 PROCEDURE — 3079F DIAST BP 80-89 MM HG: CPT | Performed by: FAMILY MEDICINE

## 2025-07-28 PROCEDURE — 3074F SYST BP LT 130 MM HG: CPT | Performed by: FAMILY MEDICINE

## 2025-07-28 PROCEDURE — 99214 OFFICE O/P EST MOD 30 MIN: CPT | Performed by: FAMILY MEDICINE

## 2025-07-28 RX ORDER — NORTRIPTYLINE HYDROCHLORIDE 75 MG/1
75 CAPSULE ORAL NIGHTLY
Qty: 90 CAPSULE | Refills: 1 | Status: SHIPPED | OUTPATIENT
Start: 2025-07-28

## 2025-07-28 NOTE — PROGRESS NOTES
SUBJECTIVE  Chief Complaint   Patient presents with    Results     Review of results     Hypertension    PRE-DM    Shoulder Pain     C/O right shoulder pain ongoing for months off and on (MRI completed)       He has metabolic disease in the way of hypertension, prediabetes, hyperlipidemia in the setting of obesity.  He reports compliance with his current medications without side effects.  Has stopped Saxenda since beginning of the year.  Has had a mild weight gain but not back to baseline.    He has depression that has been well-controlled on Zoloft.  He has no side effects.  No harmful ideations outwardly expressed.     He has GERD controlled on PPIs which he takes as needed.  He has had colorectal cancer screening updated at the end of 2021.      He has sleep apnea controlled with the use of CPAP for ALEX.  He has chronic daily headaches that are not fully controlled on Pamelor.  Would like to try a higher dose.    He has BPH and a family history of prostate CA.  He has had kidney stones as well and sees a urologist. He had his prostate procedure and has better flow.    Has follow-up with cardiology regularly.  Had a normal cath.  Is doing well on Imdur.    Ongoing right shoulder pain for which he is seeing ortho and the only viable treatment is shoulder replacement.  He has had RTC surgery on the left.  He is left handed.  Has pain with lifting certain things at times.     OBJECTIVE    Blood pressure 126/80, pulse 98, temperature 98.9 °F (37.2 °C), temperature source Skin, resp. rate 16, height 1.753 m (5' 9\"), weight 99.3 kg (219 lb), SpO2 100%.     General:  Alert, cooperative, well appearing, in no apparent distress.  CV:  The heart sounds are regular in rate and rhythm.  There is a normal S1 and S2.  There or no murmurs  Lungs:  Inspiratory and expiratory efforts are full and unlabored.  Lung sounds are clear and equal to auscultation throughout all lung fields without wheezing, rales, or rhonchi.  Psych:

## 2025-07-28 NOTE — PROGRESS NOTES
Chief Complaint   Patient presents with    Results     Review of results     Hypertension    PRE-DM    Shoulder Pain     C/O right shoulder pain ongoing for months off and on (MRI completed)       Have you been to the ER, urgent care clinic since your last visit?  Hospitalized since your last visit?   NO    Have you seen or consulted any other health care providers outside our system since your last visit?   NO

## 2025-08-05 DIAGNOSIS — I10 ESSENTIAL (PRIMARY) HYPERTENSION: ICD-10-CM

## 2025-08-05 RX ORDER — DILTIAZEM HYDROCHLORIDE 240 MG/1
240 CAPSULE, COATED, EXTENDED RELEASE ORAL DAILY
Qty: 90 CAPSULE | Refills: 3 | Status: SHIPPED | OUTPATIENT
Start: 2025-08-05

## 2025-08-20 DIAGNOSIS — F32.4 MAJOR DEPRESSIVE DISORDER, SINGLE EPISODE, IN PARTIAL REMISSION: ICD-10-CM

## 2025-08-23 RX ORDER — SERTRALINE HYDROCHLORIDE 100 MG/1
100 TABLET, FILM COATED ORAL DAILY
Qty: 90 TABLET | Refills: 3 | Status: SHIPPED | OUTPATIENT
Start: 2025-08-23

## (undated) DEVICE — SUTURE MCRYL SZ 3-0 L27IN ABSRB UD L24MM PS-1 3/8 CIR PRIM Y936H

## (undated) DEVICE — SYR 50ML SLIP TIP NSAF LF STRL --

## (undated) DEVICE — PREP SKN CHLRAPRP APL 26ML STR --

## (undated) DEVICE — RADIFOCUS OPTITORQUE ANGIOGRAPHIC CATHETER: Brand: OPTITORQUE

## (undated) DEVICE — SPIROMETER INCENT 2500ML W ONE W VLV

## (undated) DEVICE — 4-PORT MANIFOLD: Brand: NEPTUNE 2

## (undated) DEVICE — PACK PROCEDURE SURG VASC CATH 161 MMC LF

## (undated) DEVICE — 6617 IOBAN II PATIENT ISOLATION DRAPE 5/BX,4BX/CS: Brand: STERI-DRAPE™ IOBAN™ 2

## (undated) DEVICE — BLADE SAW W6.3XL60MM THK0.64MM CUT THK1.04MM REPL SHT RECIP

## (undated) DEVICE — PROCEDURE KIT FLUID MGMT 10 FR CUST MAINFOLD

## (undated) DEVICE — PACK SURG BSHR TOT KNEE LF

## (undated) DEVICE — 3M™ STERI-DRAPE™ U-DRAPE 1015: Brand: STERI-DRAPE™

## (undated) DEVICE — (D)PACK ICE DISP -- DISC BY MFR

## (undated) DEVICE — NEEDLE SPNL 22GA L3.5IN BLK HUB S STL REG WALL FIT STYL W/

## (undated) DEVICE — SYRINGE MED 3ML NDL 22GA L1 1/2IN REG BVL SFGLDE

## (undated) DEVICE — BLANKET WRM AD W50XL85.8IN PACU FULL BODY FORC AIR

## (undated) DEVICE — ENDOSCOPY PUMP TUBING/ CAP SET: Brand: ERBE

## (undated) DEVICE — SUTURE VCRL SZ 2-0 L27IN ABSRB VLT L36MM CT-1 1/2 CIR J339H

## (undated) DEVICE — GUIDEWIRE VASC L260CM DIA0035IN TIP L3MM PTFE J STD TAPR FIX

## (undated) DEVICE — BAG WST COLL CLR DISP PVC W/ ROTICULATING LUER L BOR TBNG

## (undated) DEVICE — CATHETER ANGIO 5FR L100CM GRY S STL NYL JR4 3 SEG BRAID L

## (undated) DEVICE — CANNULA NSL AD TBNG L14FT STD PVC O2 CRV CONN NONFLARED NSL

## (undated) DEVICE — SOLUTION IRRIG 1000ML H2O STRL BLT

## (undated) DEVICE — SUTURE STRATAFIX SPRL SZ 1 L14IN ABSRB VLT L48CM CTX 1/2 SXPD2B405

## (undated) DEVICE — HANDPIECE SET WITH HIGH FLOW TIP AND SUCTION TUBE: Brand: INTERPULSE

## (undated) DEVICE — ZIMMER® STERILE DISPOSABLE TOURNIQUET CUFF WITH PLC, DUAL PORT, SINGLE BLADDER, 34 IN. (86 CM)

## (undated) DEVICE — PRESSURE MONITORING SET: Brand: TRUWAVE

## (undated) DEVICE — SOFT SILICONE HYDROCELLULAR SACRUM DRESSING WITH LOCK AWAY LAYER: Brand: ALLEVYN LIFE SACRUM (LARGE) PACK OF 10

## (undated) DEVICE — SET FLD ADMIN 3 W STPCOCK FIX FEM L BOR 1IN

## (undated) DEVICE — CATHETER THOR 36FR DIA10.7MM POLYVI CHL TRCR TIP STR SFT

## (undated) DEVICE — BOWL AND CEMENT CARTRIDGE WITH BREAKAWAY FEMORAL NOZZLE: Brand: ACM

## (undated) DEVICE — Z DISCONTINUED BY MEDLINE USE 2711682 TRAY SKIN PREP DRY W/ PREM GLV

## (undated) DEVICE — HOOD, PEEL-AWAY: Brand: FLYTE

## (undated) DEVICE — OPTIFOAM GENTLE SA, POSTOP, 4X12: Brand: MEDLINE

## (undated) DEVICE — INTENDED FOR TISSUE SEPARATION, AND OTHER PROCEDURES THAT REQUIRE A SHARP SURGICAL BLADE TO PUNCTURE OR CUT.: Brand: BARD-PARKER ® STAINLESS STEEL BLADES

## (undated) DEVICE — FORCEPS BX L240CM JAW DIA2.8MM L CAP W/ NDL MIC MESH TOOTH

## (undated) DEVICE — MEDI-VAC NON-CONDUCTIVE SUCTION TUBING: Brand: CARDINAL HEALTH

## (undated) DEVICE — INTENDED FOR TISSUE SEPARATION, AND OTHER PROCEDURES THAT REQUIRE A SHARP SURGICAL BLADE TO PUNCTURE OR CUT.: Brand: BARD-PARKER ® CARBON RIB-BACK BLADES

## (undated) DEVICE — 3M™ STERI-DRAPE™ INSTRUMENT POUCH 1018: Brand: STERI-DRAPE™

## (undated) DEVICE — SUTURE VCRL SZ 0 L27IN ABSRB UD L36MM CT-1 1/2 CIR J260H

## (undated) DEVICE — STRYKER PERFORMANCE SERIES SAGITTAL BLADE: Brand: STRYKER PERFORMANCE SERIES

## (undated) DEVICE — BIPOLAR SEALER 23-301-1 AQM MBS: Brand: AQUAMANTYS™

## (undated) DEVICE — MEDI-VAC SUCTION HIGH CAPACITY: Brand: CARDINAL HEALTH

## (undated) DEVICE — GARMENT,MEDLINE,DVT,SEQUENTIAL,CALF,MD: Brand: MEDLINE

## (undated) DEVICE — PAD,ABDOMINAL,8"X10",ST,LF: Brand: MEDLINE

## (undated) DEVICE — BAND COMPR L24CM REG CLR PLAS HEMSTAT EXT HK AND LOOP RETEN

## (undated) DEVICE — DRAPE,ANGIO,BRACH,STERILE,38X44: Brand: MEDLINE

## (undated) DEVICE — SOLUTION IRRIG 3000ML 0.9% SOD CHL FLX CONT 0797208] ICU MEDICAL INC]

## (undated) DEVICE — BAND HEMOSTAT DRY D-STAT RAD --

## (undated) DEVICE — STOCKING COMPR M L16-18IN LNG 19MMHG ANK 8-9IN CALF 12-15IN

## (undated) DEVICE — SOLUTION IV 1000ML 0.9% SOD CHL

## (undated) DEVICE — SNARE POLYP M W27MMXL240CM OVL STIFF DISP CAPTIVATOR

## (undated) DEVICE — CATHETER ANGIO 5FR L100CM GRY S STL NYL JL3.5 3 SEG BRAID L

## (undated) DEVICE — DECANTER: Brand: UNBRANDED

## (undated) DEVICE — CANNULA ORIG TL CLR W FOAM CUSHIONS AND 14FT SUPL TB 3 CHN

## (undated) DEVICE — GLIDESHEATH SLENDER STAINLESS STEEL KIT: Brand: GLIDESHEATH SLENDER

## (undated) DEVICE — SYR 10ML LUER LOK 1/5ML GRAD --

## (undated) DEVICE — FLEX ADVANTAGE 3000CC: Brand: FLEX ADVANTAGE

## (undated) DEVICE — GOWN ISOL IMPERV UNIV, DISP, OPEN BACK, BLUE --

## (undated) DEVICE — GARMENT COMPR L FOR 13-16IN FT INTMIT SGL BLDR HEM FORC II

## (undated) DEVICE — REM POLYHESIVE ADULT PATIENT RETURN ELECTRODE: Brand: VALLEYLAB

## (undated) DEVICE — SYR 20ML LL STRL LF --

## (undated) DEVICE — CATHETER SUCT TR FL TIP 14FR W/ O CTRL

## (undated) DEVICE — ELASTIC BANDAGE 6": Brand: CARDINAL HEALTH

## (undated) DEVICE — COVER US PRB W15XL120CM W/ GEL RUBBERBAND TAPE STRP FLD GEN

## (undated) DEVICE — FLUFF AND POLYMER UNDERPAD,EXTRA HEAVY: Brand: WINGS

## (undated) DEVICE — GAUZE,SPONGE,4"X4",16PLY,STRL,LF,10/TRAY: Brand: MEDLINE

## (undated) DEVICE — KIT CLN UP BON SECOURS MARYV

## (undated) DEVICE — TAPE,CLOTH/SILK,CURAD,3"X10YD,LF,40/CS: Brand: CURAD

## (undated) DEVICE — SYRINGE MED 25GA 3ML L5/8IN SUBQ PLAS W/ DETACH NDL SFTY